# Patient Record
Sex: MALE | Race: WHITE | Employment: OTHER | URBAN - METROPOLITAN AREA
[De-identification: names, ages, dates, MRNs, and addresses within clinical notes are randomized per-mention and may not be internally consistent; named-entity substitution may affect disease eponyms.]

---

## 2019-05-02 ENCOUNTER — HOSPITAL ENCOUNTER (EMERGENCY)
Age: 74
Discharge: HOME | End: 2019-05-02
Attending: EMERGENCY MEDICINE | Admitting: EMERGENCY MEDICINE
Payer: MEDICARE

## 2019-05-02 VITALS
DIASTOLIC BLOOD PRESSURE: 70 MMHG | OXYGEN SATURATION: 98 % | RESPIRATION RATE: 18 BRPM | SYSTOLIC BLOOD PRESSURE: 137 MMHG | TEMPERATURE: 98 F | HEART RATE: 65 BPM

## 2019-05-02 DIAGNOSIS — R11.2 NON-INTRACTABLE VOMITING WITH NAUSEA, UNSPECIFIED VOMITING TYPE: Primary | ICD-10-CM

## 2019-05-02 LAB
ALBUMIN SERPL-MCNC: 3.8 G/DL (ref 3.4–5)
ALBUMIN/GLOB SERPL: 1.2 {RATIO} (ref 0.8–1.7)
ALP SERPL-CCNC: 76 U/L (ref 45–117)
ALT SERPL-CCNC: 27 U/L (ref 16–61)
ANION GAP BLD CALC-SCNC: 17 MMOL/L (ref 10–20)
ANION GAP SERPL CALC-SCNC: 3 MMOL/L (ref 3–18)
APPEARANCE UR: CLEAR
AST SERPL-CCNC: 29 U/L (ref 15–37)
BACTERIA URNS QL MICRO: ABNORMAL /HPF
BASOPHILS # BLD: 0 K/UL (ref 0–0.1)
BASOPHILS NFR BLD: 0 % (ref 0–2)
BILIRUB SERPL-MCNC: 1.1 MG/DL (ref 0.2–1)
BILIRUB UR QL: NEGATIVE
BUN BLD-MCNC: 17 MG/DL (ref 7–18)
BUN SERPL-MCNC: 16 MG/DL (ref 7–18)
BUN/CREAT SERPL: 12 (ref 12–20)
CA-I BLD-MCNC: 1.09 MMOL/L (ref 1.12–1.32)
CALCIUM SERPL-MCNC: 8.9 MG/DL (ref 8.5–10.1)
CHLORIDE BLD-SCNC: 107 MMOL/L (ref 100–108)
CHLORIDE SERPL-SCNC: 110 MMOL/L (ref 100–108)
CO2 BLD-SCNC: 25 MMOL/L (ref 19–24)
CO2 SERPL-SCNC: 28 MMOL/L (ref 21–32)
COLOR UR: YELLOW
CREAT SERPL-MCNC: 1.34 MG/DL (ref 0.6–1.3)
CREAT UR-MCNC: 1.2 MG/DL (ref 0.6–1.3)
DIFFERENTIAL METHOD BLD: ABNORMAL
EOSINOPHIL # BLD: 0 K/UL (ref 0–0.4)
EOSINOPHIL NFR BLD: 0 % (ref 0–5)
EPITH CASTS URNS QL MICRO: ABNORMAL /LPF (ref 0–5)
ERYTHROCYTE [DISTWIDTH] IN BLOOD BY AUTOMATED COUNT: 13.1 % (ref 11.6–14.5)
GLOBULIN SER CALC-MCNC: 3.1 G/DL (ref 2–4)
GLUCOSE BLD STRIP.AUTO-MCNC: 145 MG/DL (ref 74–106)
GLUCOSE SERPL-MCNC: 176 MG/DL (ref 74–99)
GLUCOSE UR STRIP.AUTO-MCNC: NEGATIVE MG/DL
HCT VFR BLD AUTO: 39.4 % (ref 36–48)
HCT VFR BLD CALC: 35 % (ref 36–49)
HGB BLD-MCNC: 11.9 G/DL (ref 12–16)
HGB BLD-MCNC: 13.3 G/DL (ref 13–16)
HGB UR QL STRIP: NEGATIVE
KETONES UR QL STRIP.AUTO: NEGATIVE MG/DL
LEUKOCYTE ESTERASE UR QL STRIP.AUTO: NEGATIVE
LYMPHOCYTES # BLD: 0.6 K/UL (ref 0.9–3.6)
LYMPHOCYTES NFR BLD: 6 % (ref 21–52)
MCH RBC QN AUTO: 29.8 PG (ref 24–34)
MCHC RBC AUTO-ENTMCNC: 33.8 G/DL (ref 31–37)
MCV RBC AUTO: 88.3 FL (ref 74–97)
MONOCYTES # BLD: 0.5 K/UL (ref 0.05–1.2)
MONOCYTES NFR BLD: 5 % (ref 3–10)
MUCOUS THREADS URNS QL MICRO: ABNORMAL /LPF
NEUTS SEG # BLD: 8.9 K/UL (ref 1.8–8)
NEUTS SEG NFR BLD: 89 % (ref 40–73)
NITRITE UR QL STRIP.AUTO: NEGATIVE
PH UR STRIP: 5.5 [PH] (ref 5–8)
PLATELET # BLD AUTO: 114 K/UL (ref 135–420)
PMV BLD AUTO: 10.7 FL (ref 9.2–11.8)
POTASSIUM BLD-SCNC: 4.7 MMOL/L (ref 3.5–5.5)
POTASSIUM SERPL-SCNC: 4.6 MMOL/L (ref 3.5–5.5)
PROT SERPL-MCNC: 6.9 G/DL (ref 6.4–8.2)
PROT UR STRIP-MCNC: ABNORMAL MG/DL
RBC # BLD AUTO: 4.46 M/UL (ref 4.7–5.5)
RBC #/AREA URNS HPF: ABNORMAL /HPF (ref 0–5)
SODIUM BLD-SCNC: 143 MMOL/L (ref 136–145)
SODIUM SERPL-SCNC: 141 MMOL/L (ref 136–145)
SP GR UR REFRACTOMETRY: 1.02 (ref 1–1.03)
UROBILINOGEN UR QL STRIP.AUTO: 1 EU/DL (ref 0.2–1)
WBC # BLD AUTO: 9.9 K/UL (ref 4.6–13.2)
WBC URNS QL MICRO: ABNORMAL /HPF (ref 0–4)

## 2019-05-02 PROCEDURE — 85025 COMPLETE CBC W/AUTO DIFF WBC: CPT

## 2019-05-02 PROCEDURE — 74011250636 HC RX REV CODE- 250/636: Performed by: EMERGENCY MEDICINE

## 2019-05-02 PROCEDURE — 81001 URINALYSIS AUTO W/SCOPE: CPT

## 2019-05-02 PROCEDURE — 96361 HYDRATE IV INFUSION ADD-ON: CPT

## 2019-05-02 PROCEDURE — 96374 THER/PROPH/DIAG INJ IV PUSH: CPT

## 2019-05-02 PROCEDURE — 80047 BASIC METABLC PNL IONIZED CA: CPT

## 2019-05-02 PROCEDURE — 80053 COMPREHEN METABOLIC PANEL: CPT

## 2019-05-02 PROCEDURE — 99284 EMERGENCY DEPT VISIT MOD MDM: CPT

## 2019-05-02 RX ORDER — ONDANSETRON 2 MG/ML
4 INJECTION INTRAMUSCULAR; INTRAVENOUS
Status: COMPLETED | OUTPATIENT
Start: 2019-05-02 | End: 2019-05-02

## 2019-05-02 RX ORDER — ONDANSETRON 4 MG/1
TABLET, ORALLY DISINTEGRATING ORAL
Qty: 10 TAB | Refills: 0 | Status: SHIPPED | OUTPATIENT
Start: 2019-05-02 | End: 2020-05-15

## 2019-05-02 RX ADMIN — SODIUM CHLORIDE 1000 ML: 900 INJECTION, SOLUTION INTRAVENOUS at 15:03

## 2019-05-02 RX ADMIN — ONDANSETRON 4 MG: 2 INJECTION INTRAMUSCULAR; INTRAVENOUS at 15:03

## 2019-05-02 NOTE — DISCHARGE INSTRUCTIONS
Patient Education        Nausea and Vomiting: Care Instructions  Your Care Instructions    When you are nauseated, you may feel weak and sweaty and notice a lot of saliva in your mouth. Nausea often leads to vomiting. Most of the time you do not need to worry about nausea and vomiting, but they can be signs of other illnesses. Two common causes of nausea and vomiting are stomach flu and food poisoning. Nausea and vomiting from viral stomach flu will usually start to improve within 24 hours. Nausea and vomiting from food poisoning may last from 12 to 48 hours. The doctor has checked you carefully, but problems can develop later. If you notice any problems or new symptoms, get medical treatment right away. Follow-up care is a key part of your treatment and safety. Be sure to make and go to all appointments, and call your doctor if you are having problems. It's also a good idea to know your test results and keep a list of the medicines you take. How can you care for yourself at home? · To prevent dehydration, drink plenty of fluids, enough so that your urine is light yellow or clear like water. Choose water and other caffeine-free clear liquids until you feel better. If you have kidney, heart, or liver disease and have to limit fluids, talk with your doctor before you increase the amount of fluids you drink. · Rest in bed until you feel better. · When you are able to eat, try clear soups, mild foods, and liquids until all symptoms are gone for 12 to 48 hours. Other good choices include dry toast, crackers, cooked cereal, and gelatin dessert, such as Jell-O. When should you call for help? Call 911 anytime you think you may need emergency care. For example, call if:    · You passed out (lost consciousness).    Call your doctor now or seek immediate medical care if:    · You have symptoms of dehydration, such as:  ? Dry eyes and a dry mouth. ? Passing only a little dark urine. ?  Feeling thirstier than usual.   · You have new or worsening belly pain.     · You have a new or higher fever.     · You vomit blood or what looks like coffee grounds.    Watch closely for changes in your health, and be sure to contact your doctor if:    · You have ongoing nausea and vomiting.     · Your vomiting is getting worse.     · Your vomiting lasts longer than 2 days.     · You are not getting better as expected. Where can you learn more? Go to http://blaine-boom.info/. Enter 25 [id number] in the search box to learn more about \"Nausea and Vomiting: Care Instructions. \"  Current as of: September 23, 2018  Content Version: 11.9  © 5406-5113 [employer]. Care instructions adapted under license by [employer] (which disclaims liability or warranty for this information). If you have questions about a medical condition or this instruction, always ask your healthcare professional. [address] any warranty or liability for your use of this information.

## 2019-05-02 NOTE — ED PROVIDER NOTES
EMERGENCY DEPARTMENT HISTORY AND PHYSICAL EXAM 
 
3:03 PM 
 
 
Date: 5/2/2019 Patient Name: Carolyne De Dios History of Presenting Illness Chief Complaint Patient presents with  Abdominal Pain History Provided By: patient Additional History (Context): Carolyne De Dios is a 66 y.o. male presents with vomiting which is been going on for an hour since arriving, earlier in today he is been very unsteady on his feet and copious diarrhea. He was not feeling well yesterday but nothing specific. He does not have abdominal pain right now. Vomit is nonbloody nonbilious and diarrhea is nonbloody. Surgical history significant for 7 way bypass. No abdominal surgical history. PCP: Rod Ramirez MD 
 
Chief Complaint:  
Duration:   
Timing: Location:  
Quality:  
Severity:  
Modifying Factors:  
Associated Symptoms:  
 
 
Current Outpatient Medications Medication Sig Dispense Refill  ondansetron (ZOFRAN ODT) 4 mg disintegrating tablet Take 1-2 tablets every 6-8 hours as needed for nausea and vomiting. 10 Tab 0  
 oxyCODONE-acetaminophen (PERCOCET) 5-325 mg per tablet Take 1 to 2 tablet every 6 hours as needed for pain control. If you were instructed to try over the counter ibuprofen or tylenol, only take the percocet for pain not controlled with the over the counter medication. 30 Tab 0  
 oxyCODONE-acetaminophen (PERCOCET) 5-325 mg per tablet Take 1 tablet every 4-6 hours as needed for pain control. If you were instructed to try over the counter ibuprofen or tylenol, only take the percocet for pain not controlled with the over the counter medication. 30 Tab 0  
 metFORMIN (GLUCOPHAGE) 500 mg tablet Take  by mouth two (2) times daily (with meals).  timolol maleate 0.5 % DrpD ophthalmic solution Administer 1 Drop to both eyes daily.  ezetimibe-simvastatin (VYTORIN 10/40) 10-40 mg per tablet Take 1 Tab by mouth nightly.  metoprolol (LOPRESSOR) 100 mg tablet Take 100 mg by mouth two (2) times a day.  ramipril (ALTACE) 5 mg capsule Take 5 mg by mouth daily.  sitaGLIPtin (JANUVIA) 100 mg tablet Take 100 mg by mouth daily.  bimatoprost (LUMIGAN) 0.03 % ophthalmic drops Administer 1 Drop to both eyes every evening.  aspirin 81 mg tablet Take 81 mg by mouth.  FIBER CHOICE PO Take  by mouth.  MULTIVITAMIN W/IRON, MINERALS (MULTIVITAMIN AND MINERALS PO) Take  by mouth. Lebron Guillaume, Indications: Move Free joint care  nitroglycerin (NITROSTAT) 0.4 mg SL tablet 0.4 mg by SubLINGual route every five (5) minutes as needed. Past History Past Medical History: 
Past Medical History:  
Diagnosis Date  CAD (coronary artery disease)  Diabetes ([facility] [address]  Glaucoma  High cholesterol  Hypertension Past Surgical History: 
Past Surgical History:  
Procedure Laterality Date  CARDIAC SURG PROCEDURE UNLIST    
 bypass Family History: 
Family History Problem Relation Age of Onset  Diabetes Other Social History: 
Social History Tobacco Use  Smoking status: Never Smoker  Smokeless tobacco: Never Used Substance Use Topics  Alcohol use: No  
 Drug use: No  
 
 
Allergies: 
No Known Allergies Review of Systems Review of Systems Constitutional: Negative for diaphoresis and fever. HENT: Negative for congestion and sore throat. Eyes: Negative for pain and itching. Respiratory: Negative for cough and shortness of breath. Cardiovascular: Negative for chest pain and palpitations. Gastrointestinal: Positive for diarrhea and vomiting. Negative for abdominal pain. Endocrine: Negative for polydipsia and polyuria. Genitourinary: Negative for dysuria and hematuria. Musculoskeletal: Negative for arthralgias and myalgias. Skin: Negative for rash and wound. Neurological: Positive for tremors and weakness. Negative for seizures and syncope. Hematological: Does not bruise/bleed easily. Psychiatric/Behavioral: Negative for agitation and hallucinations. Physical Exam  
 
 
Patient Vitals for the past 12 hrs: 
 Pulse Resp BP SpO2  
05/02/19 1400 63 15 139/64 96 % Physical Exam  
Constitutional: He appears well-developed and well-nourished. He appears distressed. HENT:  
Head: Normocephalic and atraumatic. Eyes: Conjunctivae are normal. No scleral icterus. Neck: Normal range of motion. Neck supple. No JVD present. Cardiovascular: Normal rate and regular rhythm. Murmur (Blowing systolic murmur) heard. 4 intact extremity pulses Pulmonary/Chest: Effort normal and breath sounds normal.  
Abdominal: Soft. He exhibits no mass. There is no tenderness. Actively vomiting. Musculoskeletal: Normal range of motion. Lymphadenopathy:  
  He has no cervical adenopathy. Neurological: He is alert. Skin: Skin is warm and dry. Nursing note and vitals reviewed. Diagnostic Study Results Labs - Recent Results (from the past 12 hour(s)) CBC WITH AUTOMATED DIFF Collection Time: 05/02/19  2:00 PM  
Result Value Ref Range WBC 9.9 4.6 - 13.2 K/uL  
 RBC 4.46 (L) 4.70 - 5.50 M/uL  
 HGB 13.3 13.0 - 16.0 g/dL HCT 39.4 36.0 - 48.0 % MCV 88.3 74.0 - 97.0 FL  
 MCH 29.8 24.0 - 34.0 PG  
 MCHC 33.8 31.0 - 37.0 g/dL  
 RDW 13.1 11.6 - 14.5 % PLATELET 132 (L) 215 - 420 K/uL MPV 10.7 9.2 - 11.8 FL  
 NEUTROPHILS 89 (H) 40 - 73 % LYMPHOCYTES 6 (L) 21 - 52 % MONOCYTES 5 3 - 10 % EOSINOPHILS 0 0 - 5 % BASOPHILS 0 0 - 2 %  
 ABS. NEUTROPHILS 8.9 (H) 1.8 - 8.0 K/UL  
 ABS. LYMPHOCYTES 0.6 (L) 0.9 - 3.6 K/UL  
 ABS. MONOCYTES 0.5 0.05 - 1.2 K/UL  
 ABS. EOSINOPHILS 0.0 0.0 - 0.4 K/UL  
 ABS. BASOPHILS 0.0 0.0 - 0.1 K/UL  
 DF AUTOMATED METABOLIC PANEL, COMPREHENSIVE  Collection Time: 05/02/19  2:00 PM  
 Result Value Ref Range Sodium PENDING mmol/L Potassium PENDING mmol/L Chloride PENDING mmol/L  
 CO2 28 21 - 32 mmol/L Anion gap PENDING mmol/L Glucose 176 (H) 74 - 99 mg/dL BUN 16 7.0 - 18 MG/DL Creatinine 1.34 (H) 0.6 - 1.3 MG/DL  
 BUN/Creatinine ratio 12 12 - 20 GFR est AA >60 >60 ml/min/1.73m2 GFR est non-AA 52 (L) >60 ml/min/1.73m2 Calcium 8.9 8.5 - 10.1 MG/DL Bilirubin, total 1.1 (H) 0.2 - 1.0 MG/DL  
 ALT (SGPT) 27 16 - 61 U/L  
 AST (SGOT) 29 15 - 37 U/L Alk. phosphatase 76 45 - 117 U/L Protein, total 6.9 6.4 - 8.2 g/dL Albumin 3.8 3.4 - 5.0 g/dL Globulin 3.1 2.0 - 4.0 g/dL A-G Ratio 1.2 0.8 - 1.7 URINALYSIS W/ RFLX MICROSCOPIC Collection Time: 05/02/19  4:00 PM  
Result Value Ref Range Color YELLOW Appearance CLEAR Specific gravity 1.021 1.005 - 1.030    
 pH (UA) 5.5 5.0 - 8.0 Protein TRACE (A) NEG mg/dL Glucose NEGATIVE  NEG mg/dL Ketone NEGATIVE  NEG mg/dL Bilirubin NEGATIVE  NEG Blood NEGATIVE  NEG Urobilinogen 1.0 0.2 - 1.0 EU/dL Nitrites NEGATIVE  NEG Leukocyte Esterase NEGATIVE  NEG    
URINE MICROSCOPIC ONLY Collection Time: 05/02/19  4:00 PM  
Result Value Ref Range WBC 0 to 3 0 - 4 /hpf  
 RBC NONE 0 - 5 /hpf Epithelial cells FEW 0 - 5 /lpf Bacteria FEW (A) NEG /hpf Mucus FEW (A) NEG /lpf POC CHEM8 Collection Time: 05/02/19  5:25 PM  
Result Value Ref Range CO2, POC 25 (H) 19 - 24 MMOL/L Glucose,  (H) 74 - 106 MG/DL  
 BUN, POC 17 7 - 18 MG/DL Creatinine, POC 1.2 0.6 - 1.3 MG/DL  
 GFRAA, POC >60 >60 ml/min/1.73m2 GFRNA, POC 59 (L) >60 ml/min/1.73m2 Sodium,  136 - 145 MMOL/L Potassium, POC 4.7 3.5 - 5.5 MMOL/L Calcium, ionized (POC) 1.09 (L) 1.12 - 1.32 mmol/L Chloride,  100 - 108 MMOL/L Anion gap, POC 17 10 - 20 Hematocrit, POC 35 (L) 36 - 49 % Hemoglobin, POC 11.9 (L) 12 - 16 G/DL Radiologic Studies -  
 No orders to display No results found. Medications ordered:  
Medications  
sodium chloride 0.9 % bolus infusion 1,000 mL (1,000 mL IntraVENous New Bag 5/2/19 1503) ondansetron (ZOFRAN) injection 4 mg (4 mg IntraVENous Given 5/2/19 1503) Medical Decision Making Initial Medical Decision Making and DDx: 
Possibly acute gastroenteritis. He is pain-free even while he is vomiting here in the ER. His belly is nondistended. Doubt bowel obstruction bowel perforation cholecystitis appendicitis diverticulitis. Will evaluate for metabolic abnormality, UTI as cause of weakness and tremors. ED Course: Progress Notes, Reevaluation, and Consults: 
  
5:36 PM patient symptoms abruptly resolved after his long bout of vomiting. He was ambulatory to the bathroom and is had no events since. Due to lab equipment problems were unable to get the CMP back. Patient has no symptoms and no pain, point-of-care Clementine 8 is nonactionable. Discharge with prescription for Zofran, return for worsening or concerning symptoms. I am the first provider for this patient. I reviewed the vital signs, available nursing notes, past medical history, past surgical history, family history and social history. Patient Vitals for the past 12 hrs: 
 Pulse Resp BP SpO2  
05/02/19 1400 63 15 139/64 96 % Vital Signs-Reviewed the patient's vital signs. Pulse Oximetry Analysis, Cardiac Monitor, 12 lead ekg: 
Sinus rhythm at 63, pulse ox 96 room air Interpreted by the EP. Records Reviewed: Nursing notes reviewed (Time of Review: 3:03 PM) Procedures:  
Critical Care Time:  
Aspirin: (was aspirin given for stroke?) Diagnosis Clinical Impression: 1. Non-intractable vomiting with nausea, unspecified vomiting type Disposition: Discharged Follow-up Information Follow up With Specialties Details Why Contact Info Lisa Jordan MD Internal Medicine In 2 days  [address] [facility] [address] 
[phone] Patient's Medications Start Taking ONDANSETRON (ZOFRAN ODT) 4 MG DISINTEGRATING TABLET    Take 1-2 tablets every 6-8 hours as needed for nausea and vomiting. Continue Taking ASPIRIN 81 MG TABLET    Take 81 mg by mouth. BIMATOPROST (LUMIGAN) 0.03 % OPHTHALMIC DROPS    Administer 1 Drop to both eyes every evening. EZETIMIBE-SIMVASTATIN (VYTORIN 10/40) 10-40 MG PER TABLET    Take 1 Tab by mouth nightly. FIBER CHOICE PO    Take  by mouth. METFORMIN (GLUCOPHAGE) 500 MG TABLET    Take  by mouth two (2) times daily (with meals). METOPROLOL (LOPRESSOR) 100 MG TABLET    Take 100 mg by mouth two (2) times a day. MULTIVITAMIN W/IRON, MINERALS (MULTIVITAMIN AND MINERALS PO)    Take  by mouth. NITROGLYCERIN (NITROSTAT) 0.4 MG SL TABLET    0.4 mg by SubLINGual route every five (5) minutes as needed. OTHER,NON-FORMULARY,    Indications: Move Free joint care OXYCODONE-ACETAMINOPHEN (PERCOCET) 5-325 MG PER TABLET    Take 1 to 2 tablet every 6 hours as needed for pain control. If you were instructed to try over the counter ibuprofen or tylenol, only take the percocet for pain not controlled with the over the counter medication. OXYCODONE-ACETAMINOPHEN (PERCOCET) 5-325 MG PER TABLET    Take 1 tablet every 4-6 hours as needed for pain control. If you were instructed to try over the counter ibuprofen or tylenol, only take the percocet for pain not controlled with the over the counter medication. RAMIPRIL (ALTACE) 5 MG CAPSULE    Take 5 mg by mouth daily. SITAGLIPTIN (JANUVIA) 100 MG TABLET    Take 100 mg by mouth daily. TIMOLOL MALEATE 0.5 % DRPD OPHTHALMIC SOLUTION    Administer 1 Drop to both eyes daily. These Medications have changed No medications on file Stop Taking No medications on file  
 
_______________________________ Notes:   
Garima Loera MD using Dragon dictation     
_______________________________

## 2020-05-15 ENCOUNTER — HOSPITAL ENCOUNTER (EMERGENCY)
Age: 75
Discharge: HOME | End: 2020-05-15
Attending: EMERGENCY MEDICINE
Payer: MEDICARE

## 2020-05-15 VITALS
SYSTOLIC BLOOD PRESSURE: 172 MMHG | TEMPERATURE: 98.1 F | RESPIRATION RATE: 16 BRPM | OXYGEN SATURATION: 99 % | HEART RATE: 54 BPM | DIASTOLIC BLOOD PRESSURE: 79 MMHG

## 2020-05-15 DIAGNOSIS — R04.0 NOSEBLEED: Primary | ICD-10-CM

## 2020-05-15 PROCEDURE — 99283 EMERGENCY DEPT VISIT LOW MDM: CPT

## 2020-05-15 RX ORDER — NITROGLYCERIN 0.4 MG/1
0.4 TABLET SUBLINGUAL
COMMUNITY
End: 2020-05-15

## 2020-05-15 RX ORDER — EZETIMIBE AND SIMVASTATIN 10; 40 MG/1; MG/1
1 TABLET ORAL DAILY
COMMUNITY
End: 2020-05-15

## 2020-05-15 RX ORDER — CLOPIDOGREL BISULFATE 75 MG/1
75 TABLET ORAL DAILY
COMMUNITY
Start: 2020-05-08

## 2020-05-15 RX ORDER — ASPIRIN 81 MG/1
81 TABLET ORAL DAILY
COMMUNITY

## 2020-05-15 RX ORDER — METOPROLOL SUCCINATE 100 MG/1
TABLET, EXTENDED RELEASE ORAL
COMMUNITY
Start: 2020-04-08 | End: 2020-05-15

## 2020-05-15 RX ORDER — GLIMEPIRIDE 4 MG/1
4 TABLET ORAL DAILY
COMMUNITY
End: 2020-10-13

## 2020-05-15 RX ORDER — METFORMIN HYDROCHLORIDE 500 MG/1
500 TABLET ORAL 2 TIMES DAILY WITH MEALS
COMMUNITY
End: 2020-10-13

## 2020-05-15 RX ORDER — GLIMEPIRIDE 4 MG/1
TABLET ORAL
COMMUNITY
Start: 2020-04-08 | End: 2020-05-15

## 2020-05-15 RX ORDER — METOPROLOL SUCCINATE 50 MG/1
100 TABLET, EXTENDED RELEASE ORAL DAILY
COMMUNITY
End: 2020-10-13

## 2020-05-15 RX ORDER — BLOOD SUGAR DIAGNOSTIC
STRIP MISCELLANEOUS
COMMUNITY
Start: 2020-04-08

## 2020-05-15 RX ORDER — LANCETS
EACH MISCELLANEOUS
COMMUNITY
Start: 2019-06-18

## 2020-05-15 RX ORDER — DONEPEZIL HYDROCHLORIDE 10 MG/1
10 TABLET, FILM COATED ORAL
COMMUNITY
Start: 2020-04-08

## 2020-05-15 NOTE — DISCHARGE INSTRUCTIONS
Patient Education        Nosebleeds: Care Instructions  Your Care Instructions    Nosebleeds are common, especially if you have colds or allergies. Many things can cause a nosebleed. Some nosebleeds stop on their own with pressure. Others need packing. Some get cauterized (sealed). If you have gauze or other packing materials in your nose, you will need to follow up with your doctor to have the packing removed. You may need more treatment if you get nosebleeds a lot. The doctor has checked you carefully, but problems can develop later. If you notice any problems or new symptoms, get medical treatment right away. Follow-up care is a key part of your treatment and safety. Be sure to make and go to all appointments, and call your doctor if you are having problems. It's also a good idea to know your test results and keep a list of the medicines you take. How can you care for yourself at home? · If you get another nosebleed:  ? Sit up and tilt your head slightly forward. This keeps blood from going down your throat. ? Use your thumb and index finger to pinch your nose shut for 10 minutes. Use a clock. Do not check to see if the bleeding has stopped before the 10 minutes are up. If the bleeding has not stopped, pinch your nose shut for another 10 minutes. ? When the bleeding has stopped, try not to pick, rub, or blow your nose for 12 hours. Avoiding these things helps keep your nose from bleeding again. · If your doctor prescribed antibiotics, take them as directed. Do not stop taking them just because you feel better. You need to take the full course of antibiotics. To prevent nosebleeds  · Do not blow your nose too hard. · Try not to lift or strain after a nosebleed. · Raise your head on a pillow while you sleep. · Put a thin layer of a saline- or water-based nasal gel, such as NasoGel, inside your nose. Put it on the septum, which divides your nostrils.  This will prevent dryness that can cause nosebleeds. · Use a vaporizer or humidifier to add moisture to your bedroom. Follow the directions for cleaning the machine. · Do not use aspirin, ibuprofen (Advil, Motrin), or naproxen (Aleve) for 36 to 48 hours after a nosebleed unless your doctor tells you to. You can use acetaminophen (Tylenol) for pain relief. · Talk to your doctor about stopping any other medicines you are taking. Some medicines may make you more likely to get a nosebleed. · Do not use cold medicines or nasal sprays without first talking to your doctor. They can make your nose dry. When should you call for help? Call 911 anytime you think you may need emergency care. For example, call if:    · You passed out (lost consciousness).    Call your doctor now or seek immediate medical care if:    · You get another nosebleed and your nose is still bleeding after you have applied pressure 3 times for 10 minutes each time (30 minutes total).     · There is a lot of blood running down the back of your throat even after you pinch your nose and tilt your head forward.     · You have a fever.     · You have sinus pain.    Watch closely for changes in your health, and be sure to contact your doctor if:    · You get nosebleeds often, even if they stop.     · You do not get better as expected. Where can you learn more? Go to http://blaine-boom.info/  Enter S156 in the search box to learn more about \"Nosebleeds: Care Instructions. \"  Current as of: June 26, 2019Content Version: 12.4  © 1516-7755 [employer]. Care instructions adapted under license by [employer] (which disclaims liability or warranty for this information). If you have questions about a medical condition or this instruction, always ask your healthcare professional. [address] any warranty or liability for your use of this information.

## 2020-05-15 NOTE — ED TRIAGE NOTES
Patient c/o nose bleed \"trickle\" x 2 yesterday around 8:30pm that spontaneously stopped on its own. He states he coughed a few times and it was mixed with spit and a little bit of blood. He could not recall if he coughed before the nose bleed, or afterwards. He could not recall his list of medications, but only that he took a lot of them. He states he is having heart valve surgery next week but neither he nor his spouse could recall the name of his Cardiologist. No active bleeding noted at this time.  Patient noted to be on Aspirin and Plavix, per his medication history list.

## 2020-05-15 NOTE — ED NOTES
Both written and verbal discharge instructions given to pt with verbalized understanding of home care and follow up. No prescription given.

## 2020-05-15 NOTE — ED PROVIDER NOTES
65 yo CM with PMHx cad, htn on plavix presents with nosebleed last night, now resolved. Pt states he had a nosebleed around 2100 last night and noticed that since then, he has been coughing up some bloody sputum, though the nosebleeding has resolved. Pt denies fevers, no chest pain, no shortness of breath or other symptoms.            Past Medical History:   Diagnosis Date    CAD (coronary artery disease)     Aortic Stenosis    Diabetes ([address]     Glaucoma     High cholesterol     Hypertension     Ill-defined condition     Bilateral Carotid Artery Stenosis       Past Surgical History:   Procedure Laterality Date    CARDIAC SURG PROCEDURE UNLIST      bypass         Family History:   Problem Relation Age of Onset    Diabetes Other        Social History     Socioeconomic History    Marital status:      Spouse name: Not on file    Number of children: Not on file    Years of education: Not on file    Highest education level: Not on file   Occupational History    Not on file   Social Needs    Financial resource strain: Not on file    Food insecurity     Worry: Not on file     Inability: Not on file    Transportation needs     Medical: Not on file     Non-medical: Not on file   Tobacco Use    Smoking status: Never Smoker    Smokeless tobacco: Never Used   Substance and Sexual Activity    Alcohol use: No    Drug use: No    Sexual activity: Not on file   Lifestyle    Physical activity     Days per week: Not on file     Minutes per session: Not on file    Stress: Not on file   Relationships    Social connections     Talks on phone: Not on file     Gets together: Not on file     Attends Islam service: Not on file     Active member of club or organization: Not on file     Attends meetings of clubs or organizations: Not on file     Relationship status: Not on file    Intimate partner violence     Fear of current or ex partner: Not on file     Emotionally abused: Not on file     Physically abused: Not on file     Forced sexual activity: Not on file   Other Topics Concern    Not on file   Social History Narrative    Not on file         ALLERGIES: Patient has no known allergies. Review of Systems   Constitutional: Negative for fever. HENT: Positive for nosebleeds. Negative for trouble swallowing. Respiratory: Positive for cough. Negative for shortness of breath. Cardiovascular: Negative for chest pain. Gastrointestinal: Negative for abdominal pain and vomiting. Genitourinary: Negative for difficulty urinating. Musculoskeletal: Negative for back pain. Skin: Negative for wound. Neurological: Negative for syncope. Psychiatric/Behavioral: Negative for behavioral problems. All other systems reviewed and are negative. Vitals:    05/15/20 0610   BP: 172/79   Pulse: (!) 54   Resp: 16   Temp: 98.1 °F (36.7 °C)   SpO2: 99%            Physical Exam  Vitals signs and nursing note reviewed. Constitutional:       General: He is not in acute distress. Appearance: He is well-developed. Comments: well-appearing, nad   HENT:      Head: Normocephalic and atraumatic. Nose: Nose normal.      Comments: No bleeding currently     Mouth/Throat:      Pharynx: Oropharynx is clear. Neck:      Musculoskeletal: Normal range of motion. Cardiovascular:      Rate and Rhythm: Normal rate. Pulmonary:      Effort: Pulmonary effort is normal. No respiratory distress. Breath sounds: Normal breath sounds. Abdominal:      Palpations: Abdomen is soft. Tenderness: There is no abdominal tenderness. Musculoskeletal: Normal range of motion. Comments: Mechanically stable   Skin:     General: Skin is warm. Neurological:      General: No focal deficit present. Mental Status: He is alert.       Comments: No focal deficits noted   Psychiatric:         Behavior: Behavior normal.          MDM  Number of Diagnoses or Management Options  Diagnosis management comments: 65 yo CM with PMHx cad on plavix presents with nosebleed and coughing up blood. No fevers, no cp/sob. Examination unremarkable with no bleeding currently and OP clear, and pt ctab with no increased wob. Will monitor in ED and re-evaluate. Amount and/or Complexity of Data Reviewed  Obtain history from someone other than the patient: yes  Review and summarize past medical records: yes    Patient Progress  Patient progress: stable         Procedures    PROGRESS NOTES    6:18 AM:   Alexandrea Acosta MD arrives to the bedside to evaluate the patient. Answered the patient's questions regarding the treatment plan. CONSULTATIONS  None      MEDICATIONS ORDERED  Medications - No data to display    RADIOLOGY INTERPRETATIONS  No orders to display       EKG READINGS/LABORATORY RESULTS  No results found for this or any previous visit (from the past 12 hour(s)). ED DIAGNOSIS & DISPOSITION INFORMATION  Diagnosis:   1. Nosebleed        Disposition: Discharged    Follow-up Information     Follow up With Specialties Details Why Contact Info    Rosana Soto MD Internal Medicine Schedule an appointment as soon as possible for a visit  [address] [place] [address]      [id number] [place] [address] EMERGENCY DEPT Emergency Medicine  As needed [address] [place] [address]  [phone]          Patient's Medications   Start Taking    No medications on file   Continue Taking    ACCU-CHEK ROBERT PLUS TEST STRP STRIP        ASPIRIN DELAYED-RELEASE 81 MG TABLET    Take 81 mg by mouth daily. BIMATOPROST (LUMIGAN) 0.03 % OPHTHALMIC DROPS    Administer 1 Drop to both eyes every evening. CLOPIDOGREL (PLAVIX) 75 MG TAB    Take 75 mg by mouth daily. DONEPEZIL (ARICEPT) 10 MG TABLET        EZETIMIBE-SIMVASTATIN (VYTORIN 10/40) 10-40 MG PER TABLET    Take 1 Tab by mouth nightly. FIBER CHOICE PO    Take  by mouth. GLIMEPIRIDE (AMARYL) 4 MG TABLET    Take 4 mg by mouth daily.     GLUCOSE BLOOD VI TEST STRIPS (ACCU-CHEK ROBERT PLUS TEST STRP) STRIP        LANCETS (ACCU-CHEK SOFTCLIX LANCETS) MISC        METFORMIN (GLUCOPHAGE) 500 MG TABLET    Take 500 mg by mouth two (2) times daily (with meals). METOPROLOL (LOPRESSOR) 100 MG TABLET    Take 100 mg by mouth two (2) times a day. METOPROLOL SUCCINATE (TOPROL-XL) 50 MG XL TABLET    Take 100 mg by mouth daily. MULTIVITAMIN W/IRON, MINERALS (MULTIVITAMIN AND MINERALS PO)    Take  by mouth. NITROGLYCERIN (NITROSTAT) 0.4 MG SL TABLET    0.4 mg by SubLINGual route every five (5) minutes as needed. OTHER,NON-FORMULARY,    Indications: Move Free joint care    RAMIPRIL (ALTACE) 5 MG CAPSULE    Take 5 mg by mouth daily. SITAGLIPTIN (JANUVIA) 100 MG TABLET    Take 100 mg by mouth daily. TIMOLOL MALEATE 0.5 % DRPD OPHTHALMIC SOLUTION    Administer 1 Drop to both eyes daily. These Medications have changed    No medications on file   Stop Taking    ASPIRIN 81 MG TABLET    Take 81 mg by mouth. EZETIMIBE-SIMVASTATIN (VYTORIN) 10-40 MG PER TABLET    Take 1 Tab by mouth daily. GLIMEPIRIDE (AMARYL) 4 MG TABLET        METFORMIN (GLUCOPHAGE) 500 MG TABLET    Take  by mouth two (2) times daily (with meals). METOPROLOL SUCCINATE (TOPROL-XL) 100 MG TABLET        NITROGLYCERIN (NITROSTAT) 0.4 MG SL TABLET    0.4 mg by SubLINGual route. ONDANSETRON (ZOFRAN ODT) 4 MG DISINTEGRATING TABLET    Take 1-2 tablets every 6-8 hours as needed for nausea and vomiting. OXYCODONE-ACETAMINOPHEN (PERCOCET) 5-325 MG PER TABLET    Take 1 to 2 tablet every 6 hours as needed for pain control. If you were instructed to try over the counter ibuprofen or tylenol, only take the percocet for pain not controlled with the over the counter medication. OXYCODONE-ACETAMINOPHEN (PERCOCET) 5-325 MG PER TABLET    Take 1 tablet every 4-6 hours as needed for pain control.   If you were instructed to try over the counter ibuprofen or tylenol, only take the percocet for pain not controlled with the over the counter medication.            Ramila Ragland MD.

## 2020-10-11 ENCOUNTER — HOSPITAL ENCOUNTER (INPATIENT)
Age: 75
LOS: 1 days | Discharge: HOME HEALTH | DRG: 312 | End: 2020-10-13
Attending: EMERGENCY MEDICINE | Admitting: HOSPITALIST
Payer: MEDICARE

## 2020-10-11 ENCOUNTER — APPOINTMENT (OUTPATIENT)
Dept: CT IMAGING | Age: 75
DRG: 312 | End: 2020-10-11
Attending: NURSE PRACTITIONER
Payer: MEDICARE

## 2020-10-11 ENCOUNTER — APPOINTMENT (OUTPATIENT)
Dept: GENERAL RADIOLOGY | Age: 75
DRG: 312 | End: 2020-10-11
Attending: EMERGENCY MEDICINE
Payer: MEDICARE

## 2020-10-11 DIAGNOSIS — I65.23 BILATERAL CAROTID ARTERY STENOSIS: ICD-10-CM

## 2020-10-11 DIAGNOSIS — I25.10 CORONARY ARTERY DISEASE INVOLVING NATIVE HEART WITHOUT ANGINA PECTORIS, UNSPECIFIED VESSEL OR LESION TYPE: ICD-10-CM

## 2020-10-11 DIAGNOSIS — R55 SYNCOPE, UNSPECIFIED SYNCOPE TYPE: Primary | ICD-10-CM

## 2020-10-11 DIAGNOSIS — I95.1 ORTHOSTATIC HYPOTENSION: ICD-10-CM

## 2020-10-11 LAB
25(OH)D3 SERPL-MCNC: 13.4 NG/ML (ref 30–100)
ANION GAP SERPL CALC-SCNC: 8 MMOL/L (ref 3–18)
ATRIAL RATE: 63 BPM
BASOPHILS # BLD: 0 K/UL (ref 0–0.1)
BASOPHILS NFR BLD: 0 % (ref 0–2)
BNP SERPL-MCNC: 586 PG/ML (ref 0–1800)
BUN SERPL-MCNC: 18 MG/DL (ref 7–18)
BUN/CREAT SERPL: 11 (ref 12–20)
CALCIUM SERPL-MCNC: 8.8 MG/DL (ref 8.5–10.1)
CALCULATED P AXIS, ECG09: 40 DEGREES
CALCULATED R AXIS, ECG10: -27 DEGREES
CALCULATED T AXIS, ECG11: 21 DEGREES
CHLORIDE SERPL-SCNC: 105 MMOL/L (ref 100–111)
CK MB CFR SERPL CALC: 2.1 % (ref 0–4)
CK MB SERPL-MCNC: 1.8 NG/ML (ref 5–25)
CK SERPL-CCNC: 86 U/L (ref 39–308)
CO2 SERPL-SCNC: 26 MMOL/L (ref 21–32)
CREAT SERPL-MCNC: 1.7 MG/DL (ref 0.6–1.3)
DIAGNOSIS, 93000: NORMAL
DIFFERENTIAL METHOD BLD: ABNORMAL
EOSINOPHIL # BLD: 0 K/UL (ref 0–0.4)
EOSINOPHIL NFR BLD: 0 % (ref 0–5)
ERYTHROCYTE [DISTWIDTH] IN BLOOD BY AUTOMATED COUNT: 13.2 % (ref 11.6–14.5)
FOLATE SERPL-MCNC: 13.8 NG/ML (ref 3.1–17.5)
GLUCOSE BLD STRIP.AUTO-MCNC: 102 MG/DL (ref 70–110)
GLUCOSE BLD STRIP.AUTO-MCNC: 47 MG/DL (ref 70–110)
GLUCOSE BLD STRIP.AUTO-MCNC: 87 MG/DL (ref 70–110)
GLUCOSE SERPL-MCNC: 72 MG/DL (ref 74–99)
HCT VFR BLD AUTO: 37.3 % (ref 36–48)
HGB BLD-MCNC: 12.7 G/DL (ref 13–16)
LYMPHOCYTES # BLD: 0.5 K/UL (ref 0.9–3.6)
LYMPHOCYTES NFR BLD: 11 % (ref 21–52)
MCH RBC QN AUTO: 29.2 PG (ref 24–34)
MCHC RBC AUTO-ENTMCNC: 34 G/DL (ref 31–37)
MCV RBC AUTO: 85.7 FL (ref 74–97)
MONOCYTES # BLD: 0.5 K/UL (ref 0.05–1.2)
MONOCYTES NFR BLD: 10 % (ref 3–10)
NEUTS SEG # BLD: 3.7 K/UL (ref 1.8–8)
NEUTS SEG NFR BLD: 79 % (ref 40–73)
P-R INTERVAL, ECG05: 248 MS
PLATELET # BLD AUTO: 120 K/UL (ref 135–420)
PMV BLD AUTO: 10.8 FL (ref 9.2–11.8)
POTASSIUM SERPL-SCNC: 3.4 MMOL/L (ref 3.5–5.5)
Q-T INTERVAL, ECG07: 444 MS
QRS DURATION, ECG06: 88 MS
QTC CALCULATION (BEZET), ECG08: 454 MS
RBC # BLD AUTO: 4.35 M/UL (ref 4.7–5.5)
SODIUM SERPL-SCNC: 139 MMOL/L (ref 136–145)
TROPONIN I SERPL-MCNC: <0.02 NG/ML (ref 0–0.04)
VENTRICULAR RATE, ECG03: 63 BPM
VIT B12 SERPL-MCNC: 169 PG/ML (ref 211–911)
WBC # BLD AUTO: 4.8 K/UL (ref 4.6–13.2)

## 2020-10-11 PROCEDURE — 93005 ELECTROCARDIOGRAM TRACING: CPT

## 2020-10-11 PROCEDURE — 74011000258 HC RX REV CODE- 258: Performed by: HOSPITALIST

## 2020-10-11 PROCEDURE — 85025 COMPLETE CBC W/AUTO DIFF WBC: CPT

## 2020-10-11 PROCEDURE — 87086 URINE CULTURE/COLONY COUNT: CPT

## 2020-10-11 PROCEDURE — 81001 URINALYSIS AUTO W/SCOPE: CPT

## 2020-10-11 PROCEDURE — 71045 X-RAY EXAM CHEST 1 VIEW: CPT

## 2020-10-11 PROCEDURE — 74011250637 HC RX REV CODE- 250/637: Performed by: NURSE PRACTITIONER

## 2020-10-11 PROCEDURE — 82607 VITAMIN B-12: CPT

## 2020-10-11 PROCEDURE — 94762 N-INVAS EAR/PLS OXIMTRY CONT: CPT

## 2020-10-11 PROCEDURE — 99218 HC RM OBSERVATION: CPT

## 2020-10-11 PROCEDURE — 2709999900 HC NON-CHARGEABLE SUPPLY

## 2020-10-11 PROCEDURE — 99284 EMERGENCY DEPT VISIT MOD MDM: CPT

## 2020-10-11 PROCEDURE — 74011250636 HC RX REV CODE- 250/636: Performed by: HOSPITALIST

## 2020-10-11 PROCEDURE — 82306 VITAMIN D 25 HYDROXY: CPT

## 2020-10-11 PROCEDURE — 96372 THER/PROPH/DIAG INJ SC/IM: CPT

## 2020-10-11 PROCEDURE — 80048 BASIC METABOLIC PNL TOTAL CA: CPT

## 2020-10-11 PROCEDURE — 82550 ASSAY OF CK (CPK): CPT

## 2020-10-11 PROCEDURE — 83880 ASSAY OF NATRIURETIC PEPTIDE: CPT

## 2020-10-11 PROCEDURE — 82962 GLUCOSE BLOOD TEST: CPT

## 2020-10-11 PROCEDURE — 70450 CT HEAD/BRAIN W/O DYE: CPT

## 2020-10-11 RX ORDER — CLOPIDOGREL BISULFATE 75 MG/1
75 TABLET ORAL DAILY
Status: DISCONTINUED | OUTPATIENT
Start: 2020-10-12 | End: 2020-10-13 | Stop reason: HOSPADM

## 2020-10-11 RX ORDER — ATORVASTATIN CALCIUM 20 MG/1
20 TABLET, FILM COATED ORAL
Status: DISCONTINUED | OUTPATIENT
Start: 2020-10-11 | End: 2020-10-13 | Stop reason: HOSPADM

## 2020-10-11 RX ORDER — DEXTROSE MONOHYDRATE AND SODIUM CHLORIDE 5; .9 G/100ML; G/100ML
75 INJECTION, SOLUTION INTRAVENOUS CONTINUOUS
Status: DISCONTINUED | OUTPATIENT
Start: 2020-10-11 | End: 2020-10-13 | Stop reason: HOSPADM

## 2020-10-11 RX ORDER — ACETAMINOPHEN 325 MG/1
650 TABLET ORAL
Status: DISCONTINUED | OUTPATIENT
Start: 2020-10-11 | End: 2020-10-13 | Stop reason: HOSPADM

## 2020-10-11 RX ORDER — MAGNESIUM SULFATE 100 %
4 CRYSTALS MISCELLANEOUS AS NEEDED
Status: DISCONTINUED | OUTPATIENT
Start: 2020-10-11 | End: 2020-10-13 | Stop reason: HOSPADM

## 2020-10-11 RX ORDER — CYANOCOBALAMIN 1000 UG/ML
100 INJECTION, SOLUTION INTRAMUSCULAR; SUBCUTANEOUS DAILY
Status: DISCONTINUED | OUTPATIENT
Start: 2020-10-11 | End: 2020-10-13 | Stop reason: HOSPADM

## 2020-10-11 RX ORDER — CYANOCOBALAMIN 1000 UG/ML
100 INJECTION, SOLUTION INTRAMUSCULAR; SUBCUTANEOUS DAILY
Status: DISCONTINUED | OUTPATIENT
Start: 2020-10-12 | End: 2020-10-11

## 2020-10-11 RX ORDER — LANOLIN ALCOHOL/MO/W.PET/CERES
1000 CREAM (GRAM) TOPICAL DAILY
Status: DISCONTINUED | OUTPATIENT
Start: 2020-10-14 | End: 2020-10-13 | Stop reason: HOSPADM

## 2020-10-11 RX ORDER — ASPIRIN 81 MG/1
81 TABLET ORAL DAILY
Status: DISCONTINUED | OUTPATIENT
Start: 2020-10-12 | End: 2020-10-13 | Stop reason: HOSPADM

## 2020-10-11 RX ORDER — INSULIN LISPRO 100 [IU]/ML
INJECTION, SOLUTION INTRAVENOUS; SUBCUTANEOUS
Status: DISCONTINUED | OUTPATIENT
Start: 2020-10-11 | End: 2020-10-13 | Stop reason: HOSPADM

## 2020-10-11 RX ORDER — DEXTROSE 50 % IN WATER (D50W) INTRAVENOUS SYRINGE
25-50 AS NEEDED
Status: DISCONTINUED | OUTPATIENT
Start: 2020-10-11 | End: 2020-10-13 | Stop reason: HOSPADM

## 2020-10-11 RX ORDER — POTASSIUM CHLORIDE 20 MEQ/1
40 TABLET, EXTENDED RELEASE ORAL ONCE
Status: COMPLETED | OUTPATIENT
Start: 2020-10-11 | End: 2020-10-11

## 2020-10-11 RX ORDER — METOPROLOL TARTRATE 25 MG/1
12.5 TABLET, FILM COATED ORAL EVERY 12 HOURS
Status: DISCONTINUED | OUTPATIENT
Start: 2020-10-11 | End: 2020-10-13 | Stop reason: HOSPADM

## 2020-10-11 RX ORDER — PROMETHAZINE HYDROCHLORIDE 25 MG/1
12.5 TABLET ORAL
Status: DISCONTINUED | OUTPATIENT
Start: 2020-10-11 | End: 2020-10-13 | Stop reason: HOSPADM

## 2020-10-11 RX ORDER — DONEPEZIL HYDROCHLORIDE 5 MG/1
10 TABLET, FILM COATED ORAL DAILY
Status: DISCONTINUED | OUTPATIENT
Start: 2020-10-12 | End: 2020-10-13 | Stop reason: HOSPADM

## 2020-10-11 RX ADMIN — METOPROLOL TARTRATE 12.5 MG: 25 TABLET, FILM COATED ORAL at 21:34

## 2020-10-11 RX ADMIN — DEXTROSE MONOHYDRATE AND SODIUM CHLORIDE 75 ML/HR: 5; .9 INJECTION, SOLUTION INTRAVENOUS at 21:35

## 2020-10-11 RX ADMIN — ACETAMINOPHEN 650 MG: 325 TABLET ORAL at 23:49

## 2020-10-11 RX ADMIN — CYANOCOBALAMIN 100 MCG: 1000 INJECTION, SOLUTION INTRAMUSCULAR at 21:34

## 2020-10-11 RX ADMIN — ATORVASTATIN CALCIUM 20 MG: 20 TABLET, FILM COATED ORAL at 21:34

## 2020-10-11 RX ADMIN — POTASSIUM CHLORIDE 40 MEQ: 1500 TABLET, EXTENDED RELEASE ORAL at 18:48

## 2020-10-11 NOTE — H&P
Hospitalist Admission History and Physical    NAME:  Joselito Batista   :   1945   MRN:   [id number]     PCP:  Debra Ramos MD  Date/Time:  10/11/2020 2:46 PM    Subjective:   CHIEF COMPLAINT: Syncope    HISTORY OF PRESENT ILLNESS:     Adelaide Erickson is a 66 y.o.  male who presents with syncopal episode at home. Patient is a poor historian and defers most of his history to his wife. In conversation with wife patient was showering earlier today when he called out for her was able to walk about 10 feet or so per wife and by the time wife made it to their bedroom she found him collapsed on the bedroom floor. Wife states patient has not had any other falls in the past and has only complained of dizziness 1 time in the past.  Wife states that patient has been more active recently including working outside more. Wife is unaware of how his sugars have been recently as states that patient does not tell her. Wife helps manage his oral medications at home. Patient states to this provider he is feeling fine denies any current headaches, dizziness, chest pain, shortness of breath, nausea vomiting or constipation. Denies swelling of extremities or joint pains. Patient's only complaint is that he is thirsty.     Past Medical History:   Diagnosis Date    CAD (coronary artery disease)     Aortic Stenosis    Carotid artery stenosis     Diabetes (HCC)     Glaucoma     High cholesterol     Hypertension     Ill-defined condition     Bilateral Carotid Artery Stenosis        Past Surgical History:   Procedure Laterality Date    CARDIAC SURG PROCEDURE UNLIST      bypass       Social History     Tobacco Use    Smoking status: Never Smoker    Smokeless tobacco: Never Used   Substance Use Topics    Alcohol use: No        Family History   Problem Relation Age of Onset    Diabetes Other         No Known Allergies     Prior to Admission Medications   Prescriptions Last Dose Informant Patient Reported? Taking? Accu-Chek Tracee Plus test strp strip   Yes No   FIBER CHOICE PO   Yes No   Sig: Take  by mouth. MULTIVITAMIN W/IRON, MINERALS (MULTIVITAMIN AND MINERALS PO)   Yes No   Sig: Take  by mouth. OTHER,NON-FORMULARY,   Yes No   Sig: Indications: Move Free joint care   aspirin delayed-release 81 mg tablet   Yes No   Sig: Take 81 mg by mouth daily. bimatoprost (LUMIGAN) 0.03 % ophthalmic drops   Yes No   Sig: Administer 1 Drop to both eyes every evening. clopidogreL (PLAVIX) 75 mg tab   Yes No   Sig: Take 75 mg by mouth daily. donepeziL (ARICEPT) 10 mg tablet   Yes No   ezetimibe-simvastatin (VYTORIN 10/40) 10-40 mg per tablet   Yes No   Sig: Take 1 Tab by mouth nightly. glimepiride (AMARYL) 4 mg tablet   Yes No   Sig: Take 4 mg by mouth daily. glucose blood VI test strips (Accu-Chek Tracee Plus test strp) strip   Yes No   lancets (Accu-Chek Softclix Lancets) misc   Yes No   metFORMIN (GLUCOPHAGE) 500 mg tablet   Yes No   Sig: Take 500 mg by mouth two (2) times daily (with meals). metoprolol (LOPRESSOR) 100 mg tablet   Yes No   Sig: Take 100 mg by mouth two (2) times a day. metoprolol succinate (TOPROL-XL) 50 mg XL tablet   Yes No   Sig: Take 100 mg by mouth daily. nitroglycerin (NITROSTAT) 0.4 mg SL tablet   Yes No   Si.4 mg by SubLINGual route every five (5) minutes as needed. ramipril (ALTACE) 5 mg capsule   Yes No   Sig: Take 5 mg by mouth daily. sitaGLIPtin (JANUVIA) 100 mg tablet   Yes No   Sig: Take 100 mg by mouth daily. timolol maleate 0.5 % DrpD ophthalmic solution   Yes No   Sig: Administer 1 Drop to both eyes daily.       Facility-Administered Medications: None     REVIEW OF SYSTEMS:     [x] Limited due to ROS due to  [x]mental status change  []sedated   []intubated   []Total of 12 systems reviewed as follows:    GENERAL: no fever or chills   HEENT: no sinus congestion / hearing or vision changes  PULMONARY: no shortness of breath or cough  Cardiovascular: denies palpitations; no lower extremity edema; denies dizziness / lightheadedness  GASTROINTESTINAL: Denies abdominal pain, constipation, diarrhea, nausea, vomiting or melena / bloody stools  GENITOURINARY: No urinary frequency, urgency, hesitancy or dysuria. MUSCULOSKELETAL: no back / joint or muscle pain, no recent trauma. DERMATOLOGIC: denies pruritis, rashes or open areas   ENDOCRINE: No polyuria, polydipsia, no heat or cold intolerance. HEMATOLOGICAL: No active bleeding  NEUROLOGIC:  + confusion - no focal weakness    Objective:   VITALS:    Visit Vitals  BP (!) 125/59 (BP 1 Location: Left arm, BP Patient Position: At rest)   Pulse 66   Temp 99 °F (37.2 °C)   Resp 19   Ht 5' 5\" (1.651 m)   Wt 74.4 kg (164 lb)   SpO2 100%   BMI 27.29 kg/m²     Temp (24hrs), Av °F (37.2 °C), Min:99 °F (37.2 °C), Max:99 °F (37.2 °C)    PHYSICAL EXAM:   General:          Alert, in NAD  HEENT:            Sclera anicteric. Conjunctiva pink. Neck:               Supple. Trachea midline. No accessory muscle use. CV:                  Regular rate and rhythm. S1S2+  Lungs:             Clear to auscultation bilaterally. No Wheezing or Rhonchi. No rales. Abdomen:        Soft, non-tender. Not distended. Bowel sounds normal.   Extremities:     No cyanosis. No edema. Pulses 2+ b/l  Neurologic:      Alert and oriented X 2 (person and place only states year is 2018). Follows commands  Skin:                Warm and dry. No rashes. LAB DATA REVIEWED:    No components found for: [place]  Recent Labs     10/11/20  1035      K 3.4*      CO2 26   BUN 18   CREA 1.70*   GLU 72*   CA 8.8   WBC 4.8   HGB 12.7*   HCT 37.3   *     IMAGING RESULTS:     [x]  I have personally reviewed the actual   [x]CXR  []CT scan    Assessment/Plan:      Active Problems:    Syncope (10/11/2020)     ___________________________________________________  PLAN:    1.  Syncopal episode - cardiac monitoring /check orthostatics rule out cardiac arrhythmia /?  Mild dehydration /check urinalysis /check head CT  /?  Some confusion with home medications   2. Bilateral Carotid Artery Stenosis -per care everywhere plan for surgical intervention on 10/26/2020 at [address] [facility] surgery consulted discussed with Dr. Nicky Mireles  3. Mild hypokalemia -replaced / follow-up in a.m.  4. Type 2 DM with hypoglycemia - SSI only / hold oral meds  5. Mild dementia  /memory loss -wife states in recent past for the past several months patient has been disoriented to time and situation /she did not know of any dementia diagnosis /patient on Aricept at home  6. History of severe symptomatic aortic stenosis  7. ? Loculated pleural effusion - per chest xray / check PA lateral  8. Mild thrombocytopenia - SCDs for now  9. History of QT prolongation -improved per EKG /noted in the last cardiology note    CODE STATUS introduced with wife Rafiq Euceda [phone]  -she states she is a little taken a back by CODE STATUS question and that her and patient have never discussed.   Encouraged initiation of conversation, discussed full code at this point    Risk of deterioration:  []Low    [x]Moderate  []High    Prophylaxis:  []Lovenox  []Coumadin  []Hep SQ  [x]SCDs  []H2B/PPI    Disposition:  []Home w/ Family   [x] PT,OT,RN   [x]SNF/LTC   []SAH/Rehab    Discussed Code Status:    [x]Full Code      []DNR     ___________________________________________________    Care Plan discussed with:    [x]Patient   [x]Family    []ED Care Manager  []ED Doc   [x]Specialist : Cardiology and vascular surgery  ___________________________________________________  Admitting Provider: Soledad Calderón NP

## 2020-10-11 NOTE — ED PROVIDER NOTES
EMERGENCY DEPARTMENT HISTORY AND PHYSICAL EXAM  This was created with voice recognition software and transcription errors may be present. 10:53 AM  Date: 10/11/2020  Patient Name: Christiano Riggs    History of Presenting Illness     Chief Complaint:    History Provided By:     HPI: Christiano Riggs is a 66 y.o. male past medical history of coronary disease aortic stenosis diabetes glaucoma high cholesterol carotid stenosis who presents with near syncope patient had just gotten out of the shower and was drying himself off when he felt like he was going to pass out he lowered himself to the ground and called out for his family but did not actually hit his head and did not pass out. Denies any significant chest pain headache shortness of breath or abdominal pain prior to or after events. No neurologic symptoms otherwise. PCP: Kizzy Carter MD      Past History     Past Medical History:  Past Medical History:   Diagnosis Date    CAD (coronary artery disease)     Aortic Stenosis    Diabetes ([facility] [address]     Glaucoma     High cholesterol     Hypertension     Ill-defined condition     Bilateral Carotid Artery Stenosis       Past Surgical History:  Past Surgical History:   Procedure Laterality Date    CARDIAC SURG PROCEDURE UNLIST      bypass       Family History:  Family History   Problem Relation Age of Onset    Diabetes Other        Social History:  Social History     Tobacco Use    Smoking status: Never Smoker    Smokeless tobacco: Never Used   Substance Use Topics    Alcohol use: No    Drug use: No       Allergies:  No Known Allergies    Review of Systems     Review of Systems   All other systems reviewed and are negative. 10 point review of systems otherwise negative unless noted in HPI. Physical Exam       Physical Exam  Constitutional:       Appearance: He is well-developed. HENT:      Head: Normocephalic and atraumatic.    Eyes:      Pupils: Pupils are equal, round, and reactive to light. Neck:      Musculoskeletal: Normal range of motion and neck supple. Cardiovascular:      Rate and Rhythm: Normal rate and regular rhythm. Heart sounds: Normal heart sounds. No murmur. No friction rub. Pulmonary:      Effort: Pulmonary effort is normal. No respiratory distress. Breath sounds: Normal breath sounds. No wheezing. Abdominal:      General: There is no distension. Palpations: Abdomen is soft. Tenderness: There is no abdominal tenderness. There is no guarding or rebound. Musculoskeletal: Normal range of motion. Skin:     General: Skin is warm and dry. Neurological:      Mental Status: He is alert and oriented to person, place, and time. Psychiatric:         Behavior: Behavior normal.         Thought Content: Thought content normal.         Diagnostic Study Results     Vital Signs  EKG: EKG shows sinus at 63 with a first-degree AV block at 248 otherwise normal intervals there is no ST elevation or depression and no hypertrophy  Labs:   Imaging:     Medical Decision Making     ED Course: Progress Notes, Reevaluation, and Consults:      Provider Notes (Medical Decision Making): This is a 78-year-old gentleman who presents with near syncope history of aortic stenosis as well as valve replacement currently has significant carotid stenosis    Per cardiology note he has a history of severe symptomatic aortic stenosis status post successful BAV on March 13 with an improvement in MG from 52-35 and NUNU from 0.7-1.1    History of CABG  Echo does look fairly normal EF of 65% from 6/22/2020      CBC unremarkable chem unremarkable trop is negative BNP is negative    78-year-old gentleman history of coronary disease CABG and aortic stenosis presents with near syncope. I did discuss this case with the patient and recommend admission but he would prefer to go home. I think that is reasonable he did not actually pass out but had a presyncopal event.   Regardless I think he should just be observed for 24 hours I did discuss this with him. We are currently in [facility] so I will do got to the waiting room discussed this with his family but ultimately if he wants to go we will discharge him    Discussed the case with the patient's family his wife would like him admitted to the patient is now in agreement to be admitted for observation. I think given his cardiac carotid and aortic stenosis history we ought to observe him overnight for arrhythmia               Diagnosis     Clinical Impression: No diagnosis found. Disposition:    Patient's Medications   Start Taking    No medications on file   Continue Taking    ACCU-CHEK ROBERT PLUS TEST STRP STRIP        ASPIRIN DELAYED-RELEASE 81 MG TABLET    Take 81 mg by mouth daily. BIMATOPROST (LUMIGAN) 0.03 % OPHTHALMIC DROPS    Administer 1 Drop to both eyes every evening. CLOPIDOGREL (PLAVIX) 75 MG TAB    Take 75 mg by mouth daily. DONEPEZIL (ARICEPT) 10 MG TABLET        EZETIMIBE-SIMVASTATIN (VYTORIN 10/40) 10-40 MG PER TABLET    Take 1 Tab by mouth nightly. FIBER CHOICE PO    Take  by mouth. GLIMEPIRIDE (AMARYL) 4 MG TABLET    Take 4 mg by mouth daily. GLUCOSE BLOOD VI TEST STRIPS (ACCU-CHEK ROBERT PLUS TEST STRP) STRIP        LANCETS (ACCU-CHEK SOFTCLIX LANCETS) MISC        METFORMIN (GLUCOPHAGE) 500 MG TABLET    Take 500 mg by mouth two (2) times daily (with meals). METOPROLOL (LOPRESSOR) 100 MG TABLET    Take 100 mg by mouth two (2) times a day. METOPROLOL SUCCINATE (TOPROL-XL) 50 MG XL TABLET    Take 100 mg by mouth daily. MULTIVITAMIN W/IRON, MINERALS (MULTIVITAMIN AND MINERALS PO)    Take  by mouth. NITROGLYCERIN (NITROSTAT) 0.4 MG SL TABLET    0.4 mg by SubLINGual route every five (5) minutes as needed. OTHER,NON-FORMULARY,    Indications: Move Free joint care    RAMIPRIL (ALTACE) 5 MG CAPSULE    Take 5 mg by mouth daily.     SITAGLIPTIN (JANUVIA) 100 MG TABLET    Take 100 mg by mouth daily.    TIMOLOL MALEATE 0.5 % DRPD OPHTHALMIC SOLUTION    Administer 1 Drop to both eyes daily.    These Medications have changed    No medications on file   Stop Taking    No medications on file

## 2020-10-11 NOTE — ROUTINE PROCESS
Bedside shift change report given to Reny Hi RN (oncoming nurse) by Curtis Gurrola RN (offgoing nurse).  Report included the following information SBAR, Kardex, MAR and Cardiac Rhythm SB.

## 2020-10-11 NOTE — ED TRIAGE NOTES
Pt arrived EMS from home for a fall in the bathroom. Pt denies hitting head and denies pain. Hx of diabetes, HTN, CAD, glaucoma, mitral valve replacement, and carotid artert surgery planned later this month.   Blood glucose per

## 2020-10-11 NOTE — ED NOTES
TRANSFER - OUT REPORT:    Verbal report given to Mare(name) on Brayan Munroe  being transferred to [address] [place] [address](unit) for routine progression of care       Report consisted of patients Situation, Background, Assessment and   Recommendations(SBAR). Information from the following report(s) SBAR, Kardex, ED Summary, Procedure Summary, Intake/Output, MAR, Recent Results, Alarm Parameters  and Quality Measures was reviewed with the receiving nurse. Lines:   Peripheral IV 10/11/20 Right Antecubital (Active)   Site Assessment Clean, dry, & intact 10/11/20 1046   Phlebitis Assessment 0 10/11/20 1046   Infiltration Assessment 0 10/11/20 1046   Dressing Status Clean, dry, & intact 10/11/20 1046   Dressing Type Transparent 10/11/20 1046   Hub Color/Line Status Pink 10/11/20 1046        Opportunity for questions and clarification was provided.       Patient transported with:   [employer]

## 2020-10-11 NOTE — PROGRESS NOTES
Atorvastatin 20mg was therapeutically interchanged for simvastatin 40mg per the P&T Committee approved Therapeutic Interchanges Policy.     Silverio Oreilly, [facility], Pharmacist  10/11/2020 5:12 PM

## 2020-10-11 NOTE — PROGRESS NOTES
Latanoprost ophth. Solution  was therapeutically interchanged for Bimatoprost per the P&T Committee approved Therapeutic Interchanges Policy.     Meek Mcdaniels [facility], Pharmacist  10/11/2020 5:15 PM

## 2020-10-12 ENCOUNTER — APPOINTMENT (OUTPATIENT)
Dept: GENERAL RADIOLOGY | Age: 75
DRG: 312 | End: 2020-10-12
Attending: NURSE PRACTITIONER
Payer: MEDICARE

## 2020-10-12 ENCOUNTER — APPOINTMENT (OUTPATIENT)
Dept: NON INVASIVE DIAGNOSTICS | Age: 75
DRG: 312 | End: 2020-10-12
Attending: NURSE PRACTITIONER
Payer: MEDICARE

## 2020-10-12 LAB
ALBUMIN SERPL-MCNC: 3.2 G/DL (ref 3.4–5)
ALBUMIN/GLOB SERPL: 0.9 {RATIO} (ref 0.8–1.7)
ALP SERPL-CCNC: 70 U/L (ref 45–117)
ALT SERPL-CCNC: 30 U/L (ref 16–61)
AMMONIA PLAS-SCNC: <10 UMOL/L (ref 11–32)
ANION GAP SERPL CALC-SCNC: 5 MMOL/L (ref 3–18)
APPEARANCE UR: CLEAR
AST SERPL-CCNC: 42 U/L (ref 10–38)
BASOPHILS # BLD: 0 K/UL (ref 0–0.1)
BASOPHILS NFR BLD: 0 % (ref 0–2)
BILIRUB DIRECT SERPL-MCNC: 0.2 MG/DL (ref 0–0.2)
BILIRUB SERPL-MCNC: 0.6 MG/DL (ref 0.2–1)
BILIRUB UR QL: NEGATIVE
BUN SERPL-MCNC: 18 MG/DL (ref 7–18)
BUN/CREAT SERPL: 11 (ref 12–20)
CALCIUM SERPL-MCNC: 8.8 MG/DL (ref 8.5–10.1)
CHLORIDE SERPL-SCNC: 106 MMOL/L (ref 100–111)
CO2 SERPL-SCNC: 28 MMOL/L (ref 21–32)
COLOR UR: YELLOW
CREAT SERPL-MCNC: 1.62 MG/DL (ref 0.6–1.3)
DIFFERENTIAL METHOD BLD: ABNORMAL
ECHO AV AREA PEAK VELOCITY: 1.71 CM2
ECHO AV AREA VTI: 1.87 CM2
ECHO AV AREA/BSA PEAK VELOCITY: 0.9 CM2/M2
ECHO AV AREA/BSA VTI: 1 CM2/M2
ECHO AV MEAN GRADIENT: 3.26 MMHG
ECHO AV PEAK GRADIENT: 7.95 MMHG
ECHO AV PEAK VELOCITY: 141 CM/S
ECHO AV VTI: 27.54 CM
ECHO LV INTERNAL DIMENSION DIASTOLIC: 4.53 CM (ref 4.2–5.9)
ECHO LV INTERNAL DIMENSION SYSTOLIC: 2.82 CM
ECHO LV IVSD: 0.94 CM (ref 0.6–1)
ECHO LV MASS 2D: 115.8 G (ref 88–224)
ECHO LV MASS INDEX 2D: 63.7 G/M2 (ref 49–115)
ECHO LV POSTERIOR WALL DIASTOLIC: 0.67 CM (ref 0.6–1)
ECHO LVOT DIAM: 1.66 CM
ECHO LVOT PEAK GRADIENT: 5.02 MMHG
ECHO LVOT PEAK VELOCITY: 112.04 CM/S
ECHO LVOT SV: 51.5 ML
ECHO LVOT VTI: 23.88 CM
EOSINOPHIL # BLD: 0 K/UL (ref 0–0.4)
EOSINOPHIL NFR BLD: 0 % (ref 0–5)
EPITH CASTS URNS QL MICRO: NORMAL /LPF (ref 0–5)
ERYTHROCYTE [DISTWIDTH] IN BLOOD BY AUTOMATED COUNT: 13.4 % (ref 11.6–14.5)
EST. AVERAGE GLUCOSE BLD GHB EST-MCNC: 169 MG/DL
GLOBULIN SER CALC-MCNC: 3.6 G/DL (ref 2–4)
GLUCOSE BLD STRIP.AUTO-MCNC: 119 MG/DL (ref 70–110)
GLUCOSE BLD STRIP.AUTO-MCNC: 124 MG/DL (ref 70–110)
GLUCOSE BLD STRIP.AUTO-MCNC: 166 MG/DL (ref 70–110)
GLUCOSE BLD STRIP.AUTO-MCNC: 176 MG/DL (ref 70–110)
GLUCOSE BLD STRIP.AUTO-MCNC: 47 MG/DL (ref 70–110)
GLUCOSE BLD STRIP.AUTO-MCNC: 97 MG/DL (ref 70–110)
GLUCOSE SERPL-MCNC: 92 MG/DL (ref 74–99)
GLUCOSE UR STRIP.AUTO-MCNC: NEGATIVE MG/DL
HBA1C MFR BLD: 7.5 % (ref 4.2–5.6)
HCT VFR BLD AUTO: 34 % (ref 36–48)
HGB BLD-MCNC: 11.4 G/DL (ref 13–16)
HGB UR QL STRIP: ABNORMAL
KETONES UR QL STRIP.AUTO: NEGATIVE MG/DL
LEUKOCYTE ESTERASE UR QL STRIP.AUTO: NEGATIVE
LVOT MG: 2.69 MMHG
LYMPHOCYTES # BLD: 1.1 K/UL (ref 0.9–3.6)
LYMPHOCYTES NFR BLD: 23 % (ref 21–52)
MAGNESIUM SERPL-MCNC: 2 MG/DL (ref 1.6–2.6)
MCH RBC QN AUTO: 29 PG (ref 24–34)
MCHC RBC AUTO-ENTMCNC: 33.5 G/DL (ref 31–37)
MCV RBC AUTO: 86.5 FL (ref 74–97)
MONOCYTES # BLD: 0.7 K/UL (ref 0.05–1.2)
MONOCYTES NFR BLD: 14 % (ref 3–10)
NEUTS SEG # BLD: 3 K/UL (ref 1.8–8)
NEUTS SEG NFR BLD: 63 % (ref 40–73)
NITRITE UR QL STRIP.AUTO: NEGATIVE
PH UR STRIP: 5 [PH] (ref 5–8)
PLATELET # BLD AUTO: 105 K/UL (ref 135–420)
PMV BLD AUTO: 10.3 FL (ref 9.2–11.8)
POTASSIUM SERPL-SCNC: 3.8 MMOL/L (ref 3.5–5.5)
PROT SERPL-MCNC: 6.8 G/DL (ref 6.4–8.2)
PROT UR STRIP-MCNC: 100 MG/DL
RBC # BLD AUTO: 3.93 M/UL (ref 4.7–5.5)
RBC #/AREA URNS HPF: NORMAL /HPF (ref 0–5)
SODIUM SERPL-SCNC: 139 MMOL/L (ref 136–145)
SP GR UR REFRACTOMETRY: 1.02 (ref 1–1.03)
T4 FREE SERPL-MCNC: 1.3 NG/DL (ref 0.7–1.5)
TSH SERPL DL<=0.05 MIU/L-ACNC: 2.09 UIU/ML (ref 0.36–3.74)
UROBILINOGEN UR QL STRIP.AUTO: 1 EU/DL (ref 0.2–1)
WBC # BLD AUTO: 4.8 K/UL (ref 4.6–13.2)
WBC URNS QL MICRO: NORMAL /HPF (ref 0–5)

## 2020-10-12 PROCEDURE — 93308 TTE F-UP OR LMTD: CPT | Performed by: INTERNAL MEDICINE

## 2020-10-12 PROCEDURE — 77010033678 HC OXYGEN DAILY

## 2020-10-12 PROCEDURE — 84443 ASSAY THYROID STIM HORMONE: CPT

## 2020-10-12 PROCEDURE — 2709999900 HC NON-CHARGEABLE SUPPLY

## 2020-10-12 PROCEDURE — 71046 X-RAY EXAM CHEST 2 VIEWS: CPT

## 2020-10-12 PROCEDURE — 74011000258 HC RX REV CODE- 258: Performed by: NURSE PRACTITIONER

## 2020-10-12 PROCEDURE — 99218 HC RM OBSERVATION: CPT

## 2020-10-12 PROCEDURE — 83735 ASSAY OF MAGNESIUM: CPT

## 2020-10-12 PROCEDURE — 93325 DOPPLER ECHO COLOR FLOW MAPG: CPT | Performed by: INTERNAL MEDICINE

## 2020-10-12 PROCEDURE — 74011250637 HC RX REV CODE- 250/637: Performed by: NURSE PRACTITIONER

## 2020-10-12 PROCEDURE — 93308 TTE F-UP OR LMTD: CPT

## 2020-10-12 PROCEDURE — 82962 GLUCOSE BLOOD TEST: CPT

## 2020-10-12 PROCEDURE — 36415 COLL VENOUS BLD VENIPUNCTURE: CPT

## 2020-10-12 PROCEDURE — 74011636637 HC RX REV CODE- 636/637: Performed by: NURSE PRACTITIONER

## 2020-10-12 PROCEDURE — 80076 HEPATIC FUNCTION PANEL: CPT

## 2020-10-12 PROCEDURE — 80048 BASIC METABOLIC PNL TOTAL CA: CPT

## 2020-10-12 PROCEDURE — 83036 HEMOGLOBIN GLYCOSYLATED A1C: CPT

## 2020-10-12 PROCEDURE — 99223 1ST HOSP IP/OBS HIGH 75: CPT | Performed by: INTERNAL MEDICINE

## 2020-10-12 PROCEDURE — 82140 ASSAY OF AMMONIA: CPT

## 2020-10-12 PROCEDURE — 85025 COMPLETE CBC W/AUTO DIFF WBC: CPT

## 2020-10-12 PROCEDURE — 93321 DOPPLER ECHO F-UP/LMTD STD: CPT | Performed by: INTERNAL MEDICINE

## 2020-10-12 PROCEDURE — 84439 ASSAY OF FREE THYROXINE: CPT

## 2020-10-12 PROCEDURE — 99232 SBSQ HOSP IP/OBS MODERATE 35: CPT | Performed by: INTERNAL MEDICINE

## 2020-10-12 RX ADMIN — METOPROLOL TARTRATE 12.5 MG: 25 TABLET, FILM COATED ORAL at 11:26

## 2020-10-12 RX ADMIN — ATORVASTATIN CALCIUM 20 MG: 20 TABLET, FILM COATED ORAL at 21:07

## 2020-10-12 RX ADMIN — CLOPIDOGREL BISULFATE 75 MG: 75 TABLET ORAL at 11:26

## 2020-10-12 RX ADMIN — DONEPEZIL HYDROCHLORIDE 10 MG: 5 TABLET, FILM COATED ORAL at 11:26

## 2020-10-12 RX ADMIN — ASPIRIN 81 MG: 81 TABLET ORAL at 11:26

## 2020-10-12 RX ADMIN — INSULIN LISPRO 2 UNITS: 100 INJECTION, SOLUTION INTRAVENOUS; SUBCUTANEOUS at 16:57

## 2020-10-12 RX ADMIN — DEXTROSE MONOHYDRATE AND SODIUM CHLORIDE 75 ML/HR: 5; .9 INJECTION, SOLUTION INTRAVENOUS at 17:01

## 2020-10-12 RX ADMIN — INSULIN LISPRO 2 UNITS: 100 INJECTION, SOLUTION INTRAVENOUS; SUBCUTANEOUS at 21:07

## 2020-10-12 RX ADMIN — METOPROLOL TARTRATE 12.5 MG: 25 TABLET, FILM COATED ORAL at 20:44

## 2020-10-12 NOTE — PROGRESS NOTES
[facility]ist [facility]  Progress Note    Patient: Tom Vieira Age: 66 y.o. : 1945 MR#: [id number] SSN: [id number]  Date/Time: 10/12/2020    Subjective:     Pt has no complaints, appears comfortable. Assessment/Plan:   66 y o male w/ h/o bilateral significant carotid stenosis, s/p recent repair of aortic stenosis, among other medical conditions, admitted on 10/11/20 after presenting w/ reports of pre syncopal event. -Pre-syncopal event after having just gotten out of the shower, cause remains elusive. Trop wnl. ?due to carotid disease. Cardiology input noted, appreciate assistance. To get echo.    -History of bilateral carotid artery stenosis 80-90%, on CT angio left side noted to be somewhat more stenotic w/ plans for carotid vascularization by [facility]. Vascular surgery consulted. HISTORY OF:    -Severe symptomatic aortic stenosis status post T a VR of aortic valve on 2020 on aspirin and Plavix.  Echo 20  EF 65%, PAP 22mmHg.  -CAD, status post CABG in .  -Sinus bradycardia  -Hypertension  -Dyslipidemia  -Type 2 diabetes mellitus  -Dementia  -Thrombocytopenia    PLAN:  -Continue DAPT  -Check orthostatic blood pressure, heart rate  -Continue to monitor heart rhythm on telemetry, per cardiology notes may benefit from a event monitor  -Continue to trend cardiac enzymes  -Check D-dimer as initial work-up for possibility of PE as cause for presyncopal event    Additional Notes:      Case discussed with:  [x]Patient  [x]Family  []Nursing  []Case Management  DVT Prophylaxis:  []Lovenox  []Hep SQ  []SCDs  []Coumadin   []On Heparin gtt    Objective:   VS:   Visit Vitals  /65 (BP 1 Location: Left arm, BP Patient Position: Sitting)   Pulse 62   Temp 98.2 °F (36.8 °C)   Resp 18   Ht 5' 5\" (1.651 m)   Wt 74.4 kg (164 lb 0.4 oz)   SpO2 99%   BMI 27.29 kg/m²      Tmax/24hrs: Temp (24hrs), Av.7 °F (37.1 °C), Min:97.7 °F (36.5 °C), Max:100.3 °F (37.9 °C)    Input/Output:     Intake/Output Summary (Last 24 hours) at 10/12/2020 1249  Last data filed at 10/12/2020 1057  Gross per 24 hour   Intake 120 ml   Output    Net 120 ml       General:  Alert, awake, oriented to self only  Cardiovascular: rrr, no murmurs   Pulmonary:  ctab  GI:  Soft, nt, nd  Extremities: no edema   Additional:      Labs:    Recent Results (from the past 24 hour(s))   GLUCOSE, POC    Collection Time: 10/11/20  4:45 PM   Result Value Ref Range    Glucose (POC) 47 (LL) 70 - 110 mg/dL   GLUCOSE, POC    Collection Time: 10/11/20  4:45 PM   Result Value Ref Range    Glucose (POC) 47 (LL) 70 - 110 mg/dL   GLUCOSE, POC    Collection Time: 10/11/20  5:13 PM   Result Value Ref Range    Glucose (POC) 87 70 - 110 mg/dL   GLUCOSE, POC    Collection Time: 10/11/20  8:19 PM   Result Value Ref Range    Glucose (POC) 102 70 - 110 mg/dL   URINALYSIS W/ RFLX MICROSCOPIC    Collection Time: 10/11/20 10:13 PM   Result Value Ref Range    Color YELLOW      Appearance CLEAR      Specific gravity 1.022 1.005 - 1.030      pH (UA) 5.0 5.0 - 8.0      Protein 100 (A) NEG mg/dL    Glucose Negative NEG mg/dL    Ketone Negative NEG mg/dL    Bilirubin Negative NEG      Blood TRACE (A) NEG      Urobilinogen 1.0 0.2 - 1.0 EU/dL    Nitrites Negative NEG      Leukocyte Esterase Negative NEG     URINE MICROSCOPIC ONLY    Collection Time: 10/11/20 10:13 PM   Result Value Ref Range    WBC 0 to 1 0 - 5 /hpf    RBC 0 to 1 0 - 5 /hpf    Epithelial cells FEW 0 - 5 /lpf   GLUCOSE, POC    Collection Time: 10/12/20 12:34 AM   Result Value Ref Range    Glucose (POC) 119 (H) 70 - 110 mg/dL   HEMOGLOBIN A1C WITH EAG    Collection Time: 10/12/20  3:53 AM   Result Value Ref Range    Hemoglobin A1c 7.5 (H) 4.2 - 5.6 %    Est. average glucose 169 mg/dL   CBC WITH AUTOMATED DIFF    Collection Time: 10/12/20  3:53 AM   Result Value Ref Range    WBC 4.8 4.6 - 13.2 K/uL    RBC 3.93 (L) 4.70 - 5.50 M/uL    HGB 11.4 (L) 13.0 - 16.0 g/dL    HCT 34.0 (L) 36.0 - 48.0 %    MCV 86.5 74.0 - 97.0 FL    MCH 29.0 24.0 - 34.0 PG    MCHC 33.5 31.0 - 37.0 g/dL    RDW 13.4 11.6 - 14.5 %    PLATELET 537 (L) 093 - 420 K/uL    MPV 10.3 9.2 - 11.8 FL    NEUTROPHILS 63 40 - 73 %    LYMPHOCYTES 23 21 - 52 %    MONOCYTES 14 (H) 3 - 10 %    EOSINOPHILS 0 0 - 5 %    BASOPHILS 0 0 - 2 %    ABS. NEUTROPHILS 3.0 1.8 - 8.0 K/UL    ABS. LYMPHOCYTES 1.1 0.9 - 3.6 K/UL    ABS. MONOCYTES 0.7 0.05 - 1.2 K/UL    ABS. EOSINOPHILS 0.0 0.0 - 0.4 K/UL    ABS. BASOPHILS 0.0 0.0 - 0.1 K/UL    DF AUTOMATED     METABOLIC PANEL, BASIC    Collection Time: 10/12/20  3:53 AM   Result Value Ref Range    Sodium 139 136 - 145 mmol/L    Potassium 3.8 3.5 - 5.5 mmol/L    Chloride 106 100 - 111 mmol/L    CO2 28 21 - 32 mmol/L    Anion gap 5 3.0 - 18 mmol/L    Glucose 92 74 - 99 mg/dL    BUN 18 7.0 - 18 MG/DL    Creatinine 1.62 (H) 0.6 - 1.3 MG/DL    BUN/Creatinine ratio 11 (L) 12 - 20      GFR est AA 50 (L) >60 ml/min/1.73m2    GFR est non-AA 42 (L) >60 ml/min/1.73m2    Calcium 8.8 8.5 - 10.1 MG/DL   HEPATIC FUNCTION PANEL    Collection Time: 10/12/20  3:53 AM   Result Value Ref Range    Protein, total 6.8 6.4 - 8.2 g/dL    Albumin 3.2 (L) 3.4 - 5.0 g/dL    Globulin 3.6 2.0 - 4.0 g/dL    A-G Ratio 0.9 0.8 - 1.7      Bilirubin, total 0.6 0.2 - 1.0 MG/DL    Bilirubin, direct 0.2 0.0 - 0.2 MG/DL    Alk.  phosphatase 70 45 - 117 U/L    AST (SGOT) 42 (H) 10 - 38 U/L    ALT (SGPT) 30 16 - 61 U/L   AMMONIA    Collection Time: 10/12/20  3:53 AM   Result Value Ref Range    Ammonia <10 (L) 11 - 32 UMOL/L   TSH 3RD GENERATION    Collection Time: 10/12/20  3:53 AM   Result Value Ref Range    TSH 2.09 0.36 - 3.74 uIU/mL   T4, FREE    Collection Time: 10/12/20  3:53 AM   Result Value Ref Range    T4, Free 1.3 0.7 - 1.5 NG/DL   MAGNESIUM    Collection Time: 10/12/20  3:53 AM   Result Value Ref Range    Magnesium 2.0 1.6 - 2.6 mg/dL   GLUCOSE, POC    Collection Time: 10/12/20  6:14 AM   Result Value Ref Range    Glucose (POC) 97 70 - 110 mg/dL   GLUCOSE, POC    Collection Time: 10/12/20 12:18 PM   Result Value Ref Range    Glucose (POC) 124 (H) 70 - 110 mg/dL     Additional Data Reviewed:      Signed By: Kalina Jones MD     October 12, 2020 12:49 PM

## 2020-10-12 NOTE — CONSULTS
[facility] - Consult Note    Date of  Admission: 10/11/2020 10:17 AM     Primary Care Physician:  Awilad Graves MD     Plan:       1. Syncope- unclear etiology. Will monitor closely for any recurrence. Monitor Telemetry for any significant patti or tachy arrhthymias. Check orthostatics BP's obtain limited Echo to reassess lvf, AVR  would consider Event monitor outpatient if indicated. 2. Hx of severe symptomatic aortic stenosis- s/p TAVR of aortic valve with implantation of Evolut Pro 26 mm stent valve by Dr. Dawson Jin on 5/20/20 SNG- on asa and Plavix  Echo 6/22/20  EF 65%, PAP 22mmHg  3. Conary artery disease- cath 2008 and unable to stent LAD. cath 3/13/20 -- patent LEWIS to LAD, patent SVG to LCx, occluded SVG to RCA, LM heavily diseased, LAD occluded and distal to the second touchdown of LIMA graft there is 70 to 80% lesion, RCA heavily diseased with occluded graft to the distal segment - patient was managed medically   4. Hx of  CABG 1997  5. Sinus bradycardia-continue  BB per parameters   6. Hypertension - elevated continue home medications   7. Hyperlipidemia- continue statin   8. Diabetes mellitus- medications per medical team   9. Carotid artery disease --s/p left endaderectomy 9/20   10. Dementia- confused and disoriented   11. Hx of Thrombocytopenia     Patient is a 76-year-old male with recent history of TAVR, CAD seen for syncope. Patient does not recall the event clearly. No prior history of syncope. Cardiac enzymes negative. Repeat an additional set tomorrow. Continue supportive care. Echo reviewed shows normal LV function with normally functioning prosthetic valve and mild paravalvular leak. Cardiac cath 3/20   1. Severe aortic valve stenosis, status post successful aortic   valvuloplasty. 2. Severe native coronary artery disease.   3. Patent left internal mammary artery to left anterior descending with   distal      left anterior descending disease that could be treated with   percutaneous      coronary intervention. 4. Saphenous vein graft open to left circumflex. 5. Occluded saphenous vein graft to right coronary artery, but adequately      revascularized vessel such that intervention to the right is not   needed. RECOMMENDATIONS:  1. We will arrange CTA and remainder of cardiac workup. 2. We will have patient follow up in [place] [id number].. 3. I would favor TAVR and PCI of LAD one week prior and will be happy to   XR Results (most recent):  Results from Hospital Encounter encounter on 10/11/20   XR CHEST PORT    Narrative PORTABLE CHEST RADIOGRAPH     CPT CODE: 79995     INDICATION: Fall/syncope. COMPARISON: 2/3/2014. FINDINGS:    Frontal view of the chest obtained at 1211 hours. Lungs are hypoinflated. Status post median sternotomy, aortic valvuloplasty and likely CABG. Cardiomediastinal silhouette is unchanged. Pulmonary vasculature is normal given  hypoinflation. There is subsegmental atelectasis at the left lung base. Similar  calcific densities overlying the chest walls. Increased subpleural density on  the right. No significant pleural effusion or pneumothorax. Impression IMPRESSION:    There appears to be an increased right pleural-based density, possibly loculated  pleural effusion. Limited evaluation given hypoinflation. Recommend follow-up PA  and lateral radiographs when patient is able. Asbestos related pleural disease. Assessment:     Hospital Problems  Never Reviewed          Codes Class Noted POA    Syncope ICD-10-CM: R55  ICD-9-CM: 780.2  10/11/2020 Unknown                   History of Present Illness: This is a 66 y.o. male admitted for Syncope [R55]. Patient with PMHx of hypertension, cad, s/p TAVAR, s/p CABG, and diabetes  with syncopal episode at home. Patient is a poor historian and defers most of his history to his wife.   In conversation with wife patient was showering earlier today when he called out for her was able to walk about 10 feet or so per wife and by the time wife made it to their bedroom she found him collapsed on the bedroom floor. Wife states patient has not had any other falls in the past and has only complained of dizziness 1 time in the past.  Wife states that patient has been more active recently including working outside more. Wife is unaware of how his sugars have been recently as states that patient does not tell her. On my exam patient is confused unable to provide detail history. Denies any chest pain chest pressure no SOB no other c/o voiced.  Information obtained from patient's records            Past Medical History:     Past Medical History:   Diagnosis Date    CAD (coronary artery disease)     Aortic Stenosis    Carotid artery stenosis     Diabetes ([facility] [address]     Glaucoma     High cholesterol     Hypertension     Ill-defined condition     Bilateral Carotid Artery Stenosis         Social History:     Social History     Socioeconomic History    Marital status:      Spouse name: Not on file    Number of children: Not on file    Years of education: Not on file    Highest education level: Not on file   Tobacco Use    Smoking status: Never Smoker    Smokeless tobacco: Never Used   Substance and Sexual Activity    Alcohol use: No    Drug use: No        Family History:     Family History   Problem Relation Age of Onset    Diabetes Other         Medications:   No Known Allergies     Current Facility-Administered Medications   Medication Dose Route Frequency    insulin lispro (HUMALOG) injection   SubCUTAneous AC&HS    glucose chewable tablet 16 g  4 Tab Oral PRN    glucagon (GLUCAGEN) injection 1 mg  1 mg IntraMUSCular PRN    dextrose (D50W) injection syrg 12.5-25 g  25-50 mL IntraVENous PRN    aspirin delayed-release tablet 81 mg  81 mg Oral DAILY    atorvastatin (LIPITOR) tablet 20 mg  20 mg Oral QHS    donepeziL (ARICEPT) tablet 10 mg  10 mg Oral DAILY    metoprolol tartrate (LOPRESSOR) tablet 12.5 mg  12.5 mg Oral Q12H    clopidogreL (PLAVIX) tablet 75 mg  75 mg Oral DAILY    promethazine (PHENERGAN) tablet 12.5 mg  12.5 mg Oral Q4H PRN    acetaminophen (TYLENOL) tablet 650 mg  650 mg Oral Q4H PRN    dextrose 5% and 0.9% NaCl infusion  75 mL/hr IntraVENous CONTINUOUS    [START ON 10/14/2020] cyanocobalamin tablet 1,000 mcg  1,000 mcg Oral DAILY    cyanocobalamin (VITAMIN B12) injection 100 mcg  100 mcg SubCUTAneous DAILY        Review Of Systems:       Constitutional: No fever, no chills, no weight loss, no night sweats   HEENT: No epistaxis, no nasal drainage, no difficulty in swallowing, no redness in eyes  Respiratory: Respiratory: positive for cough, sputum, hemoptysis, pleurisy/chest pain, asthma or dyspnea on exertion, negative for cough, sputum, hemoptysis, pleurisy/chest pain, wheezing, dyspnea on exertion or emphysema  Cardiovascular:  syncope  Gastrointestinal: no abd pain, no vomiting, no diarrhea, no bleeding symptoms  Genitourinary: No urinary symptoms or hematuria  Integument/breast: No ulcers or rashes  Musculoskeletal: no muscle pain, no weakness  Neurological: No focal weakness, no seizures, no headaches  Behvioral/Psych: No anxiety, no depression         Physical Exam:     Visit Vitals  BP (!) 140/74   Pulse 62   Temp 97.7 °F (36.5 °C)   Resp 19   Ht 5' 5\" (1.651 m)   Wt 74.4 kg (164 lb 0.4 oz)   SpO2 96%   BMI 27.29 kg/m²     BP Readings from Last 3 Encounters:   10/12/20 (!) 140/74   05/15/20 172/79   05/02/19 137/70     Pulse Readings from Last 3 Encounters:   10/12/20 62   05/15/20 (!) 54   05/02/19 65     Wt Readings from Last 3 Encounters:   10/11/20 74.4 kg (164 lb 0.4 oz)   02/03/14 76.7 kg (169 lb)   06/25/13 79.6 kg (175 lb 8 oz)       General:  alert, cooperative, no distress, appears stated age, confused, disoriented  Skin: Warm and dry, acyanotic, normal color. Head: Normocephalic, atraumatic.     Eyes: Sclerae anicteric, conjunctivae without injection. Neck:  nontender, no nuchal rigidity, no masses, no stridor, no carotid bruit, no JVD  Lungs:  clear to auscultation bilaterally  Heart:  regular rate and rhythm, S1, S2 normal, no murmur, click, rub or gallop  Abdomen:  abdomen is soft without significant tenderness, masses, organomegaly or guarding  Extremities:  extremities normal, atraumatic, no cyanosis or edema. Peripheral pulses   Neurological: grossly intact. No focal abnormalities, moves all extremities well.   Psychiatric Affect: The patient is awake, alert and oriented to person only   Data Review:     Recent Results (from the past 48 hour(s))   EKG, 12 LEAD, INITIAL    Collection Time: 10/11/20 10:23 AM   Result Value Ref Range    Ventricular Rate 63 BPM    Atrial Rate 63 BPM    P-R Interval 248 ms    QRS Duration 88 ms    Q-T Interval 444 ms    QTC Calculation (Bezet) 454 ms    Calculated P Axis 40 degrees    Calculated R Axis -27 degrees    Calculated T Axis 21 degrees    Diagnosis       Sinus rhythm with sinus arrhythmia with 1st degree AV block  Moderate voltage criteria for LVH, may be normal variant  Borderline ECG  When compared with ECG of 10-DEC-2010 19:03,  [place] interval has increased  Confirmed by Michael Higuera ([id number]) on 10/11/2020 30:96:13 AM     METABOLIC PANEL, BASIC    Collection Time: 10/11/20 10:35 AM   Result Value Ref Range    Sodium 139 136 - 145 mmol/L    Potassium 3.4 (L) 3.5 - 5.5 mmol/L    Chloride 105 100 - 111 mmol/L    CO2 26 21 - 32 mmol/L    Anion gap 8 3.0 - 18 mmol/L    Glucose 72 (L) 74 - 99 mg/dL    BUN 18 7.0 - 18 MG/DL    Creatinine 1.70 (H) 0.6 - 1.3 MG/DL    BUN/Creatinine ratio 11 (L) 12 - 20      GFR est AA 48 (L) >60 ml/min/1.73m2    GFR est non-AA 39 (L) >60 ml/min/1.73m2    Calcium 8.8 8.5 - 10.1 MG/DL   CBC WITH AUTOMATED DIFF    Collection Time: 10/11/20 10:35 AM   Result Value Ref Range    WBC 4.8 4.6 - 13.2 K/uL    RBC 4.35 (L) 4.70 - 5.50 M/uL    HGB 12.7 (L) 13.0 - 16.0 g/dL HCT 37.3 36.0 - 48.0 %    MCV 85.7 74.0 - 97.0 FL    MCH 29.2 24.0 - 34.0 PG    MCHC 34.0 31.0 - 37.0 g/dL    RDW 13.2 11.6 - 14.5 %    PLATELET 797 (L) 173 - 420 K/uL    MPV 10.8 9.2 - 11.8 FL    NEUTROPHILS 79 (H) 40 - 73 %    LYMPHOCYTES 11 (L) 21 - 52 %    MONOCYTES 10 3 - 10 %    EOSINOPHILS 0 0 - 5 %    BASOPHILS 0 0 - 2 %    ABS. NEUTROPHILS 3.7 1.8 - 8.0 K/UL    ABS. LYMPHOCYTES 0.5 (L) 0.9 - 3.6 K/UL    ABS. MONOCYTES 0.5 0.05 - 1.2 K/UL    ABS. EOSINOPHILS 0.0 0.0 - 0.4 K/UL    ABS.  BASOPHILS 0.0 0.0 - 0.1 K/UL    DF AUTOMATED     CARDIAC PANEL,(CK, CKMB & TROPONIN)    Collection Time: 10/11/20 10:35 AM   Result Value Ref Range    CK - MB 1.8 <3.6 ng/ml    CK-MB Index 2.1 0.0 - 4.0 %    CK 86 39 - 308 U/L    Troponin-I, QT <0.02 0.0 - 0.045 NG/ML   NT-PRO BNP    Collection Time: 10/11/20 10:35 AM   Result Value Ref Range    NT pro- 0 - 1,800 PG/ML   VITAMIN B12 & FOLATE    Collection Time: 10/11/20 10:35 AM   Result Value Ref Range    Vitamin B12 169 (L) 211 - 911 pg/mL    Folate 13.8 3.10 - 17.50 ng/mL   VITAMIN D, 25 HYDROXY    Collection Time: 10/11/20 10:35 AM   Result Value Ref Range    Vitamin D 25-Hydroxy 13.4 (L) 30 - 100 ng/mL   GLUCOSE, POC    Collection Time: 10/11/20  4:45 PM   Result Value Ref Range    Glucose (POC) 47 (LL) 70 - 110 mg/dL   GLUCOSE, POC    Collection Time: 10/11/20  5:13 PM   Result Value Ref Range    Glucose (POC) 87 70 - 110 mg/dL   GLUCOSE, POC    Collection Time: 10/11/20  8:19 PM   Result Value Ref Range    Glucose (POC) 102 70 - 110 mg/dL   URINALYSIS W/ RFLX MICROSCOPIC    Collection Time: 10/11/20 10:13 PM   Result Value Ref Range    Color YELLOW      Appearance CLEAR      Specific gravity 1.022 1.005 - 1.030      pH (UA) 5.0 5.0 - 8.0      Protein 100 (A) NEG mg/dL    Glucose Negative NEG mg/dL    Ketone Negative NEG mg/dL    Bilirubin Negative NEG      Blood TRACE (A) NEG      Urobilinogen 1.0 0.2 - 1.0 EU/dL    Nitrites Negative NEG      Leukocyte Esterase Negative NEG     URINE MICROSCOPIC ONLY    Collection Time: 10/11/20 10:13 PM   Result Value Ref Range    WBC 0 to 1 0 - 5 /hpf    RBC 0 to 1 0 - 5 /hpf    Epithelial cells FEW 0 - 5 /lpf   GLUCOSE, POC    Collection Time: 10/12/20 12:34 AM   Result Value Ref Range    Glucose (POC) 119 (H) 70 - 110 mg/dL   HEMOGLOBIN A1C WITH EAG    Collection Time: 10/12/20  3:53 AM   Result Value Ref Range    Hemoglobin A1c 7.5 (H) 4.2 - 5.6 %    Est. average glucose 169 mg/dL   CBC WITH AUTOMATED DIFF    Collection Time: 10/12/20  3:53 AM   Result Value Ref Range    WBC 4.8 4.6 - 13.2 K/uL    RBC 3.93 (L) 4.70 - 5.50 M/uL    HGB 11.4 (L) 13.0 - 16.0 g/dL    HCT 34.0 (L) 36.0 - 48.0 %    MCV 86.5 74.0 - 97.0 FL    MCH 29.0 24.0 - 34.0 PG    MCHC 33.5 31.0 - 37.0 g/dL    RDW 13.4 11.6 - 14.5 %    PLATELET 363 (L) 538 - 420 K/uL    MPV 10.3 9.2 - 11.8 FL    NEUTROPHILS 63 40 - 73 %    LYMPHOCYTES 23 21 - 52 %    MONOCYTES 14 (H) 3 - 10 %    EOSINOPHILS 0 0 - 5 %    BASOPHILS 0 0 - 2 %    ABS. NEUTROPHILS 3.0 1.8 - 8.0 K/UL    ABS. LYMPHOCYTES 1.1 0.9 - 3.6 K/UL    ABS. MONOCYTES 0.7 0.05 - 1.2 K/UL    ABS. EOSINOPHILS 0.0 0.0 - 0.4 K/UL    ABS.  BASOPHILS 0.0 0.0 - 0.1 K/UL    DF AUTOMATED     METABOLIC PANEL, BASIC    Collection Time: 10/12/20  3:53 AM   Result Value Ref Range    Sodium 139 136 - 145 mmol/L    Potassium 3.8 3.5 - 5.5 mmol/L    Chloride 106 100 - 111 mmol/L    CO2 28 21 - 32 mmol/L    Anion gap 5 3.0 - 18 mmol/L    Glucose 92 74 - 99 mg/dL    BUN 18 7.0 - 18 MG/DL    Creatinine 1.62 (H) 0.6 - 1.3 MG/DL    BUN/Creatinine ratio 11 (L) 12 - 20      GFR est AA 50 (L) >60 ml/min/1.73m2    GFR est non-AA 42 (L) >60 ml/min/1.73m2    Calcium 8.8 8.5 - 10.1 MG/DL   HEPATIC FUNCTION PANEL    Collection Time: 10/12/20  3:53 AM   Result Value Ref Range    Protein, total 6.8 6.4 - 8.2 g/dL    Albumin 3.2 (L) 3.4 - 5.0 g/dL    Globulin 3.6 2.0 - 4.0 g/dL    A-G Ratio 0.9 0.8 - 1.7      Bilirubin, total 0.6 0.2 - 1.0 MG/DL Bilirubin, direct 0.2 0.0 - 0.2 MG/DL    Alk. phosphatase 70 45 - 117 U/L    AST (SGOT) 42 (H) 10 - 38 U/L    ALT (SGPT) 30 16 - 61 U/L   AMMONIA    Collection Time: 10/12/20  3:53 AM   Result Value Ref Range    Ammonia <10 (L) 11 - 32 UMOL/L   TSH 3RD GENERATION    Collection Time: 10/12/20  3:53 AM   Result Value Ref Range    TSH 2.09 0.36 - 3.74 uIU/mL   T4, FREE    Collection Time: 10/12/20  3:53 AM   Result Value Ref Range    T4, Free 1.3 0.7 - 1.5 NG/DL   MAGNESIUM    Collection Time: 10/12/20  3:53 AM   Result Value Ref Range    Magnesium 2.0 1.6 - 2.6 mg/dL   GLUCOSE, POC    Collection Time: 10/12/20  6:14 AM   Result Value Ref Range    Glucose (POC) 97 70 - 110 mg/dL       No intake or output data in the 24 hours ending 10/12/20 0856    Cardiographics:       EKG Results     Procedure 720 Value Units Date/Time    EKG, 12 LEAD, INITIAL [[id number]] Collected:  10/11/20 1023    Order Status:  Completed Updated:  10/11/20 1112     Ventricular Rate 63 BPM      Atrial Rate 63 BPM      P-R Interval 248 ms      QRS Duration 88 ms      Q-T Interval 444 ms      QTC Calculation (Bezet) 454 ms      Calculated P Axis 40 degrees      Calculated R Axis -27 degrees      Calculated T Axis 21 degrees      Diagnosis --     Sinus rhythm with sinus arrhythmia with 1st degree AV block  Moderate voltage criteria for LVH, may be normal variant  Borderline ECG  When compared with ECG of 10-DEC-2010 19:03,  [place] interval has increased  Confirmed by Sherine Aburto ([id number]) on 10/11/2020 11:11:57 AM                 Signed By: Shikha Hazel NP-FABIANA Phone [phone] supervised    October 12, 2020      I have independently evaluated and examined the patient. All relevant labs and testing data's are reviewed. Care plan discussed and updated after review.     Michele Brown MD

## 2020-10-12 NOTE — PROGRESS NOTES
Reason for Admission:  Syncope [R55]                 RUR Score:    N/A            Plan for utilizing home health:    yes                      Likelihood of Readmission:   LOW                         Transition of Care Plan:              Initial assessment completed with patient. Cognitive status of patient: oriented to time, place, person and situation. Face sheet information confirmed:  yes. The patient designates spouse to participate in his discharge plan and to receive any needed information. This patient lives in a single family home with patient and spouse. Patient is able to navigate steps as needed. Prior to hospitalization, patient was considered to be independent with ADLs/IADLS : yes . Patient has a current ACP document on file: no  The patient and spouse will be available to transport patient home upon discharge. The patient already has [employer], medical equipment available in the home. Patient is not currently active with home health. Patient has not stayed in a skilled nursing facility or rehab. This patient is on dialysis :no    List of available Home Health agencies were provided and reviewed with the patient prior to discharge. Freedom of choice signed: yes, for [facility] home care. Currently, the discharge plan is Home with [address] [place] [address] [facility]. Patient and family refusing SNF. Family not in agreement with the no visitor policy. The patient states that he can obtain his medications from the pharmacy, and take his medications as directed. Patient's current insurance is Medicare and [facility] [employer]  PCP Verified by CM:  Yes  Mode of Transport at Discharge: Self  Current Support Network: Lives with Spouse  Confirm Follow Up Transport: Family  The Plan for Transition of Care is Related to the Following Treatment Goals : Home health  The Patient and/or Patient Representative was Provided with a Choice of Provider and Agrees with the Discharge Plan?: Yes  Freedom of Choice List was Provided with Basic Dialogue that Supports the Patient's Individualized Plan of Care/Goals, Treatment Preferences and Shares the Quality Data Associated with the Providers?: Yes  Discharge Location  Discharge Placement: Home with home health        Trip Malone RN BSN  Care Manager  [phone]\

## 2020-10-12 NOTE — ROUTINE PROCESS
Bedside shift change report given to Natividad Dominguez (oncoming nurse) by Dolly Goel (offgoing nurse). Report included the following information SBAR, Procedure Summary, MAR and Recent Results

## 2020-10-12 NOTE — ROUTINE PROCESS
Bedside shift change report given to [address] [place] [address] [address] (oncoming nurse) by Lani Guzman (offgoing nurse). Report included the following information SBAR, Kardex and MAR.

## 2020-10-12 NOTE — PROGRESS NOTES
Problem: Falls - Risk of  Goal: *Absence of Falls  Description: Document Awilda Lawler Fall Risk and appropriate interventions in the flowsheet.   Outcome: Progressing Towards Goal  Note: Fall Risk Interventions:            Medication Interventions: Assess postural VS orthostatic hypotension, Teach patient to arise slowly, Patient to call before getting OOB         History of Falls Interventions: Bed/chair exit alarm, Door open when patient unattended, Room close to nurse's station         Problem: Patient Education: Go to Patient Education Activity  Goal: Patient/Family Education  Outcome: Progressing Towards Goal     Problem: Syncope  Goal: *Absence of injury  Outcome: Progressing Towards Goal  Goal: Decrease or eliminate episodes of syncope  Outcome: Progressing Towards Goal     Problem: Patient Education: Go to Patient Education Activity  Goal: Patient/Family Education  Outcome: Progressing Towards Goal

## 2020-10-12 NOTE — PROGRESS NOTES
conducted an initial consultation and Spiritual Assessment for Brayan Munroe, who is a 66 y. o.,male. Patients Primary Language is: [place]. According to the patients EMR Taoism Affiliation is: No Alevism. The reason the Patient came to the hospital is:   Patient Active Problem List    Diagnosis Date Noted    Syncope 10/11/2020        The  provided the following Interventions:  Initiated a relationship of care and support. Explored issues of sherron, belief, spirituality and Taoist/ritual needs while hospitalized. Listened empathically. Provided information about Spiritual Care Services. Offered prayer and assurance of continued prayers on patient's behalf. Chart reviewed. The following outcomes where achieved:  Patient shared limited information about both their medical narrative and spiritual journey/beliefs. Patient processed feeling about current hospitalization. Patient expressed gratitude for 's visit. Assessment:  Patient does not have any Taoist/cultural needs that will affect patients preferences in health care. There are no spiritual or Taoist issues which require intervention at this time. Plan:  Chaplains will continue to follow and will provide pastoral care on an as needed/requested basis.  recommends bedside caregivers page  on duty if patient shows signs of acute spiritual or emotional distress.       [facility]   [phone]

## 2020-10-12 NOTE — PROGRESS NOTES
Problem: Falls - Risk of  Goal: *Absence of Falls  Outcome: Progressing Towards Goal  Note: Fall Risk Interventions:            Medication Interventions: Assess postural VS orthostatic hypotension         History of Falls Interventions: Bed/chair exit alarm         Problem: Syncope  Goal: *Absence of injury  Outcome: Progressing Towards Goal

## 2020-10-13 ENCOUNTER — HOME HEALTH ADMISSION (OUTPATIENT)
Dept: HOME HEALTH SERVICES | Facility: HOME HEALTH | Age: 75
End: 2020-10-13
Payer: MEDICARE

## 2020-10-13 VITALS
SYSTOLIC BLOOD PRESSURE: 133 MMHG | RESPIRATION RATE: 19 BRPM | HEART RATE: 60 BPM | TEMPERATURE: 97.4 F | HEIGHT: 65 IN | BODY MASS INDEX: 27.33 KG/M2 | WEIGHT: 164.02 LBS | DIASTOLIC BLOOD PRESSURE: 67 MMHG | OXYGEN SATURATION: 97 %

## 2020-10-13 LAB
ANION GAP SERPL CALC-SCNC: 7 MMOL/L (ref 3–18)
BACTERIA SPEC CULT: NORMAL
BUN SERPL-MCNC: 16 MG/DL (ref 7–18)
BUN/CREAT SERPL: 12 (ref 12–20)
CALCIUM SERPL-MCNC: 8.4 MG/DL (ref 8.5–10.1)
CC UR VC: NORMAL
CHLORIDE SERPL-SCNC: 108 MMOL/L (ref 100–111)
CO2 SERPL-SCNC: 27 MMOL/L (ref 21–32)
CREAT SERPL-MCNC: 1.37 MG/DL (ref 0.6–1.3)
ERYTHROCYTE [DISTWIDTH] IN BLOOD BY AUTOMATED COUNT: 13.3 % (ref 11.6–14.5)
GLUCOSE BLD STRIP.AUTO-MCNC: 100 MG/DL (ref 70–110)
GLUCOSE BLD STRIP.AUTO-MCNC: 47 MG/DL (ref 70–110)
GLUCOSE SERPL-MCNC: 118 MG/DL (ref 74–99)
HCT VFR BLD AUTO: 33.6 % (ref 36–48)
HGB BLD-MCNC: 11.3 G/DL (ref 13–16)
MCH RBC QN AUTO: 29.4 PG (ref 24–34)
MCHC RBC AUTO-ENTMCNC: 33.6 G/DL (ref 31–37)
MCV RBC AUTO: 87.3 FL (ref 74–97)
PLATELET # BLD AUTO: 119 K/UL (ref 135–420)
PMV BLD AUTO: 10.1 FL (ref 9.2–11.8)
POTASSIUM SERPL-SCNC: 3.9 MMOL/L (ref 3.5–5.5)
RBC # BLD AUTO: 3.85 M/UL (ref 4.7–5.5)
SERVICE CMNT-IMP: NORMAL
SODIUM SERPL-SCNC: 142 MMOL/L (ref 136–145)
TROPONIN I SERPL-MCNC: <0.02 NG/ML (ref 0–0.04)
WBC # BLD AUTO: 4.3 K/UL (ref 4.6–13.2)

## 2020-10-13 PROCEDURE — 97165 OT EVAL LOW COMPLEX 30 MIN: CPT

## 2020-10-13 PROCEDURE — 80048 BASIC METABOLIC PNL TOTAL CA: CPT

## 2020-10-13 PROCEDURE — 74011250637 HC RX REV CODE- 250/637: Performed by: NURSE PRACTITIONER

## 2020-10-13 PROCEDURE — 99239 HOSP IP/OBS DSCHRG MGMT >30: CPT | Performed by: EMERGENCY MEDICINE

## 2020-10-13 PROCEDURE — 82962 GLUCOSE BLOOD TEST: CPT

## 2020-10-13 PROCEDURE — 36415 COLL VENOUS BLD VENIPUNCTURE: CPT

## 2020-10-13 PROCEDURE — 84484 ASSAY OF TROPONIN QUANT: CPT

## 2020-10-13 PROCEDURE — 97161 PT EVAL LOW COMPLEX 20 MIN: CPT

## 2020-10-13 PROCEDURE — 99232 SBSQ HOSP IP/OBS MODERATE 35: CPT | Performed by: NURSE PRACTITIONER

## 2020-10-13 PROCEDURE — 99218 HC RM OBSERVATION: CPT

## 2020-10-13 PROCEDURE — 2709999900 HC NON-CHARGEABLE SUPPLY

## 2020-10-13 PROCEDURE — 96372 THER/PROPH/DIAG INJ SC/IM: CPT

## 2020-10-13 PROCEDURE — 65660000000 HC RM CCU STEPDOWN

## 2020-10-13 PROCEDURE — 74011250636 HC RX REV CODE- 250/636: Performed by: HOSPITALIST

## 2020-10-13 PROCEDURE — 85027 COMPLETE CBC AUTOMATED: CPT

## 2020-10-13 PROCEDURE — 97116 GAIT TRAINING THERAPY: CPT

## 2020-10-13 RX ORDER — LANOLIN ALCOHOL/MO/W.PET/CERES
1000 CREAM (GRAM) TOPICAL DAILY
Qty: 30 TAB | Refills: 0 | Status: SHIPPED | OUTPATIENT
Start: 2020-10-14 | End: 2022-01-26

## 2020-10-13 RX ORDER — METOPROLOL TARTRATE 25 MG/1
12.5 TABLET, FILM COATED ORAL 2 TIMES DAILY
Qty: 30 TAB | Refills: 0 | Status: SHIPPED | OUTPATIENT
Start: 2020-10-13

## 2020-10-13 RX ADMIN — CLOPIDOGREL BISULFATE 75 MG: 75 TABLET ORAL at 08:47

## 2020-10-13 RX ADMIN — METOPROLOL TARTRATE 12.5 MG: 25 TABLET, FILM COATED ORAL at 08:47

## 2020-10-13 RX ADMIN — CYANOCOBALAMIN 100 MCG: 1000 INJECTION, SOLUTION INTRAMUSCULAR at 08:48

## 2020-10-13 RX ADMIN — DONEPEZIL HYDROCHLORIDE 10 MG: 5 TABLET, FILM COATED ORAL at 08:47

## 2020-10-13 RX ADMIN — ASPIRIN 81 MG: 81 TABLET ORAL at 08:47

## 2020-10-13 NOTE — HOME CARE
Discharge noted for today. Received home health referral for [address] for SN, PT, OT, and MSW. Spoke with patient and his wife, explained services and answered all questions. Demographics verified. Referral processed and arranged through central office. Patient has the following DME: cane.  Mary Dubin, [address] Liaison

## 2020-10-13 NOTE — PROGRESS NOTES
Problem: Self Care Deficits Care Plan (Adult)  Goal: *Acute Goals and Plan of Care (Insert Text)  Outcome: Resolved/Met     OCCUPATIONAL THERAPY EVALUATION/DISCHARGE    Patient: Razia Santos (77 y.o. male)  Date: 10/13/2020  Primary Diagnosis: Syncope [R55]  Syncope [R55]        Precautions:  Falls  PLOF: Pt reports he lives with his wife, son, and two granddaughters. Pt was (I) with basic self-care/ADLs and functional mobility without AD PTA. ASSESSMENT AND RECOMMENDATIONS:  Upon entering room, pt seated in recliner, alert, and agreeable to therapy session. Based on the objective data described below, the patient presents he is Mod(I) with basic self-care/ADLs. Pt denied pain, dizziness, and SOB. No LOB noted during functional transfers. Will defer to PT for further functional balance and mobility tasks. Educated pt on the role of Ot and evaluation process with pt demo good understanding. Pt reports he has good family support when he returns home to assist PRN. As pt presents he is at his baseline, skilled occupational therapy is not indicated at this time.     BP checked in all positions:  Sitting in recliner: 140/83 mmHg  Standin/75 mmHg  After activity/toilet transfers: 134/78 mmHg    Discharge Recommendations: Home with family support  Further Equipment Recommendations for Discharge: N/A     SUBJECTIVE:   Patient stated I live with my wife, son, and two granddaughters    OBJECTIVE DATA SUMMARY:     Past Medical History:   Diagnosis Date    CAD (coronary artery disease)     Aortic Stenosis    Carotid artery stenosis     Diabetes (HCC)     Glaucoma     High cholesterol     Hypertension     Ill-defined condition     Bilateral Carotid Artery Stenosis     Past Surgical History:   Procedure Laterality Date    CARDIAC SURG PROCEDURE UNLIST      bypass     Barriers to Learning/Limitations: None  Compensate with: visual, verbal, tactile, kinesthetic cues/model    Home Situation:   Home Situation  Home Environment: Private residence  # Steps to Enter: 3  One/Two Story Residence: One story  Living Alone: No  Support Systems: Family member(s), Spouse/Significant Other/Partner  Patient Expects to be Discharged to[de-identified] Private residence  Current DME Used/Available at Home: None  []     Right hand dominant   []     Left hand dominant    Cognitive/Behavioral Status:  Neurologic State: Alert  Orientation Level: Oriented to person  Cognition: Follows commands  Safety/Judgement: Fall prevention    Skin: Visible skin appeared intact  Edema: None noted    Vision/Perceptual:    Pt able to read the clock without difficulty      Coordination: BUE  Coordination: Within functional limits  Fine Motor Skills-Upper: Left Intact; Right Intact    Gross Motor Skills-Upper: Left Intact; Right Intact    Balance:  Sitting: Intact  Standing: Without support  Standing - Static: Good  Standing - Dynamic : Good    Strength: BUE  Strength: Within functional limits    Tone & Sensation: BUE  Tone: Normal  Sensation: Intact    Range of Motion: BUE  AROM: Within functional limits      Functional Mobility and Transfers for ADLs:  Bed Mobility:  Pt sitting up in recliner upon arrival.  Transfers:  Sit to Stand: Modified independent  Stand to Sit: Modified independent   Toilet Transfer : Modified independent    ADL Assessment:  Feeding: Modified independent    Oral Facial Hygiene/Grooming: Modified Independent    Bathing: Modified independent    Upper Body Dressing: Modified independent    Lower Body Dressing: Modified independent    Toileting: Modified independent      ADL Intervention:  Cognitive Retraining  Safety/Judgement: Fall prevention      Pain:  Pain level pre-treatment: 0/10   Pain level post-treatment: 0/10   Pain Intervention(s): Medication (see MAR);  Rest, Ice, Repositioning  Response to intervention: Nurse notified, See doc flow    Activity Tolerance:   Good    Please refer to the flowsheet for vital signs taken during this treatment. After treatment:   [x]  Patient left in no apparent distress sitting up in chair  []  Patient left in no apparent distress in bed  [x]  Call bell left within reach  []  Nursing notified  []  Caregiver present  []  Bed alarm activated    COMMUNICATION/EDUCATION:   [x]      Role of Occupational Therapy in the acute care setting  [x]      Home safety education was provided and the patient/caregiver indicated understanding. [x]      Patient/family have participated as able and agree with findings and recommendations. []      Patient is unable to participate in plan of care at this time. Thank you for this referral.  Tanika Mcdaniel MS, OTR/L  Time Calculation: 19 mins      Eval Complexity: History: LOW Complexity : Brief history review ; Examination: LOW Complexity : 1-3 performance deficits relating to physical, cognitive , or psychosocial skils that result in activity limitations and / or participation restrictions ;    Decision Making:LOW Complexity : No comorbidities that affect functional and no verbal or physical assistance needed to complete eval tasks

## 2020-10-13 NOTE — DISCHARGE SUMMARY
[address] [place] [address] Dr Bekah Smith [facility], [address]     DISCHARGE SUMMARY    Name: Timothy Arteaga MRN: [id number]   Age / Sex: 66 y.o. / male CSN: [id number]   YOB: 1945 Length of Stay: 0 days   Admit Date: 10/11/2020 Discharge Date:        PRIMARY CARE PHYSICIAN: Isatu Cutler MD      DISCHARGE DIAGNOSES:  -Syncope     -History of bilateral carotid artery stenosis 80-90%, on CT angio left side noted to be somewhat more stenotic w/ plans for carotid vascularization by [facility].      HISTORY OF:     -Severe symptomatic aortic stenosis status post T a VR of aortic valve on 5/24/2020 on aspirin and Plavix. Echo 6/22/20  EF 65%, PAP 22mmHg.  -CAD, status post CABG in 1997.  -Sinus bradycardia  -Hypertension  -Dyslipidemia  -Type 2 diabetes mellitus  -Dementia  -Thrombocytopenia    CONSULTS CALLED: Cardiology      PROCEDURES DONE: None      COURSE IN THE HOSPITAL: This is a 78-year-old male who presented to the ED after he had a syncopal episode. Patient was evaluated and was admitted. There was concern for orthostasis. Patient received IV fluids. Patient was monitored on telemetry. Cardiac biomarkers were trended. Cardiology was consulted. Patient symptoms improved. Patient was mobilized. Cardiology cleared the patient for discharge. Patient was no longer orthostatic. Case management was involved. Patient was discharged home with home health care. MEDICATIONS ON DISCHARGE:    Current Discharge Medication List      START taking these medications    Details   cyanocobalamin 1,000 mcg tablet Take 1 Tab by mouth daily. Qty: 30 Tab, Refills: 0      !! OTHER Graded Compression Stockings b/l LE- use as directed  Qty: 1 Each, Refills: 0      !! OTHER Check CBC, CMP, Mg in 4 days, results to PCP immediately, Diagnosis- HTN  Qty: 1 Each, Refills: 0       !! - Potential duplicate medications found. Please discuss with provider.       CONTINUE these medications which have CHANGED    Details   metoprolol tartrate (LOPRESSOR) 25 mg tablet Take 0.5 Tabs by mouth two (2) times a day. Qty: 30 Tab, Refills: 0         CONTINUE these medications which have NOT CHANGED    Details   !! glucose blood VI test strips (Accu-Chek Tracee Plus test strp) strip       !! Accu-Chek Tracee Plus test strp strip       clopidogreL (PLAVIX) 75 mg tab Take 75 mg by mouth daily. donepeziL (ARICEPT) 10 mg tablet       lancets (Accu-Chek Softclix Lancets) misc       aspirin delayed-release 81 mg tablet Take 81 mg by mouth daily. ezetimibe-simvastatin (VYTORIN 10/40) 10-40 mg per tablet Take 1 Tab by mouth nightly. timolol maleate 0.5 % DrpD ophthalmic solution Administer 1 Drop to both eyes daily. bimatoprost (LUMIGAN) 0.03 % ophthalmic drops Administer 1 Drop to both eyes every evening. FIBER CHOICE PO Take  by mouth. MULTIVITAMIN W/IRON, MINERALS (MULTIVITAMIN AND MINERALS PO) Take  by mouth. OTHER,NON-FORMULARY, Indications: Move Free joint care      nitroglycerin (NITROSTAT) 0.4 mg SL tablet 0.4 mg by SubLINGual route every five (5) minutes as needed. !! - Potential duplicate medications found. Please discuss with provider.       STOP taking these medications       SAW PALMETTO PO Comments:   Reason for Stopping:         glimepiride (AMARYL) 4 mg tablet Comments:   Reason for Stopping:         metoprolol succinate (TOPROL-XL) 50 mg XL tablet Comments:   Reason for Stopping:         metFORMIN (GLUCOPHAGE) 500 mg tablet Comments:   Reason for Stopping:         ramipril (ALTACE) 5 mg capsule Comments:   Reason for Stopping:         sitaGLIPtin (JANUVIA) 100 mg tablet Comments:   Reason for Stopping:                 DISCHARGE VITAL SIGNS:  Visit Vitals  /67 (BP 1 Location: Left arm, BP Patient Position: Supine)   Pulse 60   Temp 97.4 °F (36.3 °C)   Resp 19   Ht 5' 5\" (1.651 m)   Wt 74.4 kg (164 lb 0.4 oz)   SpO2 97%   BMI 27.29 kg/m² CONDITION ON DISCHARGE: Stable. DISPOSITION: Home      FOLLOW-UP RECOMMENDATIONS:   Follow-up Information     Follow up With Specialties Details Why Contact Info    Teresa Thomson MD Internal Medicine   [address]  [facility] [address]  [phone]            OTHER INSTRUCTIONS:        TIME SPENT ON DISCHARGE ACTIVITIES: More than 35 minutes. Dragon medical dictation software was used for portions of this report. Unintended errors may occur.       Signed:  Marguerite Evans MD      10/13/2020

## 2020-10-13 NOTE — DISCHARGE INSTRUCTIONS
Patient armband removed and shredded    DISCHARGE SUMMARY from Nurse    PATIENT INSTRUCTIONS:    What to do at Home:  Recommended activity: Activity as tolerated,     If you experience any of the following symptoms dizziness, increased weakness, spinning of environment, please follow up with nearest emergency room or primary care physician. *  Please give a list of your current medications to your Primary Care Provider. *  Please update this list whenever your medications are discontinued, doses are      changed, or new medications (including over-the-counter products) are added. *  Please carry medication information at all times in case of emergency situations. These are general instructions for a healthy lifestyle:    No smoking/ No tobacco products/ Avoid exposure to second hand smoke  Surgeon General's Warning:  Quitting smoking now greatly reduces serious risk to your health. Obesity, smoking, and sedentary lifestyle greatly increases your risk for illness    A healthy diet, regular physical exercise & weight monitoring are important for maintaining a healthy lifestyle    You may be retaining fluid if you have a history of heart failure or if you experience any of the following symptoms:  Weight gain of 3 pounds or more overnight or 5 pounds in a week, increased swelling in our hands or feet or shortness of breath while lying flat in bed. Please call your doctor as soon as you notice any of these symptoms; do not wait until your next office visit. The discharge information has been reviewed with the patient. The patient verbalized understanding. Discharge medications reviewed with the patient and appropriate educational materials and side effects teaching were provided.   ___________________________________________________________________________________________________________________________________

## 2020-10-13 NOTE — PROGRESS NOTES
Problem: Falls - Risk of  Goal: *Absence of Falls  Description: Document Leanne Payan Fall Risk and appropriate interventions in the flowsheet. Outcome: Progressing Towards Goal  Note: Fall Risk Interventions:            Medication Interventions: Assess postural VS orthostatic hypotension, Patient to call before getting OOB, Teach patient to arise slowly         History of Falls Interventions: Bed/chair exit alarm, Room close to nurse's station, Investigate reason for fall, Door open when patient unattended         Problem: Patient Education: Go to Patient Education Activity  Goal: Patient/Family Education  Outcome: Progressing Towards Goal     Problem: Syncope  Goal: *Absence of injury  Outcome: Progressing Towards Goal  Goal: Decrease or eliminate episodes of syncope  Outcome: Progressing Towards Goal     Problem: Patient Education: Go to Patient Education Activity  Goal: Patient/Family Education  Outcome: Progressing Towards Goal     Problem: Diabetes Self-Management  Goal: *Disease process and treatment process  Description: Define diabetes and identify own type of diabetes; list 3 options for treating diabetes. Outcome: Progressing Towards Goal  Goal: *Incorporating nutritional management into lifestyle  Description: Describe effect of type, amount and timing of food on blood glucose; list 3 methods for planning meals. Outcome: Progressing Towards Goal  Goal: *Incorporating physical activity into lifestyle  Description: State effect of exercise on blood glucose levels. Outcome: Progressing Towards Goal  Goal: *Developing strategies to promote health/change behavior  Description: Define the ABC's of diabetes; identify appropriate screenings, schedule and personal plan for screenings. Outcome: Progressing Towards Goal  Goal: *Using medications safely  Description: State effect of diabetes medications on diabetes; name diabetes medication taking, action and side effects.   Outcome: Progressing Towards Goal  Goal: *Monitoring blood glucose, interpreting and using results  Description: Identify recommended blood glucose targets  and personal targets. Outcome: Progressing Towards Goal  Goal: *Prevention, detection, treatment of acute complications  Description: List symptoms of hyper- and hypoglycemia; describe how to treat low blood sugar and actions for lowering  high blood glucose level. Outcome: Progressing Towards Goal  Goal: *Prevention, detection and treatment of chronic complications  Description: Define the natural course of diabetes and describe the relationship of blood glucose levels to long term complications of diabetes.   Outcome: Progressing Towards Goal  Goal: *Developing strategies to address psychosocial issues  Description: Describe feelings about living with diabetes; identify support needed and support network  Outcome: Progressing Towards Goal  Goal: *Insulin pump training  Outcome: Progressing Towards Goal  Goal: *Sick day guidelines  Outcome: Progressing Towards Goal  Goal: *Patient Specific Goal (EDIT GOAL, INSERT TEXT)  Outcome: Progressing Towards Goal     Problem: Patient Education: Go to Patient Education Activity  Goal: Patient/Family Education  Outcome: Progressing Towards Goal

## 2020-10-13 NOTE — ROUTINE PROCESS
Bedside shift change report given to [address] [place],[address] (oncoming nurse) by Gustavo Maxwell RN (offgoing nurse). Report included the following information SBAR, Procedure Summary, MAR and Recent Results.

## 2020-10-13 NOTE — PROGRESS NOTES
[facility]      CARDIOLOGY PROGRESS NOTE  RECS:      1. Syncope- possible due to orthostatic hypotension. Today BP stable no orthostastic changes seen today. BP on 10/12/20 152/78, 128/53, 137/53. Reviewed Telemetry no significant arrhthymias seen. Recent echo shows normal LV function with normally functioning prosthetic valve and mild paravalvular leak. 2. Hx of severe symptomatic aortic stenosis- s/p TAVR of aortic valve with implantation of Evolut Pro 26 mm stent valve by Dr. Bret Estrada on 5/20/20 SNG- on asa and Plavix  Echo 6/22/20  EF 65%, PAP 22mmHg  3. Conary artery disease- cath 2008 and unable to stent LAD. cath 3/13/20 -- patent LEWIS to LAD, patent SVG to LCx, occluded SVG to RCA, LM heavily diseased, LAD occluded and distal to the second touchdown of LIMA graft there is 70 to 80% lesion, RCA heavily diseased with occluded graft to the distal segment - patient was managed medically   4. Hx of  CABG 1997  5. Sinus bradycardia-continue  BB per parameters   6. Hypertension-continue home medications. Very labile BP   7. Hyperlipidemia- continue statin   8. Diabetes mellitus- medications per medical team   9. Carotid artery disease --s/p left endaderectomy 9/20. 10. Dementia- confused and disoriented   11. Hx of Thrombocytopenia - chronic           Cardiac cath 3/20   1. Severe aortic valve stenosis, status post successful aortic   valvuloplasty. 2. Severe native coronary artery disease. 3. Patent left internal mammary artery to left anterior descending with   distal      left anterior descending disease that could be treated with   percutaneous      coronary intervention. 4. Saphenous vein graft open to left circumflex. 5. Occluded saphenous vein graft to right coronary artery, but adequately      revascularized vessel such that intervention to the right is not   needed. RECOMMENDATIONS:  1. We will arrange CTA and remainder of cardiac workup.   2. We will have patient follow up in [facility].. 3. I would favor TAVR and PCI of LAD one week prior and will be happy to     Echo 10/12/20    Result status: Final result    · LV: Estimated LVEF is 55 - 60%. Visually measured ejection fraction. Normal cavity size, wall thickness and systolic function (ejection fraction normal). Wall motion: normal.  · AV: Prosthetic aortic valve. Aortic valve area is 1.8 cm2. There is a 26 mm Evolut Pro + bioprosthetic aortic valve. Prosthesis is normal. Prosthetic valve function is insufficient. There is paravalvular leaking. ASSESSMENT:  Hospital Problems  Never Reviewed          Codes Class Noted POA    Syncope ICD-10-CM: R55  ICD-9-CM: 780.2  10/11/2020 Unknown                SUBJECTIVE:  No CP  No SOB    OBJECTIVE:    VS:   Visit Vitals  /70 (BP 1 Location: Left arm, BP Patient Position: At rest)   Pulse 66   Temp 98.7 °F (37.1 °C)   Resp 20   Ht 5' 5\" (1.651 m)   Wt 74.4 kg (164 lb 0.4 oz)   SpO2 93%   BMI 27.29 kg/m²       No intake or output data in the 24 hours ending 10/13/20 1109  TELE: normal sinus rhythm    General: alert, well developed, pleasant and in no apparent distress  HENT: Normocephalic, atraumatic. Normal external eye.   Neck :  no bruit, no JVD, JVD difficult to assess due to obesity  Cardiac:  regular rate and rhythm, S1, S2 normal, no click, no rub  Lungs: clear to auscultation bilaterally  Abdomen: Soft, nontender, no masses  Extremities:  No c/c/e, peripheral pulses present      Labs: Results:       Chemistry Recent Labs     10/13/20  0925 10/12/20  0353 10/11/20  1035   * 92 72*    139 139   K 3.9 3.8 3.4*    106 105   CO2 27 28 26   BUN 16 18 18   CREA 1.37* 1.62* 1.70*   CA 8.4* 8.8 8.8   AGAP 7 5 8   BUCR 12 11* 11*   AP  --  70  --    TP  --  6.8  --    ALB  --  3.2*  --    GLOB  --  3.6  --    AGRAT  --  0.9  --       CBC w/Diff Recent Labs     10/13/20  0925 10/12/20  0353 10/11/20  1035   WBC 4.3* 4.8 4.8   RBC 3.85* 3.93* 4.35*   HGB 11.3* 11.4* 12.7*   HCT 33.6* 34.0* 37.3   * 105* 120*   GRANS  --  63 79*   LYMPH  --  23 11*   EOS  --  0 0      Cardiac Enzymes Recent Labs     10/11/20  1035   CPK 86   CKND1 2.1      Coagulation No results for input(s): PTP, INR, APTT, INREXT in the last 72 hours. Lipid Panel No results found for: CHOL, CHOLPOCT, CHOLX, CHLST, CHOLV, 506852, HDL, HDLP, LDL, LDLC, DLDLP, 421480, VLDLC, VLDL, TGLX, TRIGL, TRIGP, TGLPOCT, CHHD, CHHDX   BNP No results for input(s): BNPP in the last 72 hours.    Liver Enzymes Recent Labs     10/12/20  0353   TP 6.8   ALB 3.2*   AP 70      Thyroid Studies Lab Results   Component Value Date/Time    TSH 2.09 10/12/2020 03:53 AM              [address] NP-C Jannie:[phone]

## 2020-10-13 NOTE — ROUTINE PROCESS
As part of the discharge instructions, medications already given today were discussed with the patient/son and wife The next dose due of all ordered meds was highlighted as part of the medication discharge instructions. Discussed with the patient/son and wife the importance of taking medications as directed, as well as the side effects and adverse reactions to medications ordered. Acknowledged understanding. Discharged to car via wheelchair.

## 2020-10-13 NOTE — PROGRESS NOTES
Discharge order noted for today. Pt has been accepted to [facility] agency. Met with patient and spouse over the phone and are agreeable to the transition plan today. Transport has been arranged through son and spouse. Patient's discharge summary and home health  orders have been forwarded to [facility] home health  agency via [facility] Updated bedside RN,  to the transition plan.   Discharge information has been documented on the AVS.       Duane Zambrano RN BSN  Care Manager  [phone]

## 2020-10-13 NOTE — PROGRESS NOTES
Patient sitting in bed in NAD, awake, follows commands, wishes to go home asap. D/w Cardiology APC. Home today. Patient is no longer orthostatic. Patient denies any lightheadedness or Chest pain. Patient seen ambulating freely in room. Home today with Darcy Alcantara. Discussed discharge plans and medications with patient. D/w RN. Tried to call wife twice [phone]- kept ringing, not able to leave message.  D/w

## 2020-10-13 NOTE — PROGRESS NOTES
Problem: Mobility Impaired (Adult and Pediatric)  Goal: *Acute Goals and Plan of Care (Insert Text)  Description: Pt able to ambulate 200 feet in hallways with SBA and no AD without difficulty. Pt not in need of acute PT at this time. Recommend d/c home with assist from wife if needed. Pt not in need of any equipment. PLOF: Pt lives in a [facility] with 3 steps to enter B HR with his wife. Pt was independent with all mobility PTA and does not use an AD to ambulate. Outcome: Resolved/Met     PHYSICAL THERAPY EVALUATION AND DISCHARGE    Patient: Shaqulile Martinez (77 y.o. male)  Date: 10/13/2020  Primary Diagnosis: Syncope [R55]  Syncope [R55]      ASSESSMENT :  Based on the objective data described below, the patient presents with decreased endurance. Rn cleared for PT to see patient. Pt found supine in bed, on room air, willing to work with PT. Pt did not eat much breakfast and reports he is not a breakfast person. Pt had no reports of lightheadedness/dizziness throughout entire PT session. BP supine: 144/84. Pt performed supine-sit with SBA, /78. Pt stood, /81. Pt requested to use restroom and ambulated SBA with no AD to use restroom independently. Once finished, patient ambulated 200 feet in hallways with SBA and no AD, no reports of instability/LOB or dizziness. Pt returned back to room to sit up in recliner, B Le's elevated, call bell nearby, no new questions or concerns. At this time, pt not in need of acute PT. Recommend d/c home with assist from wife if needed. Patient does not require further skilled intervention at this level of care. PLAN :  Recommendations and Planned Interventions:   No formal PT needs identified at this time. Discharge Recommendations: Home with assist from wife if needed  Further Equipment Recommendations for Discharge: N/A     SUBJECTIVE:   Patient stated i'm feeling pretty good.     OBJECTIVE DATA SUMMARY:     Past Medical History:   Diagnosis Date CAD (coronary artery disease)     Aortic Stenosis    Carotid artery stenosis     Diabetes (HCC)     Glaucoma     High cholesterol     Hypertension     Ill-defined condition     Bilateral Carotid Artery Stenosis     Past Surgical History:   Procedure Laterality Date    CARDIAC SURG PROCEDURE UNLIST      bypass     Barriers to Learning/Limitations: None  Compensate with: N/A  Home Situation:   Home Situation  Home Environment: Private residence  # Steps to Enter: 3  One/Two Story Residence: One story  Living Alone: No  Support Systems: Family member(s), Spouse/Significant Other/Partner  Patient Expects to be Discharged to[de-identified] Private residence  Current DME Used/Available at Home: None  Critical Behavior:  Neurologic State: Alert  Orientation Level: Oriented to person  Cognition: Follows commands  Safety/Judgement: Fall prevention  Psychosocial  Patient Behaviors: Calm; Cooperative    Strength:    Strength: Within functional limits       Tone & Sensation:   Tone: Normal    Sensation: Intact    Range Of Motion:  AROM: Within functional limits    Functional Mobility:  Bed Mobility:     Supine to Sit: Stand-by assistance  Sit to Supine: Stand-by assistance  Scooting: Stand-by assistance  Transfers:  Sit to Stand: Modified independent  Stand to Sit: Modified independent    Balance:   Sitting: Intact  Standing: Without support  Standing - Static: Good  Standing - Dynamic : Good    Ambulation/Gait Training:  Distance (ft): 200 Feet (ft)  Assistive Device: Other (comment)(none)  Ambulation - Level of Assistance: Stand-by assistance        Gait Abnormalities: Decreased step clearance        Base of Support: Narrowed     Speed/Lidia: Pace decreased (<100 feet/min)  Step Length: Right shortened;Left shortened    Pain:  Pain level pre-treatment: 0/10   Pain level post-treatment: 0/10  Pain Intervention(s): Medication (see MAR);  Rest, Repositioning   Response to intervention: Nurse notified, See doc flow    Activity Tolerance:   Pt tolerated all mobility well today, no lightheadedness/dizziness  Please refer to the flowsheet for vital signs taken during this treatment. After treatment:   [x]         Patient left in no apparent distress sitting up in chair  []         Patient left in no apparent distress in bed  [x]         Call bell left within reach  [x]         Nursing notified  []         Caregiver present  []         Bed alarm activated  []         SCDs applied    COMMUNICATION/EDUCATION:   [x]         Role of Physical Therapy in the acute care setting. [x]         Fall prevention education was provided and the patient/caregiver indicated understanding. [x]         Patient/family have participated as able in goal setting and plan of care. []         Patient/family agree to work toward stated goals and plan of care. []         Patient understands intent and goals of therapy, but is neutral about his/her participation. []         Patient is unable to participate in goal setting/plan of care: ongoing with therapy staff.  []         Other:     Thank you for this referral.  Ruy Medellin   Time Calculation: 25 mins      Eval Complexity: History: LOW Complexity : Zero comorbidities / personal factors that will impact the outcome / POCExam:LOW Complexity : 1-2 Standardized tests and measures addressing body structure, function, activity limitation and / or participation in recreation  Presentation: LOW Complexity : Stable, uncomplicated  Clinical Decision Making:Low Complexity    Overall Complexity:LOW

## 2020-10-14 ENCOUNTER — HOME CARE VISIT (OUTPATIENT)
Dept: SCHEDULING | Facility: HOME HEALTH | Age: 75
End: 2020-10-14
Payer: MEDICARE

## 2020-10-14 VITALS
OXYGEN SATURATION: 99 % | HEART RATE: 82 BPM | DIASTOLIC BLOOD PRESSURE: 82 MMHG | SYSTOLIC BLOOD PRESSURE: 140 MMHG | RESPIRATION RATE: 16 BRPM | TEMPERATURE: 98.4 F

## 2020-10-14 PROCEDURE — G0299 HHS/HOSPICE OF RN EA 15 MIN: HCPCS

## 2020-10-14 PROCEDURE — 400013 HH SOC

## 2020-10-14 PROCEDURE — 3331090002 HH PPS REVENUE DEBIT

## 2020-10-14 PROCEDURE — 3331090001 HH PPS REVENUE CREDIT

## 2020-10-15 ENCOUNTER — HOME CARE VISIT (OUTPATIENT)
Dept: HOME HEALTH SERVICES | Facility: HOME HEALTH | Age: 75
End: 2020-10-15
Payer: MEDICARE

## 2020-10-15 PROCEDURE — 3331090001 HH PPS REVENUE CREDIT

## 2020-10-15 PROCEDURE — 3331090002 HH PPS REVENUE DEBIT

## 2020-10-16 ENCOUNTER — HOME CARE VISIT (OUTPATIENT)
Dept: SCHEDULING | Facility: HOME HEALTH | Age: 75
End: 2020-10-16
Payer: MEDICARE

## 2020-10-16 PROCEDURE — 3331090002 HH PPS REVENUE DEBIT

## 2020-10-16 PROCEDURE — G0155 HHCP-SVS OF CSW,EA 15 MIN: HCPCS

## 2020-10-16 PROCEDURE — 3331090001 HH PPS REVENUE CREDIT

## 2020-10-16 PROCEDURE — G0300 HHS/HOSPICE OF LPN EA 15 MIN: HCPCS

## 2020-10-17 PROCEDURE — 3331090001 HH PPS REVENUE CREDIT

## 2020-10-17 PROCEDURE — 3331090002 HH PPS REVENUE DEBIT

## 2020-10-18 PROCEDURE — 3331090002 HH PPS REVENUE DEBIT

## 2020-10-18 PROCEDURE — 3331090001 HH PPS REVENUE CREDIT

## 2020-10-19 ENCOUNTER — HOME CARE VISIT (OUTPATIENT)
Dept: SCHEDULING | Facility: HOME HEALTH | Age: 75
End: 2020-10-19
Payer: MEDICARE

## 2020-10-19 VITALS — HEART RATE: 58 BPM | OXYGEN SATURATION: 98 % | SYSTOLIC BLOOD PRESSURE: 130 MMHG | DIASTOLIC BLOOD PRESSURE: 70 MMHG

## 2020-10-19 PROCEDURE — G0300 HHS/HOSPICE OF LPN EA 15 MIN: HCPCS

## 2020-10-19 PROCEDURE — G0151 HHCP-SERV OF PT,EA 15 MIN: HCPCS

## 2020-10-19 PROCEDURE — 3331090001 HH PPS REVENUE CREDIT

## 2020-10-19 PROCEDURE — 3331090002 HH PPS REVENUE DEBIT

## 2020-10-20 PROCEDURE — 3331090001 HH PPS REVENUE CREDIT

## 2020-10-20 PROCEDURE — 3331090002 HH PPS REVENUE DEBIT

## 2020-10-21 ENCOUNTER — HOME CARE VISIT (OUTPATIENT)
Dept: SCHEDULING | Facility: HOME HEALTH | Age: 75
End: 2020-10-21
Payer: MEDICARE

## 2020-10-21 PROCEDURE — 3331090002 HH PPS REVENUE DEBIT

## 2020-10-21 PROCEDURE — 3331090001 HH PPS REVENUE CREDIT

## 2020-10-21 PROCEDURE — G0300 HHS/HOSPICE OF LPN EA 15 MIN: HCPCS

## 2020-10-22 PROCEDURE — 3331090001 HH PPS REVENUE CREDIT

## 2020-10-22 PROCEDURE — 3331090002 HH PPS REVENUE DEBIT

## 2020-10-23 ENCOUNTER — APPOINTMENT (OUTPATIENT)
Dept: GENERAL RADIOLOGY | Age: 75
End: 2020-10-23
Attending: EMERGENCY MEDICINE
Payer: MEDICARE

## 2020-10-23 ENCOUNTER — HOSPITAL ENCOUNTER (EMERGENCY)
Age: 75
Discharge: HOME | End: 2020-10-23
Attending: EMERGENCY MEDICINE
Payer: MEDICARE

## 2020-10-23 VITALS
RESPIRATION RATE: 17 BRPM | BODY MASS INDEX: 27.83 KG/M2 | HEART RATE: 64 BPM | WEIGHT: 163 LBS | TEMPERATURE: 98.4 F | DIASTOLIC BLOOD PRESSURE: 69 MMHG | HEIGHT: 64 IN | SYSTOLIC BLOOD PRESSURE: 174 MMHG | OXYGEN SATURATION: 97 %

## 2020-10-23 DIAGNOSIS — R53.1 WEAKNESS: Primary | ICD-10-CM

## 2020-10-23 LAB
ALBUMIN SERPL-MCNC: 3.5 G/DL (ref 3.4–5)
ALBUMIN/GLOB SERPL: 0.8 {RATIO} (ref 0.8–1.7)
ALP SERPL-CCNC: 91 U/L (ref 45–117)
ALT SERPL-CCNC: 43 U/L (ref 16–61)
ANION GAP SERPL CALC-SCNC: 7 MMOL/L (ref 3–18)
APPEARANCE UR: CLEAR
AST SERPL-CCNC: 48 U/L (ref 10–38)
ATRIAL RATE: 60 BPM
BACTERIA URNS QL MICRO: NEGATIVE /HPF
BASOPHILS # BLD: 0 K/UL (ref 0–0.1)
BASOPHILS NFR BLD: 0 % (ref 0–2)
BILIRUB SERPL-MCNC: 0.7 MG/DL (ref 0.2–1)
BILIRUB UR QL: NEGATIVE
BUN SERPL-MCNC: 9 MG/DL (ref 7–18)
BUN/CREAT SERPL: 7 (ref 12–20)
CALCIUM SERPL-MCNC: 9.3 MG/DL (ref 8.5–10.1)
CALCULATED P AXIS, ECG09: 66 DEGREES
CALCULATED R AXIS, ECG10: -34 DEGREES
CALCULATED T AXIS, ECG11: 73 DEGREES
CHLORIDE SERPL-SCNC: 108 MMOL/L (ref 100–111)
CK MB CFR SERPL CALC: 2.1 % (ref 0–4)
CK MB SERPL-MCNC: 1.3 NG/ML (ref 5–25)
CK SERPL-CCNC: 62 U/L (ref 39–308)
CO2 SERPL-SCNC: 26 MMOL/L (ref 21–32)
COLOR UR: YELLOW
CREAT SERPL-MCNC: 1.36 MG/DL (ref 0.6–1.3)
DIAGNOSIS, 93000: NORMAL
DIFFERENTIAL METHOD BLD: ABNORMAL
EOSINOPHIL # BLD: 0.2 K/UL (ref 0–0.4)
EOSINOPHIL NFR BLD: 3 % (ref 0–5)
EPITH CASTS URNS QL MICRO: NEGATIVE /LPF (ref 0–5)
ERYTHROCYTE [DISTWIDTH] IN BLOOD BY AUTOMATED COUNT: 12.8 % (ref 11.6–14.5)
GLOBULIN SER CALC-MCNC: 4.2 G/DL (ref 2–4)
GLUCOSE SERPL-MCNC: 200 MG/DL (ref 74–99)
GLUCOSE UR STRIP.AUTO-MCNC: 500 MG/DL
HCT VFR BLD AUTO: 35 % (ref 36–48)
HGB BLD-MCNC: 12 G/DL (ref 13–16)
HGB UR QL STRIP: NEGATIVE
KETONES UR QL STRIP.AUTO: NEGATIVE MG/DL
LEUKOCYTE ESTERASE UR QL STRIP.AUTO: NEGATIVE
LYMPHOCYTES # BLD: 1 K/UL (ref 0.9–3.6)
LYMPHOCYTES NFR BLD: 20 % (ref 21–52)
MCH RBC QN AUTO: 29.2 PG (ref 24–34)
MCHC RBC AUTO-ENTMCNC: 34.3 G/DL (ref 31–37)
MCV RBC AUTO: 85.2 FL (ref 74–97)
MONOCYTES # BLD: 0.4 K/UL (ref 0.05–1.2)
MONOCYTES NFR BLD: 8 % (ref 3–10)
NEUTS SEG # BLD: 3.3 K/UL (ref 1.8–8)
NEUTS SEG NFR BLD: 69 % (ref 40–73)
NITRITE UR QL STRIP.AUTO: NEGATIVE
P-R INTERVAL, ECG05: 252 MS
PH UR STRIP: 5 [PH] (ref 5–8)
PLATELET # BLD AUTO: 138 K/UL (ref 135–420)
PMV BLD AUTO: 9.9 FL (ref 9.2–11.8)
POTASSIUM SERPL-SCNC: 3.9 MMOL/L (ref 3.5–5.5)
PROT SERPL-MCNC: 7.7 G/DL (ref 6.4–8.2)
PROT UR STRIP-MCNC: ABNORMAL MG/DL
Q-T INTERVAL, ECG07: 472 MS
QRS DURATION, ECG06: 88 MS
QTC CALCULATION (BEZET), ECG08: 472 MS
RBC # BLD AUTO: 4.11 M/UL (ref 4.7–5.5)
RBC #/AREA URNS HPF: NEGATIVE /HPF (ref 0–5)
SODIUM SERPL-SCNC: 141 MMOL/L (ref 136–145)
SP GR UR REFRACTOMETRY: 1.01 (ref 1–1.03)
TROPONIN I SERPL-MCNC: <0.02 NG/ML (ref 0–0.04)
UROBILINOGEN UR QL STRIP.AUTO: 0.2 EU/DL (ref 0.2–1)
VENTRICULAR RATE, ECG03: 60 BPM
WBC # BLD AUTO: 4.9 K/UL (ref 4.6–13.2)
WBC URNS QL MICRO: NORMAL /HPF (ref 0–4)

## 2020-10-23 PROCEDURE — 99284 EMERGENCY DEPT VISIT MOD MDM: CPT

## 2020-10-23 PROCEDURE — 85025 COMPLETE CBC W/AUTO DIFF WBC: CPT

## 2020-10-23 PROCEDURE — 3331090001 HH PPS REVENUE CREDIT

## 2020-10-23 PROCEDURE — 93005 ELECTROCARDIOGRAM TRACING: CPT

## 2020-10-23 PROCEDURE — 80053 COMPREHEN METABOLIC PANEL: CPT

## 2020-10-23 PROCEDURE — 81001 URINALYSIS AUTO W/SCOPE: CPT

## 2020-10-23 PROCEDURE — 71045 X-RAY EXAM CHEST 1 VIEW: CPT

## 2020-10-23 PROCEDURE — 3331090002 HH PPS REVENUE DEBIT

## 2020-10-23 PROCEDURE — 82550 ASSAY OF CK (CPK): CPT

## 2020-10-23 NOTE — PROGRESS NOTES
Met briefly with patient, he was  Initially frustrated because he could not reach his wife and son. I returned later and he was feeling better because he had talked with them. MICHELLE Bedoya.  Kallie Bachelor

## 2020-10-23 NOTE — ED TRIAGE NOTES
Per medic: \"Last night patient states weakness and dizziness on standing and walking. Today patient was suppose to get blood work done however patient was to weak to go. \"

## 2020-10-23 NOTE — DISCHARGE INSTRUCTIONS
Patient Education        Weakness: Care Instructions  Your Care Instructions     Weakness is a lack of physical or muscle strength. You may feel that you need to make extra effort to move your arms, legs, or other muscles. Generalized weakness means that you feel weak in most areas of your body. Another type of weakness may affect just one muscle or group of muscles. You may feel weak and tired after you have done too much activity, such as taking an extra-long hike. This is not a serious problem. It often goes away on its own. Feeling weak can also be caused by medical conditions like thyroid problems, depression, or a virus. Sometimes the cause can be serious. Your doctor may want to do more tests to try to find the cause of the weakness. The doctor has checked you carefully, but problems can develop later. If you notice any problems or new symptoms, get medical treatment right away. Follow-up care is a key part of your treatment and safety. Be sure to make and go to all appointments, and call your doctor if you are having problems. It's also a good idea to know your test results and keep a list of the medicines you take. How can you care for yourself at home? · Rest when you feel tired. · Be safe with medicines. If your doctor prescribed medicine, take it exactly as prescribed. Call your doctor if you think you are having a problem with your medicine. You will get more details on the specific medicines your doctor prescribes. · Do not skip meals. Eating a balanced diet may increase your energy level. · Get some physical activity every day, but do not get too tired. When should you call for help? Call your doctor now or seek immediate medical care if:    · You have new or worse weakness.     · You are dizzy or lightheaded, or you feel like you may faint. Watch closely for changes in your health, and be sure to contact your doctor if:    · You do not get better as expected.    Where can you learn more?  Go to http://www.gray.com/  Enter V492 in the search box to learn more about \"Weakness: Care Instructions. \"  Current as of: June 26, 2019               Content Version: 12.6  © 7456-0297 [employer]. Care instructions adapted under license by [employer] (which disclaims liability or warranty for this information). If you have questions about a medical condition or this instruction, always ask your healthcare professional. [place] [address] any warranty or liability for your use of this information.

## 2020-10-23 NOTE — ED PROVIDER NOTES
EMERGENCY DEPARTMENT HISTORY AND PHYSICAL EXAM    9:04 AM      Date: 10/23/2020  Patient Name: Otto Plummer    History of Presenting Illness     Chief Complaint   Patient presents with    Fatigue         History Provided By: Patient    Additional History (Context): Otto Plummer is a 66 y.o. male with diabetes, hypertension and hyperlipidemia who presents with weakness since last night. Patient states he is having some type of procedure on Thursday has been feeling weak so came to be checked. Patient denies chest pain, fever, cough, shortness of breath, nausea, vomiting or diarrhea. Patient states his appetite has decreased some. He also has poor urine flow. He denies any tobacco, alcohol or recreational drug use. PCP: Abimbola Tyler MD      Current Outpatient Medications   Medication Sig Dispense Refill    metoprolol tartrate (LOPRESSOR) 25 mg tablet Take 0.5 Tabs by mouth two (2) times a day. 30 Tab 0    cyanocobalamin 1,000 mcg tablet Take 1 Tab by mouth daily. 30 Tab 0    OTHER Graded Compression Stockings b/l LE- use as directed 1 Each 0    OTHER Check CBC, CMP, Mg in 4 days, results to PCP immediately, Diagnosis- HTN 1 Each 0    OTHER No driving until cleared by Cardiologist to drive 1 Each 0    glucose blood VI test strips (Accu-Chek Tracee Plus test strp) strip       Accu-Chek Tracee Plus test strp strip       clopidogreL (PLAVIX) 75 mg tab Take 75 mg by mouth daily.  donepeziL (ARICEPT) 10 mg tablet Take 10 mg by mouth nightly.  lancets (Accu-Chek Softclix Lancets) misc       aspirin delayed-release 81 mg tablet Take 81 mg by mouth daily.  timolol maleate 0.5 % DrpD ophthalmic solution Administer 1 Drop to both eyes daily.  ezetimibe-simvastatin (VYTORIN 10/40) 10-40 mg per tablet Take 1 Tab by mouth nightly.  bimatoprost (LUMIGAN) 0.03 % ophthalmic drops Administer 1 Drop to both eyes every evening.       FIBER CHOICE PO Take 1 Tab by mouth daily.  MULTIVITAMIN W/IRON, MINERALS (MULTIVITAMIN AND MINERALS PO) Take 1 Tab by mouth daily. Beatriz Mead, Indications: Move Free joint care      nitroglycerin (NITROSTAT) 0.4 mg SL tablet 0.4 mg by SubLINGual route every five (5) minutes as needed for Chest Pain. then calll 911. Past History     Past Medical History:  Past Medical History:   Diagnosis Date    CAD (coronary artery disease)     Aortic Stenosis    Carotid artery stenosis     Diabetes (HCC)     Glaucoma     High cholesterol     Hypertension     Ill-defined condition     Bilateral Carotid Artery Stenosis       Past Surgical History:  Past Surgical History:   Procedure Laterality Date    CARDIAC SURG PROCEDURE UNLIST      bypass       Family History:  Family History   Problem Relation Age of Onset    Diabetes Other        Social History:  Social History     Tobacco Use    Smoking status: Never Smoker    Smokeless tobacco: Never Used   Substance Use Topics    Alcohol use: No    Drug use: No       Allergies:  No Known Allergies      Review of Systems       Review of Systems   Constitutional: Positive for appetite change and fatigue. Negative for chills, diaphoresis and fever. HENT: Negative. Negative for congestion, rhinorrhea and sore throat. Eyes: Negative. Negative for pain, discharge and redness. Respiratory: Negative. Negative for cough, chest tightness, shortness of breath and wheezing. Cardiovascular: Negative. Negative for chest pain. Gastrointestinal: Negative. Negative for abdominal pain, constipation, diarrhea, nausea and vomiting. Genitourinary: Negative. Negative for dysuria, flank pain, frequency, hematuria and urgency. Musculoskeletal: Negative. Negative for back pain and neck pain. Skin: Negative. Negative for rash. Neurological: Positive for weakness. Negative for syncope, numbness and headaches. Psychiatric/Behavioral: Negative.     All other systems reviewed and are negative. Physical Exam     Visit Vitals  BP (!) 142/76   Pulse 60   Temp 98.4 °F (36.9 °C)   Resp 15   Ht 5' 4\" (1.626 m)   Wt 73.9 kg (163 lb)   SpO2 98%   BMI 27.98 kg/m²         Physical Exam  Vitals signs and nursing note reviewed. Constitutional:       General: He is not in acute distress. Appearance: Normal appearance. He is well-developed. He is not ill-appearing, toxic-appearing or diaphoretic. HENT:      Head: Normocephalic and atraumatic. Mouth/Throat:      Pharynx: No oropharyngeal exudate. Eyes:      General: No scleral icterus. Conjunctiva/sclera: Conjunctivae normal.      Pupils: Pupils are equal, round, and reactive to light. Neck:      Musculoskeletal: Normal range of motion and neck supple. Thyroid: No thyromegaly. Vascular: No hepatojugular reflux or JVD. Trachea: No tracheal deviation. Cardiovascular:      Rate and Rhythm: Normal rate and regular rhythm. Pulses: Normal pulses. Radial pulses are 2+ on the right side and 2+ on the left side. Dorsalis pedis pulses are 2+ on the right side and 2+ on the left side. Heart sounds: Normal heart sounds, S1 normal and S2 normal. No murmur. No gallop. No S3 or S4 sounds. Pulmonary:      Effort: Pulmonary effort is normal. No respiratory distress. Breath sounds: Normal breath sounds. No decreased breath sounds, wheezing, rhonchi or rales. Abdominal:      General: Bowel sounds are normal. There is no distension. Palpations: Abdomen is soft. Abdomen is not rigid. There is no mass. Tenderness: There is no abdominal tenderness. There is no guarding or rebound. Negative signs include Ryan's sign and McBurney's sign. Musculoskeletal: Normal range of motion. Comments: Strength 4/5 throughout. Lymphadenopathy:      Head:      Right side of head: No submental, submandibular, preauricular or occipital adenopathy.       Left side of head: No submental, submandibular, preauricular or occipital adenopathy. Cervical: No cervical adenopathy. Upper Body:      Right upper body: No supraclavicular adenopathy. Left upper body: No supraclavicular adenopathy. Skin:     General: Skin is warm and dry. Findings: No rash. Neurological:      Mental Status: He is alert. He is not disoriented. GCS: GCS eye subscore is 4. GCS verbal subscore is 5. GCS motor subscore is 6. Cranial Nerves: No cranial nerve deficit. Sensory: No sensory deficit. Coordination: Coordination normal.      Gait: Gait normal.      Deep Tendon Reflexes: Reflexes are normal and symmetric. Comments: Grossly intact   Psychiatric:         Speech: Speech normal.         Behavior: Behavior normal.         Thought Content: Thought content normal.         Judgment: Judgment normal.           Diagnostic Study Results     Labs -  Recent Results (from the past 12 hour(s))   CBC WITH AUTOMATED DIFF    Collection Time: 10/23/20  9:00 AM   Result Value Ref Range    WBC 4.9 4.6 - 13.2 K/uL    RBC 4.11 (L) 4.70 - 5.50 M/uL    HGB 12.0 (L) 13.0 - 16.0 g/dL    HCT 35.0 (L) 36.0 - 48.0 %    MCV 85.2 74.0 - 97.0 FL    MCH 29.2 24.0 - 34.0 PG    MCHC 34.3 31.0 - 37.0 g/dL    RDW 12.8 11.6 - 14.5 %    PLATELET 914 353 - 084 K/uL    MPV 9.9 9.2 - 11.8 FL    NEUTROPHILS 69 40 - 73 %    LYMPHOCYTES 20 (L) 21 - 52 %    MONOCYTES 8 3 - 10 %    EOSINOPHILS 3 0 - 5 %    BASOPHILS 0 0 - 2 %    ABS. NEUTROPHILS 3.3 1.8 - 8.0 K/UL    ABS. LYMPHOCYTES 1.0 0.9 - 3.6 K/UL    ABS. MONOCYTES 0.4 0.05 - 1.2 K/UL    ABS. EOSINOPHILS 0.2 0.0 - 0.4 K/UL    ABS.  BASOPHILS 0.0 0.0 - 0.1 K/UL    DF AUTOMATED     METABOLIC PANEL, COMPREHENSIVE    Collection Time: 10/23/20  9:00 AM   Result Value Ref Range    Sodium 141 136 - 145 mmol/L    Potassium 3.9 3.5 - 5.5 mmol/L    Chloride 108 100 - 111 mmol/L    CO2 26 21 - 32 mmol/L    Anion gap 7 3.0 - 18 mmol/L    Glucose 200 (H) 74 - 99 mg/dL    BUN 9 7.0 - 18 MG/DL    Creatinine 1.36 (H) 0.6 - 1.3 MG/DL    BUN/Creatinine ratio 7 (L) 12 - 20      GFR est AA >60 >60 ml/min/1.73m2    GFR est non-AA 51 (L) >60 ml/min/1.73m2    Calcium 9.3 8.5 - 10.1 MG/DL    Bilirubin, total 0.7 0.2 - 1.0 MG/DL    ALT (SGPT) 43 16 - 61 U/L    AST (SGOT) 48 (H) 10 - 38 U/L    Alk.  phosphatase 91 45 - 117 U/L    Protein, total 7.7 6.4 - 8.2 g/dL    Albumin 3.5 3.4 - 5.0 g/dL    Globulin 4.2 (H) 2.0 - 4.0 g/dL    A-G Ratio 0.8 0.8 - 1.7     CARDIAC PANEL,(CK, CKMB & TROPONIN)    Collection Time: 10/23/20  9:00 AM   Result Value Ref Range    CK - MB 1.3 <3.6 ng/ml    CK-MB Index 2.1 0.0 - 4.0 %    CK 62 39 - 308 U/L    Troponin-I, QT <0.02 0.0 - 0.045 NG/ML   EKG, 12 LEAD, INITIAL    Collection Time: 10/23/20 10:01 AM   Result Value Ref Range    Ventricular Rate 60 BPM    Atrial Rate 60 BPM    P-R Interval 252 ms    QRS Duration 88 ms    Q-T Interval 472 ms    QTC Calculation (Bezet) 472 ms    Calculated P Axis 66 degrees    Calculated R Axis -34 degrees    Calculated T Axis 73 degrees    Diagnosis       Sinus rhythm with 1st degree AV block  Left axis deviation  Voltage criteria for left ventricular hypertrophy  Nonspecific T wave abnormality  Prolonged QT  Abnormal ECG  When compared with ECG of 11-OCT-2020 10:23,  No significant change was found     URINALYSIS W/ RFLX MICROSCOPIC    Collection Time: 10/23/20 10:35 AM   Result Value Ref Range    Color YELLOW      Appearance CLEAR      Specific gravity 1.014 1.005 - 1.030      pH (UA) 5.0 5.0 - 8.0      Protein TRACE (A) NEG mg/dL    Glucose 500 (A) NEG mg/dL    Ketone Negative NEG mg/dL    Bilirubin Negative NEG      Blood Negative NEG      Urobilinogen 0.2 0.2 - 1.0 EU/dL    Nitrites Negative NEG      Leukocyte Esterase Negative NEG     URINE MICROSCOPIC ONLY    Collection Time: 10/23/20 10:35 AM   Result Value Ref Range    WBC 0 to 2 0 - 4 /hpf    RBC Negative 0 - 5 /hpf    Epithelial cells Negative 0 - 5 /lpf    Bacteria Negative NEG /hpf       Radiologic Studies -   XR CHEST PORT   Final Result   IMPRESSION:      Stable examination without evidence of acute cardiopulmonary abnormality. Medical Decision Making   Provider Notes (Medical Decision Making):  MDM  Number of Diagnoses or Management Options  Diagnosis management comments: Shortness of breath etiologies include chronic obstructive pulmonary disease (COPD), acute asthma exacerbation, congestive heart failure, pneumonia, acute bronchitis, pulmonary embolism, upper respiratory infection, cardiac event to include acute coronary syndrome, acute myocardial infarction or a combination of the above (ex URI on top of COPD thus causing respiratory distress). I am the first provider for this patient. I reviewed the vital signs, available nursing notes, past medical history, past surgical history, family history and social history. Vital Signs-Reviewed the patient's vital signs. Records Reviewed: Nursing Notes (Time of Review: 9:04 AM)    ED Course: Progress Notes, Reevaluation, and Consults:    Labs essentially normal.  UA negative. Chest X-Ray showed No acute process. EKG showed sinus rhythm at a rate of 60 bpm. With no ST elevations or depression and non specific T wave changes. 11:04 AM 10/23/2020    Discussed plan with patient at this time do not feel he meets admission criteria. Diagnosis       I have reassessed the patient. Patient is feeling well. Patient was discharged in stable condition. Patient is to return to emergency department if any new or worsening condition. Clinical Impression:   1.  Weakness        Disposition: Discharged home     Follow-up Information     Follow up With Specialties Details Why Contact Info    Sen Jade MD Internal Medicine In 2 days  [address] [place] [address] [phone]               Attestation        Provider Attestation:     I personally performed the services described in the documentation, reviewed the documentation and it accurately and completely records my words and actions utilizing the [address] [place] October 23, 2020 at 11:17 AM - Becky Connors DO    Disclaimer. It is dictated using utilizing voice recognition software. Unfortunately this leads to occasional typographical errors. I apologize in advance if the situation occurs. If questions arise please do not hesitate to contact me or call our department.

## 2020-10-23 NOTE — PROGRESS NOTES
conducted an initial consultation and Spiritual Assessment for Brayan Munroe, who is a 66 y. o.,male. Patient's Primary Language is: [place]. According to the patient's EMR Worship Affiliation is: No Jewish. The reason the Patient came to the hospital is:   Patient Active Problem List    Diagnosis Date Noted    Syncope 10/11/2020        The  provided the following Interventions:  Initiated a relationship of care and support. Listened empathically. Provided chaplaincy education. Provided information about Spiritual Care Services. Offered prayer and assurance of continued prayers on patient's behalf. Chart reviewed. The following outcomes where achieved:  Patient expressed gratitude for 's visit. Assessment:  Patient does not have any Roman Catholic/cultural needs that will affect patient's preferences in health care. There are no spiritual or Roman Catholic issues which require intervention at this time. Plan:  Chaplains will continue to follow and will provide pastoral care on an as needed/requested basis.  recommends bedside caregivers page  on duty if patient shows signs of acute spiritual or emotional distress.     [facility] [address]   [phone]

## 2020-10-24 PROCEDURE — 3331090002 HH PPS REVENUE DEBIT

## 2020-10-24 PROCEDURE — 3331090001 HH PPS REVENUE CREDIT

## 2020-10-25 PROCEDURE — 3331090002 HH PPS REVENUE DEBIT

## 2020-10-25 PROCEDURE — 3331090001 HH PPS REVENUE CREDIT

## 2020-10-26 ENCOUNTER — HOME CARE VISIT (OUTPATIENT)
Dept: HOME HEALTH SERVICES | Facility: HOME HEALTH | Age: 75
End: 2020-10-26
Payer: MEDICARE

## 2020-10-26 VITALS
RESPIRATION RATE: 18 BRPM | TEMPERATURE: 98.7 F | TEMPERATURE: 95.7 F | OXYGEN SATURATION: 98 % | SYSTOLIC BLOOD PRESSURE: 136 MMHG | WEIGHT: 161 LBS | DIASTOLIC BLOOD PRESSURE: 56 MMHG | HEART RATE: 66 BPM | SYSTOLIC BLOOD PRESSURE: 132 MMHG | DIASTOLIC BLOOD PRESSURE: 90 MMHG | RESPIRATION RATE: 18 BRPM | BODY MASS INDEX: 26.79 KG/M2 | OXYGEN SATURATION: 96 % | HEART RATE: 67 BPM

## 2020-10-26 PROCEDURE — 3331090002 HH PPS REVENUE DEBIT

## 2020-10-26 PROCEDURE — 3331090001 HH PPS REVENUE CREDIT

## 2020-10-27 ENCOUNTER — HOME CARE VISIT (OUTPATIENT)
Dept: HOME HEALTH SERVICES | Facility: HOME HEALTH | Age: 75
End: 2020-10-27
Payer: MEDICARE

## 2020-10-27 PROCEDURE — 3331090002 HH PPS REVENUE DEBIT

## 2020-10-27 PROCEDURE — 3331090001 HH PPS REVENUE CREDIT

## 2020-10-28 ENCOUNTER — HOME CARE VISIT (OUTPATIENT)
Dept: SCHEDULING | Facility: HOME HEALTH | Age: 75
End: 2020-10-28
Payer: MEDICARE

## 2020-10-28 PROCEDURE — 3331090001 HH PPS REVENUE CREDIT

## 2020-10-28 PROCEDURE — G0300 HHS/HOSPICE OF LPN EA 15 MIN: HCPCS

## 2020-10-28 PROCEDURE — G0152 HHCP-SERV OF OT,EA 15 MIN: HCPCS

## 2020-10-28 PROCEDURE — 3331090002 HH PPS REVENUE DEBIT

## 2020-10-29 VITALS
HEART RATE: 62 BPM | OXYGEN SATURATION: 8 % | DIASTOLIC BLOOD PRESSURE: 67 MMHG | TEMPERATURE: 97.7 F | RESPIRATION RATE: 18 BRPM | SYSTOLIC BLOOD PRESSURE: 124 MMHG

## 2020-10-29 PROCEDURE — 3331090001 HH PPS REVENUE CREDIT

## 2020-10-29 PROCEDURE — 3331090002 HH PPS REVENUE DEBIT

## 2020-10-30 PROCEDURE — 3331090002 HH PPS REVENUE DEBIT

## 2020-10-30 PROCEDURE — 3331090001 HH PPS REVENUE CREDIT

## 2020-10-31 PROCEDURE — 3331090002 HH PPS REVENUE DEBIT

## 2020-10-31 PROCEDURE — 3331090001 HH PPS REVENUE CREDIT

## 2020-11-01 PROCEDURE — 3331090001 HH PPS REVENUE CREDIT

## 2020-11-01 PROCEDURE — 3331090002 HH PPS REVENUE DEBIT

## 2020-11-02 VITALS — RESPIRATION RATE: 16 BRPM | TEMPERATURE: 97.7 F

## 2020-11-02 PROCEDURE — 3331090001 HH PPS REVENUE CREDIT

## 2020-11-02 PROCEDURE — 3331090002 HH PPS REVENUE DEBIT

## 2020-11-03 PROCEDURE — 3331090001 HH PPS REVENUE CREDIT

## 2020-11-03 PROCEDURE — 3331090002 HH PPS REVENUE DEBIT

## 2020-11-04 ENCOUNTER — HOME CARE VISIT (OUTPATIENT)
Dept: SCHEDULING | Facility: HOME HEALTH | Age: 75
End: 2020-11-04
Payer: MEDICARE

## 2020-11-04 VITALS
DIASTOLIC BLOOD PRESSURE: 73 MMHG | RESPIRATION RATE: 18 BRPM | OXYGEN SATURATION: 98 % | HEART RATE: 66 BPM | TEMPERATURE: 97.7 F | SYSTOLIC BLOOD PRESSURE: 120 MMHG

## 2020-11-04 PROCEDURE — G0300 HHS/HOSPICE OF LPN EA 15 MIN: HCPCS

## 2020-11-04 PROCEDURE — 3331090001 HH PPS REVENUE CREDIT

## 2020-11-04 PROCEDURE — 3331090002 HH PPS REVENUE DEBIT

## 2020-11-05 PROCEDURE — 3331090001 HH PPS REVENUE CREDIT

## 2020-11-05 PROCEDURE — 3331090002 HH PPS REVENUE DEBIT

## 2020-11-06 ENCOUNTER — HOME CARE VISIT (OUTPATIENT)
Dept: SCHEDULING | Facility: HOME HEALTH | Age: 75
End: 2020-11-06
Payer: MEDICARE

## 2020-11-06 PROCEDURE — 3331090001 HH PPS REVENUE CREDIT

## 2020-11-06 PROCEDURE — 3331090002 HH PPS REVENUE DEBIT

## 2020-11-06 PROCEDURE — G0300 HHS/HOSPICE OF LPN EA 15 MIN: HCPCS

## 2020-11-07 PROCEDURE — 3331090001 HH PPS REVENUE CREDIT

## 2020-11-07 PROCEDURE — 3331090002 HH PPS REVENUE DEBIT

## 2020-11-08 PROCEDURE — 3331090001 HH PPS REVENUE CREDIT

## 2020-11-08 PROCEDURE — 3331090002 HH PPS REVENUE DEBIT

## 2020-11-09 ENCOUNTER — HOME CARE VISIT (OUTPATIENT)
Dept: HOME HEALTH SERVICES | Facility: HOME HEALTH | Age: 75
End: 2020-11-09
Payer: MEDICARE

## 2020-11-09 PROCEDURE — 3331090001 HH PPS REVENUE CREDIT

## 2020-11-09 PROCEDURE — 3331090002 HH PPS REVENUE DEBIT

## 2020-11-10 PROCEDURE — 3331090001 HH PPS REVENUE CREDIT

## 2020-11-10 PROCEDURE — 3331090002 HH PPS REVENUE DEBIT

## 2020-11-11 PROCEDURE — 3331090001 HH PPS REVENUE CREDIT

## 2020-11-11 PROCEDURE — 3331090002 HH PPS REVENUE DEBIT

## 2020-11-12 PROCEDURE — 3331090001 HH PPS REVENUE CREDIT

## 2020-11-12 PROCEDURE — 3331090002 HH PPS REVENUE DEBIT

## 2020-11-13 VITALS
OXYGEN SATURATION: 98 % | TEMPERATURE: 97.1 F | RESPIRATION RATE: 18 BRPM | DIASTOLIC BLOOD PRESSURE: 77 MMHG | HEART RATE: 67 BPM | SYSTOLIC BLOOD PRESSURE: 132 MMHG

## 2020-11-13 PROCEDURE — 3331090001 HH PPS REVENUE CREDIT

## 2020-11-13 PROCEDURE — 3331090002 HH PPS REVENUE DEBIT

## 2020-11-14 PROCEDURE — 3331090002 HH PPS REVENUE DEBIT

## 2020-11-14 PROCEDURE — 3331090001 HH PPS REVENUE CREDIT

## 2020-11-15 PROCEDURE — 3331090001 HH PPS REVENUE CREDIT

## 2020-11-15 PROCEDURE — 3331090002 HH PPS REVENUE DEBIT

## 2020-11-16 PROCEDURE — 3331090002 HH PPS REVENUE DEBIT

## 2020-11-16 PROCEDURE — 3331090001 HH PPS REVENUE CREDIT

## 2020-11-17 ENCOUNTER — HOME CARE VISIT (OUTPATIENT)
Dept: HOME HEALTH SERVICES | Facility: HOME HEALTH | Age: 75
End: 2020-11-17
Payer: MEDICARE

## 2020-11-17 PROCEDURE — G0300 HHS/HOSPICE OF LPN EA 15 MIN: HCPCS

## 2020-11-17 PROCEDURE — 400013 HH SOC

## 2020-11-17 PROCEDURE — 3331090001 HH PPS REVENUE CREDIT

## 2020-11-17 PROCEDURE — 3331090002 HH PPS REVENUE DEBIT

## 2020-11-18 PROCEDURE — 3331090002 HH PPS REVENUE DEBIT

## 2020-11-18 PROCEDURE — 3331090001 HH PPS REVENUE CREDIT

## 2020-11-19 PROCEDURE — 3331090001 HH PPS REVENUE CREDIT

## 2020-11-19 PROCEDURE — 3331090002 HH PPS REVENUE DEBIT

## 2020-11-20 PROCEDURE — 3331090002 HH PPS REVENUE DEBIT

## 2020-11-20 PROCEDURE — 3331090001 HH PPS REVENUE CREDIT

## 2020-11-21 PROCEDURE — 3331090001 HH PPS REVENUE CREDIT

## 2020-11-21 PROCEDURE — 3331090002 HH PPS REVENUE DEBIT

## 2020-11-22 PROCEDURE — 3331090002 HH PPS REVENUE DEBIT

## 2020-11-22 PROCEDURE — 3331090001 HH PPS REVENUE CREDIT

## 2020-11-23 VITALS
OXYGEN SATURATION: 98 % | HEART RATE: 65 BPM | DIASTOLIC BLOOD PRESSURE: 62 MMHG | SYSTOLIC BLOOD PRESSURE: 117 MMHG | RESPIRATION RATE: 16 BRPM | TEMPERATURE: 97.7 F

## 2020-11-23 PROCEDURE — 3331090001 HH PPS REVENUE CREDIT

## 2020-11-23 PROCEDURE — 3331090002 HH PPS REVENUE DEBIT

## 2020-11-24 PROCEDURE — 3331090002 HH PPS REVENUE DEBIT

## 2020-11-24 PROCEDURE — 3331090001 HH PPS REVENUE CREDIT

## 2020-11-25 PROCEDURE — 3331090002 HH PPS REVENUE DEBIT

## 2020-11-25 PROCEDURE — 3331090001 HH PPS REVENUE CREDIT

## 2020-11-26 PROCEDURE — 3331090001 HH PPS REVENUE CREDIT

## 2020-11-26 PROCEDURE — 3331090002 HH PPS REVENUE DEBIT

## 2020-11-27 PROCEDURE — 3331090001 HH PPS REVENUE CREDIT

## 2020-11-27 PROCEDURE — 3331090002 HH PPS REVENUE DEBIT

## 2020-11-28 ENCOUNTER — HOME CARE VISIT (OUTPATIENT)
Dept: SCHEDULING | Facility: HOME HEALTH | Age: 75
End: 2020-11-28
Payer: MEDICARE

## 2020-11-28 ENCOUNTER — HOME CARE VISIT (OUTPATIENT)
Dept: HOME HEALTH SERVICES | Facility: HOME HEALTH | Age: 75
End: 2020-11-28
Payer: MEDICARE

## 2020-11-28 VITALS
OXYGEN SATURATION: 97 % | TEMPERATURE: 97.6 F | SYSTOLIC BLOOD PRESSURE: 138 MMHG | RESPIRATION RATE: 18 BRPM | HEART RATE: 58 BPM | DIASTOLIC BLOOD PRESSURE: 82 MMHG

## 2020-11-28 PROCEDURE — 3331090001 HH PPS REVENUE CREDIT

## 2020-11-28 PROCEDURE — G0299 HHS/HOSPICE OF RN EA 15 MIN: HCPCS

## 2020-11-28 PROCEDURE — 3331090002 HH PPS REVENUE DEBIT

## 2020-11-29 PROCEDURE — 3331090001 HH PPS REVENUE CREDIT

## 2020-11-29 PROCEDURE — 3331090002 HH PPS REVENUE DEBIT

## 2022-01-26 ENCOUNTER — HOSPITAL ENCOUNTER (EMERGENCY)
Age: 77
Discharge: HOME | End: 2022-01-26
Attending: EMERGENCY MEDICINE
Payer: MEDICARE

## 2022-01-26 ENCOUNTER — APPOINTMENT (OUTPATIENT)
Dept: GENERAL RADIOLOGY | Age: 77
End: 2022-01-26
Attending: EMERGENCY MEDICINE
Payer: MEDICARE

## 2022-01-26 VITALS
HEART RATE: 63 BPM | OXYGEN SATURATION: 99 % | DIASTOLIC BLOOD PRESSURE: 78 MMHG | HEIGHT: 64 IN | RESPIRATION RATE: 16 BRPM | BODY MASS INDEX: 29.02 KG/M2 | TEMPERATURE: 99.7 F | SYSTOLIC BLOOD PRESSURE: 136 MMHG | WEIGHT: 170 LBS

## 2022-01-26 DIAGNOSIS — R09.81 SINUS CONGESTION: ICD-10-CM

## 2022-01-26 DIAGNOSIS — Z20.822 SUSPECTED COVID-19 VIRUS INFECTION: Primary | ICD-10-CM

## 2022-01-26 DIAGNOSIS — R05.9 COUGH: ICD-10-CM

## 2022-01-26 DIAGNOSIS — R53.83 OTHER FATIGUE: ICD-10-CM

## 2022-01-26 DIAGNOSIS — F03.90 DEMENTIA WITHOUT BEHAVIORAL DISTURBANCE, UNSPECIFIED DEMENTIA TYPE: ICD-10-CM

## 2022-01-26 LAB
ANION GAP SERPL CALC-SCNC: 3 MMOL/L (ref 3–18)
APPEARANCE UR: CLEAR
BACTERIA URNS QL MICRO: NEGATIVE /HPF
BASOPHILS # BLD: 0 K/UL (ref 0–0.1)
BASOPHILS NFR BLD: 0 % (ref 0–2)
BILIRUB UR QL: NEGATIVE
BUN SERPL-MCNC: 14 MG/DL (ref 7–18)
BUN/CREAT SERPL: 9 (ref 12–20)
CALCIUM SERPL-MCNC: 8.9 MG/DL (ref 8.5–10.1)
CHLORIDE SERPL-SCNC: 105 MMOL/L (ref 100–111)
CO2 SERPL-SCNC: 30 MMOL/L (ref 21–32)
COLOR UR: YELLOW
CREAT SERPL-MCNC: 1.59 MG/DL (ref 0.6–1.3)
DIFFERENTIAL METHOD BLD: ABNORMAL
EOSINOPHIL # BLD: 0.1 K/UL (ref 0–0.4)
EOSINOPHIL NFR BLD: 2 % (ref 0–5)
EPITH CASTS URNS QL MICRO: NORMAL /LPF (ref 0–5)
ERYTHROCYTE [DISTWIDTH] IN BLOOD BY AUTOMATED COUNT: 13.2 % (ref 11.6–14.5)
GLUCOSE SERPL-MCNC: 128 MG/DL (ref 74–99)
GLUCOSE UR STRIP.AUTO-MCNC: >1000 MG/DL
HCT VFR BLD AUTO: 40.8 % (ref 36–48)
HGB BLD-MCNC: 13.4 G/DL (ref 13–16)
HGB UR QL STRIP: NEGATIVE
IMM GRANULOCYTES # BLD AUTO: 0 K/UL (ref 0–0.04)
IMM GRANULOCYTES NFR BLD AUTO: 0 % (ref 0–0.5)
KETONES UR QL STRIP.AUTO: NEGATIVE MG/DL
LEUKOCYTE ESTERASE UR QL STRIP.AUTO: NEGATIVE
LYMPHOCYTES # BLD: 1.2 K/UL (ref 0.9–3.6)
LYMPHOCYTES NFR BLD: 15 % (ref 21–52)
MCH RBC QN AUTO: 29.3 PG (ref 24–34)
MCHC RBC AUTO-ENTMCNC: 32.8 G/DL (ref 31–37)
MCV RBC AUTO: 89.3 FL (ref 78–100)
MONOCYTES # BLD: 0.9 K/UL (ref 0.05–1.2)
MONOCYTES NFR BLD: 11 % (ref 3–10)
NEUTS SEG # BLD: 5.9 K/UL (ref 1.8–8)
NEUTS SEG NFR BLD: 73 % (ref 40–73)
NITRITE UR QL STRIP.AUTO: NEGATIVE
NRBC # BLD: 0 K/UL (ref 0–0.01)
NRBC BLD-RTO: 0 PER 100 WBC
PH UR STRIP: 5 [PH] (ref 5–8)
PLATELET # BLD AUTO: 157 K/UL (ref 135–420)
PMV BLD AUTO: 10.3 FL (ref 9.2–11.8)
POTASSIUM SERPL-SCNC: 3.7 MMOL/L (ref 3.5–5.5)
PROT UR STRIP-MCNC: ABNORMAL MG/DL
RBC # BLD AUTO: 4.57 M/UL (ref 4.35–5.65)
RBC #/AREA URNS HPF: NEGATIVE /HPF (ref 0–5)
SARS-COV-2, COV2: NORMAL
SODIUM SERPL-SCNC: 138 MMOL/L (ref 136–145)
SP GR UR REFRACTOMETRY: 1.02 (ref 1–1.03)
UROBILINOGEN UR QL STRIP.AUTO: 0.2 EU/DL (ref 0.2–1)
WBC # BLD AUTO: 8.1 K/UL (ref 4.6–13.2)
WBC URNS QL MICRO: NORMAL /HPF (ref 0–4)

## 2022-01-26 PROCEDURE — 99283 EMERGENCY DEPT VISIT LOW MDM: CPT

## 2022-01-26 PROCEDURE — 85025 COMPLETE CBC W/AUTO DIFF WBC: CPT

## 2022-01-26 PROCEDURE — 71045 X-RAY EXAM CHEST 1 VIEW: CPT

## 2022-01-26 PROCEDURE — 81001 URINALYSIS AUTO W/SCOPE: CPT

## 2022-01-26 PROCEDURE — U0003 INFECTIOUS AGENT DETECTION BY NUCLEIC ACID (DNA OR RNA); SEVERE ACUTE RESPIRATORY SYNDROME CORONAVIRUS 2 (SARS-COV-2) (CORONAVIRUS DISEASE [COVID-19]), AMPLIFIED PROBE TECHNIQUE, MAKING USE OF HIGH THROUGHPUT TECHNOLOGIES AS DESCRIBED BY CMS-2020-01-R: HCPCS

## 2022-01-26 PROCEDURE — 80048 BASIC METABOLIC PNL TOTAL CA: CPT

## 2022-01-26 RX ORDER — BENZONATATE 100 MG/1
100 CAPSULE ORAL
Qty: 30 CAPSULE | Refills: 0 | Status: SHIPPED | OUTPATIENT
Start: 2022-01-26 | End: 2022-02-02

## 2022-01-26 NOTE — ED PROVIDER NOTES
EMERGENCY DEPARTMENT HISTORY AND PHYSICAL EXAM    Date: (Not on file)  Patient Name: Jace Olivia    History of Presenting Illness     Chief Complaint   Patient presents with    Fatigue    Sore Throat    Nasal Congestion    Cough         History Provided By: Wife, son    Chief Complaint: fatigue, cough, congestion, sore throat  Duration: yesterday  Timing:    Location:   Quality: aching  Severity: mild  Modifying Factors:   Associated Symptoms: none       Additional History (Context): Jace Olivia is a 64 y.o. male with a history of dementia, CABG< aortic valve replacement, DMII presents for evaluation of fatigue, cough, sore throat and congestion that started yesterday. Pt son is here with him,  Raj Cash him in after wife called him concerned when he went to take a nap. Has been vaccinated for COVID, no sick contacts. Denies fever, productive cough, headache, body aches, weakness, syncope, fall, abdominal pain, nausea/vomiting, chest pain or SOB. PCP: Candice Puckett MD    Current Outpatient Medications   Medication Sig Dispense Refill    empagliflozin (Jardiance) 25 mg tablet Take 25 mg by mouth daily.  benzonatate (Tessalon Perles) 100 mg capsule Take 1 Capsule by mouth three (3) times daily as needed for Cough for up to 7 days. 30 Capsule 0    metFORMIN (GLUCOPHAGE) 500 mg tablet Take 500 mg by mouth daily (with breakfast).  glimepiride (AMARYL) 4 mg tablet Take 4 mg by mouth every morning.  metoprolol tartrate (LOPRESSOR) 25 mg tablet Take 0.5 Tabs by mouth two (2) times a day. 30 Tab 0    clopidogreL (PLAVIX) 75 mg tab Take 75 mg by mouth daily.  donepeziL (ARICEPT) 10 mg tablet Take 10 mg by mouth nightly.  aspirin delayed-release 81 mg tablet Take 81 mg by mouth daily.  ezetimibe-simvastatin (VYTORIN 10/40) 10-40 mg per tablet Take 1 Tab by mouth nightly.       MULTIVITAMIN W/IRON, MINERALS (MULTIVITAMIN AND MINERALS PO) Take 1 Tab by mouth daily.  Zinc Mth/Copper/Saw Palm/Gnsg (PROSTATE HEALTH FORMULA PO) Take  by mouth.  glucose blood VI test strips (Accu-Chek Tracee Plus test strp) strip       Accu-Chek Tracee Plus test strp strip       lancets (Accu-Chek Softclix Lancets) misc       FIBER CHOICE PO Take 1 Tab by mouth daily. Yael Perkins, Indications: [facility]         Past History     Past Medical History:  Past Medical History:   Diagnosis Date    CAD (coronary artery disease)     Aortic Stenosis    Carotid artery stenosis     Dementia ([facility] [address]     Diabetes ([facility] [address]     Glaucoma     High cholesterol     Hypertension     Ill-defined condition     Bilateral Carotid Artery Stenosis       Past Surgical History:  Past Surgical History:   Procedure Laterality Date    [place] CARDIAC SURG PROCEDURE UNLIST      bypass       Family History:  Family History   Problem Relation Age of Onset    Diabetes Other        Social History:  Social History     Tobacco Use    Smoking status: Never Smoker    Smokeless tobacco: Never Used   Substance Use Topics    Alcohol use: No    Drug use: No       Allergies:  No Known Allergies      Review of Systems   Review of Systems   Constitutional: Positive for chills and fatigue. Negative for fever. HENT: Positive for congestion and sore throat. Negative for ear pain, postnasal drip, rhinorrhea and trouble swallowing. Respiratory: Positive for cough. Negative for apnea, shortness of breath and wheezing. Cardiovascular: Negative for chest pain and palpitations. Gastrointestinal: Negative for abdominal pain and nausea. Genitourinary: Negative for dysuria and flank pain. Musculoskeletal: Negative for arthralgias, back pain, joint swelling and myalgias. Skin: Negative for color change and wound. Neurological: Negative for dizziness, syncope, weakness, numbness and headaches.      All Other Systems Negative  Physical Exam     Vitals:    01/26/22 1720   BP: 136/78   Pulse: 63   Resp: 16   Temp: 99.7 °F (37.6 °C)   SpO2: 99%   Weight: 77.1 kg (170 lb)   Height: 5' 4\" (1.626 m)     Physical Exam  Vitals reviewed. Constitutional:       General: He is not in acute distress. Appearance: He is well-developed and normal weight. He is not ill-appearing or toxic-appearing. Comments: Pt sitting in wheelchair, appears well. Very pleasant gentleman    HENT:      Head: Normocephalic and atraumatic. Right Ear: Tympanic membrane and ear canal normal.      Left Ear: Tympanic membrane normal.      Nose: No congestion or rhinorrhea. Mouth/Throat:      Mouth: Mucous membranes are moist. No oral lesions. Pharynx: Oropharynx is clear. Uvula midline. No pharyngeal swelling, oropharyngeal exudate, posterior oropharyngeal erythema or uvula swelling. Tonsils: No tonsillar exudate or tonsillar abscesses. Eyes:      Conjunctiva/sclera: Conjunctivae normal.   Cardiovascular:      Rate and Rhythm: Normal rate and regular rhythm. Heart sounds: Normal heart sounds. Pulmonary:      Effort: Pulmonary effort is normal.      Breath sounds: Normal breath sounds. Musculoskeletal:      Cervical back: Normal range of motion. Skin:     General: Skin is warm and dry. Neurological:      General: No focal deficit present. Mental Status: He is alert. Cranial Nerves: Cranial nerves are intact. No cranial nerve deficit, dysarthria or facial asymmetry. Sensory: Sensation is intact. Motor: Motor function is intact. No weakness, tremor, atrophy, abnormal muscle tone, seizure activity or pronator drift. Coordination: Coordination normal. Finger-Nose-Finger Test and Heel to [facility] Test normal. Rapid alternating movements normal.      Gait: Gait (pt has a cane but does not need to use it. no abnormality with gait) normal.      Comments: Pt knows where he is, unsure of the president and what day of the week.  Per son this is not abnormal with his dementia   Psychiatric: Mood and Affect: Mood normal.         Behavior: Behavior normal.           Diagnostic Study Results     Labs -     Recent Results (from the past 12 hour(s))   SARS-COV-2    Collection Time: 01/26/22  5:30 PM   Result Value Ref Range    SARS-CoV-2 Nasopharyngeal     CBC WITH AUTOMATED DIFF    Collection Time: 01/26/22  5:35 PM   Result Value Ref Range    WBC 8.1 4.6 - 13.2 K/uL    RBC 4.57 4.35 - 5.65 M/uL    HGB 13.4 13.0 - 16.0 g/dL    HCT 40.8 36.0 - 48.0 %    MCV 89.3 78.0 - 100.0 FL    MCH 29.3 24.0 - 34.0 PG    MCHC 32.8 31.0 - 37.0 g/dL    RDW 13.2 11.6 - 14.5 %    PLATELET 212 170 - 972 K/uL    MPV 10.3 9.2 - 11.8 FL    NRBC 0.0 0  WBC    ABSOLUTE NRBC 0.00 0.00 - 0.01 K/uL    NEUTROPHILS 73 40 - 73 %    LYMPHOCYTES 15 (L) 21 - 52 %    MONOCYTES 11 (H) 3 - 10 %    EOSINOPHILS 2 0 - 5 %    BASOPHILS 0 0 - 2 %    IMMATURE GRANULOCYTES 0 0.0 - 0.5 %    ABS. NEUTROPHILS 5.9 1.8 - 8.0 K/UL    ABS. LYMPHOCYTES 1.2 0.9 - 3.6 K/UL    ABS. MONOCYTES 0.9 0.05 - 1.2 K/UL    ABS. EOSINOPHILS 0.1 0.0 - 0.4 K/UL    ABS. BASOPHILS 0.0 0.0 - 0.1 K/UL    ABS. IMM.  GRANS. 0.0 0.00 - 0.04 K/UL    DF AUTOMATED     METABOLIC PANEL, BASIC    Collection Time: 01/26/22  5:35 PM   Result Value Ref Range    Sodium 138 136 - 145 mmol/L    Potassium 3.7 3.5 - 5.5 mmol/L    Chloride 105 100 - 111 mmol/L    CO2 30 21 - 32 mmol/L    Anion gap 3 3.0 - 18 mmol/L    Glucose 128 (H) 74 - 99 mg/dL    BUN 14 7.0 - 18 MG/DL    Creatinine 1.59 (H) 0.6 - 1.3 MG/DL    BUN/Creatinine ratio 9 (L) 12 - 20      GFR est AA 51 (L) >60 ml/min/1.73m2    GFR est non-AA 42 (L) >60 ml/min/1.73m2    Calcium 8.9 8.5 - 10.1 MG/DL   URINALYSIS W/ RFLX MICROSCOPIC    Collection Time: 01/26/22  5:45 PM   Result Value Ref Range    Color YELLOW      Appearance CLEAR      Specific gravity 1.021 1.005 - 1.030      pH (UA) 5.0 5.0 - 8.0      Protein TRACE (A) NEG mg/dL    Glucose >1,000 (A) NEG mg/dL    Ketone Negative NEG mg/dL    Bilirubin Negative NEG Blood Negative NEG      Urobilinogen 0.2 0.2 - 1.0 EU/dL    Nitrites Negative NEG      Leukocyte Esterase Negative NEG     URINE MICROSCOPIC ONLY    Collection Time: 01/26/22  5:45 PM   Result Value Ref Range    WBC 0 to 3 0 - 4 /hpf    RBC Negative 0 - 5 /hpf    Epithelial cells FEW 0 - 5 /lpf    Bacteria Negative NEG /hpf       Radiologic Studies -   XR CHEST PORT   Final Result   No acute cardiopulmonary process. Stable bilateral calcified pleural plaques, likely sequela of prior asbestos   exposure. CT Results  (Last 48 hours)    None        CXR Results  (Last 48 hours)               01/26/22 1753  XR CHEST PORT Final result    Impression:  No acute cardiopulmonary process. Stable bilateral calcified pleural plaques, likely sequela of prior asbestos   exposure. Narrative:  ONE VIEW CHEST RADIOGRAPH        INDICATION: cough, covid like symptoms. Fatigue, sore throat, nasal congestion,   and cough. COMPARISON: Chest radiograph 10/12/2020. TECHNIQUE: PA view of the chest       FINDINGS:       LINES/TUBES: None. MEDIASTINUM: Postsurgical changes of CABG. Aortic valve replacement. Cardiac   silhouette is within normal limits. LUNGS: Similar appearance of multiple calcified pleural plaques bilaterally. No   focal consolidation, pleural effusion, or pneumothorax. BONES/OTHER: No acute osseous abnormality. Medical Decision Making   I am the first provider for this patient. I reviewed the vital signs, available nursing notes, past medical history, past surgical history, family history and social history. Vital Signs-Reviewed the patient's vital signs. Records Reviewed: Nursing Notes and Old Medical Records     Procedures: None   Procedures    Provider Notes (Medical Decision Making):      Concern for COVID, other URI, pneumonia, infectious cause, electrolyte imbalance, dehydration    Pt vital signs normal, normal exam. No abnormalities on neuro exam, pt has normal gait. No weakness noted. Lungs CTA. Chest x ray read by myself and Dr. Keyona Cameron, attending shows no abnormalities other than wiring/clips from previous surgeries. Pt denies any chest pain or SOB, wife states is a mild cough. Was concerned about the fatigue. Possible that is COVID, and would be fatigued, but symptoms appear limited to URI symptoms. Discussed case with Dr. Keyona Cameron given pt multiple comorbidities. No other recommendations or testing required, make sure follows up with PCP and treat symptoms. Return if any worsening. Pending COVID test.                MED RECONCILIATION:  No current facility-administered medications for this encounter. Current Outpatient Medications   Medication Sig    empagliflozin (Jardiance) 25 mg tablet Take 25 mg by mouth daily.  benzonatate (Tessalon Perles) 100 mg capsule Take 1 Capsule by mouth three (3) times daily as needed for Cough for up to 7 days.  metFORMIN (GLUCOPHAGE) 500 mg tablet Take 500 mg by mouth daily (with breakfast).  glimepiride (AMARYL) 4 mg tablet Take 4 mg by mouth every morning.  metoprolol tartrate (LOPRESSOR) 25 mg tablet Take 0.5 Tabs by mouth two (2) times a day.  clopidogreL (PLAVIX) 75 mg tab Take 75 mg by mouth daily.  donepeziL (ARICEPT) 10 mg tablet Take 10 mg by mouth nightly.  aspirin delayed-release 81 mg tablet Take 81 mg by mouth daily.  ezetimibe-simvastatin (VYTORIN 10/40) 10-40 mg per tablet Take 1 Tab by mouth nightly.  MULTIVITAMIN W/IRON, MINERALS (MULTIVITAMIN AND MINERALS PO) Take 1 Tab by mouth daily.  Zinc Mth/Copper/Saw Palm/Gnsg (PROSTATE HEALTH FORMULA PO) Take  by mouth.  glucose blood VI test strips (Accu-Chek Tracee Plus test strp) strip     Accu-Chek Tracee Plus test strp strip     lancets (Accu-Chek Softclix Lancets) misc     FIBER CHOICE PO Take 1 Tab by mouth daily.    Yaz Hopkins, Indications: [facility]       Disposition:  Home DISCHARGE NOTE:   Pt has been reexamined. Patient has no new complaints, changes, or physical findings. Care plan outlined and precautions discussed. Results of workup were reviewed with the patient. All medications were reviewed with the patient. All of pt's questions and concerns were addressed. Patient was instructed and agrees to follow up with PCP as well as to return to the ED upon further deterioration. Patient is ready to go home. Follow-up Information    None         Current Discharge Medication List      START taking these medications    Details   benzonatate (Tessalon Perles) 100 mg capsule Take 1 Capsule by mouth three (3) times daily as needed for Cough for up to 7 days. Qty: 30 Capsule, Refills: 0  Start date: 1/26/2022, End date: 2/2/2022                 Diagnosis     Clinical Impression:   1. Suspected COVID-19 virus infection    2. Other fatigue    3. Cough    4. Sinus congestion    5. Dementia without behavioral disturbance, unspecified dementia type ([facility] [address]          \"Please note that this dictation was completed with [employer], the computer voice recognition software. Quite often unanticipated grammatical, syntax, homophones, and other interpretive errors are inadvertently transcribed by the computer software. Please disregard these errors. Please excuse any errors that have escaped final proofreading. \"

## 2022-01-26 NOTE — ED TRIAGE NOTES
Patient presents with c/o fatigue, sore throat, nasal congestion and cough that began yesterday. Family states that patient has memory issues. Advises that patient is fully vaccinated.

## 2022-01-26 NOTE — DISCHARGE INSTRUCTIONS
Continue home medication  Can use over the counter cough medicine as needed, recommend guaifenasin; can also try tessalon perles that have been sent to your pharmacy  Nasal spray can help with sinus congestion- saline spray  Your COVID test results will be online for you to review. See instructions on making a MyChart  Recommend follow up with your primary care provider within a week; return to the Emergency Department if you have any chest pain, shortness of breath, or weakness    [facility] Guidance for Preventing the Spread of Coronavirus Disease 2019 (COVID-19) in Homes and Residential Communities  Prevention steps for:  People with confirmed or suspected COVID-19 (including persons under investigation) who do not need to be hospitalized  and  People with confirmed COVID-19 who were hospitalized and determined to be medically stable to go home  Your healthcare provider and public health staff will evaluate whether you can be cared for at home. If it is determined that you do not need to be hospitalized and can be isolated at home, you will be monitored by staff from your local or state health department. You should follow the prevention steps below until a healthcare provider or local or state health department says you can return to your normal activities. Stay home except to get medical care  You should restrict activities outside your home, except for getting medical care. Do not go to work, school, or public areas. Avoid using public transportation, ride-sharing, or taxis. Separate yourself from other people and animals in your home  People: As much as possible, you should stay in a specific room and away from other people in your home. Also, you should use a separate bathroom, if available. Animals: You should restrict contact with pets and other animals while you are sick with COVID-19, just like you would around other people.  Although there have not been reports of pets or other animals becoming sick with COVID-19, it is still recommended that people sick with COVID-19 limit contact with animals until more information is known about the virus. When possible, have another member of your household care for your animals while you are sick. If you are sick with COVID-19, avoid contact with your pet, including petting, snuggling, being kissed or licked, and sharing food. If you must care for your pet or be around animals while you are sick, wash your hands before and after you interact with pets and wear a facemask. Call ahead before visiting your doctor  If you have a medical appointment, call the healthcare provider and tell them that you have or may have COVID-19. This will help the healthcare provider's office take steps to keep other people from getting infected or exposed. Wear a facemask  You should wear a facemask when you are around other people (e.g., sharing a room or vehicle) or pets and before you enter a healthcare provider's office. If you are not able to wear a facemask (for example, because it causes trouble breathing), then people who live with you should not stay in the same room with you, or they should wear a facemask if they enter your room. Cover your coughs and sneezes  Cover your mouth and nose with a tissue when you cough or sneeze. Throw used tissues in a lined trash can; immediately wash your hands with soap and water for at least 20 seconds or clean your hands with an alcohol-based hand  that contains 60 to 95% alcohol, covering all surfaces of your hands and rubbing them together until they feel dry. Soap and water should be used preferentially if hands are visibly dirty. Clean your hands often  Wash your hands often with soap and water for at least 20 seconds or clean your hands with an alcohol-based hand  that contains 60 to 95% alcohol, covering all surfaces of your hands and rubbing them together until they feel dry.  Soap and water should be used preferentially if hands are visibly dirty. Avoid touching your eyes, nose, and mouth with unwashed hands. Avoid sharing personal household items  You should not share dishes, drinking glasses, cups, eating utensils, towels, or bedding with other people or pets in your home. After using these items, they should be washed thoroughly with soap and water. Clean all high-touch surfaces everyday  High touch surfaces include counters, tabletops, doorknobs, bathroom fixtures, toilets, phones, keyboards, tablets, and bedside tables. Also, clean any surfaces that may have blood, stool, or body fluids on them. Use a household cleaning spray or wipe, according to the label instructions. Labels contain instructions for safe and effective use of the cleaning product including precautions you should take when applying the product, such as wearing gloves and making sure you have good ventilation during use of the product. Monitor your symptoms  Seek prompt medical attention if your illness is worsening (e.g., difficulty breathing). Before seeking care, call your healthcare provider and tell them that you have, or are being evaluated for, COVID-19. Put on a facemask before you enter the facility. These steps will help the healthcare provider's office to keep other people in the office or waiting room from getting infected or exposed. Ask your healthcare provider to call the local or state health department. Persons who are placed under active monitoring or facilitated self-monitoring should follow instructions provided by their local health department or occupational health professionals, as appropriate. If you have a medical emergency and need to call 911, notify the dispatch personnel that you have, or are being evaluated for COVID-19. If possible, put on a facemask before emergency medical services arrive.   Discontinuing home isolation  Patients with confirmed COVID-19 should remain under home isolation precautions until the risk of secondary transmission to others is thought to be low. The decision to discontinue home isolation precautions should be made on a case-by-case basis, in consultation with healthcare providers and state and local health departments. Recommended precautions for household members, intimate partners, and caregivers in a nonhealthcare setting of  A patient with symptomatic laboratory-confirmed COVID-19  or  A patient under investigation  Household members, intimate partners, and caregivers in a nonhealthcare setting may have close contact2 with a person with symptomatic, laboratory-confirmed COVID-19 or a person under investigation. Close contacts should monitor their health; they should call their healthcare provider right away if they develop symptoms suggestive of COVID-19 (e.g., fever, cough, shortness of breath)   Close contacts should also follow these recommendations:  Make sure that you understand and can help the patient follow their healthcare provider's instructions for medication(s) and care. You should help the patient with basic needs in the home and provide support for getting groceries, prescriptions, and other personal needs. Monitor the patient's symptoms. If the patient is getting sicker, call his or her healthcare provider and tell them that the patient has laboratory-confirmed COVID-19. This will help the healthcare provider's office take steps to keep other people in the office or waiting room from getting infected. Ask the healthcare provider to call the local or state health department for additional guidance. If the patient has a medical emergency and you need to call 911, notify the dispatch personnel that the patient has, or is being evaluated for COVID-19. Household members should stay in another room or be  from the patient as much as possible. Household members should use a separate bedroom and bathroom, if available.   Prohibit visitors who do not have an essential need to be in the home. Household members should care for any pets in the home. Do not handle pets or other animals while sick. Make sure that shared spaces in the home have good air flow, such as by an air conditioner or an opened window, weather permitting. Perform hand hygiene frequently. Wash your hands often with soap and water for at least 20 seconds or use an alcohol-based hand  that contains 60 to 95% alcohol, covering all surfaces of your hands and rubbing them together until they feel dry. Soap and water should be used preferentially if hands are visibly dirty. Avoid touching your eyes, nose, and mouth with unwashed hands. You and the patient should wear a facemask if you are in the same room. Wear a disposable facemask and gloves when you touch or have contact with the patient's blood, stool, or body fluids, such as saliva, sputum, nasal mucus, vomit, urine. Throw out disposable facemasks and gloves after using them. Do not reuse. When removing personal protective equipment, first remove and dispose of gloves. Then, immediately clean your hands with soap and water or alcohol-based hand . Next, remove and dispose of facemask, and immediately clean your hands again with soap and water or alcohol-based hand . Avoid sharing household items with the patient. You should not share dishes, drinking glasses, cups, eating utensils, towels, bedding, or other items. After the patient uses these items, you should wash them thoroughly (see below [employer]). Clean all high-touch surfaces, such as counters, tabletops, doorknobs, bathroom fixtures, toilets, phones, keyboards, tablets, and bedside tables, every day. Also, clean any surfaces that may have blood, stool, or body fluids on them. Use a household cleaning spray or wipe, according to the label instructions.  Labels contain instructions for safe and effective use of the cleaning product including precautions you should take when applying the product, such as wearing gloves and making sure you have good ventilation during use of the product. [address] thoroughly. Immediately remove and wash clothes or bedding that have blood, stool, or body fluids on them. Wear disposable gloves while handling soiled items and keep soiled items away from your body. Clean your hands (with soap and water or an alcohol-based hand ) immediately after removing your gloves. Read and follow directions on labels of laundry or clothing items and detergent. In general, using a normal laundry detergent according to washing machine instructions and dry thoroughly using the warmest temperatures recommended on the clothing label. Place all used disposable gloves, facemasks, and other contaminated items in a lined container before disposing of them with other household waste. Clean your hands (with soap and water or an alcohol-based hand ) immediately after handling these items. Soap and water should be used preferentially if hands are visibly dirty. Discuss any additional questions with your state or local health department or healthcare provider. Footnotes  2Close contact is defined as--  a) being within approximately 6 feet (2 meters) of a COVID-19 case for a prolonged period of time; close contact can occur while caring for, living with, visiting, or sharing a health care waiting area or room with a COVID-19 case  - or -  b) having direct contact with infectious secretions of a COVID-19 case (e.g., being coughed on).   Page last reviewed: February 18, 2020   Content source: [facility] ([facility] Division of Viral Diseases

## 2022-01-27 ENCOUNTER — PATIENT OUTREACH (OUTPATIENT)
Dept: CASE MANAGEMENT | Age: 77
End: 2022-01-27

## 2022-01-27 LAB — SARS-COV-2, NAA: NOT DETECTED

## 2022-01-28 ENCOUNTER — PATIENT OUTREACH (OUTPATIENT)
Dept: CASE MANAGEMENT | Age: 77
End: 2022-01-28

## 2022-01-28 NOTE — PROGRESS NOTES
Follow Up Call    Challenges to be reviewed by the provider   Additional needs identified to be addressed with provider: no  none           Encounter was not routed to provider for escalation. Method of communication with provider: none. Contacted the patient by telephone to follow up after ED. Spoke with wife. She states he has been in bed a day. He is doing about the same. No new or worsen symptoms. Status: not changed    [facility] follow up appointment(s): No future appointments. Non-[facility] follow up appointment(s): pcp as needed  Follow up appointment completed? n/a. Provided contact information for future needs. Plan for follow-up call in 5-7 days based on severity of symptoms and risk factors.   Plan for next call: follow up close     Angela Fitzgerald LPN

## 2022-02-04 ENCOUNTER — PATIENT OUTREACH (OUTPATIENT)
Dept: CASE MANAGEMENT | Age: 77
End: 2022-02-04

## 2022-02-04 NOTE — PROGRESS NOTES
Patient resolved from [address] Transitions episode on 2/4/2022. Patient currently reports that the following symptoms have improved:  no new symptoms. No further outreach scheduled with this LPN CC. Episode of Care resolved. Patient has this LPN CC contact information if future needs arise.

## 2022-03-19 PROBLEM — R55 SYNCOPE: Status: ACTIVE | Noted: 2020-10-11

## 2022-08-20 ENCOUNTER — APPOINTMENT (OUTPATIENT)
Dept: GENERAL RADIOLOGY | Age: 77
End: 2022-08-20
Attending: STUDENT IN AN ORGANIZED HEALTH CARE EDUCATION/TRAINING PROGRAM
Payer: MEDICARE

## 2022-08-20 ENCOUNTER — APPOINTMENT (OUTPATIENT)
Dept: CT IMAGING | Age: 77
End: 2022-08-20
Attending: STUDENT IN AN ORGANIZED HEALTH CARE EDUCATION/TRAINING PROGRAM
Payer: MEDICARE

## 2022-08-20 ENCOUNTER — HOSPITAL ENCOUNTER (EMERGENCY)
Age: 77
Discharge: HOME | End: 2022-08-21
Attending: STUDENT IN AN ORGANIZED HEALTH CARE EDUCATION/TRAINING PROGRAM
Payer: MEDICARE

## 2022-08-20 DIAGNOSIS — M25.559 HIP PAIN: ICD-10-CM

## 2022-08-20 DIAGNOSIS — R55 SYNCOPE AND COLLAPSE: Primary | ICD-10-CM

## 2022-08-20 PROCEDURE — 70450 CT HEAD/BRAIN W/O DYE: CPT

## 2022-08-20 PROCEDURE — 71045 X-RAY EXAM CHEST 1 VIEW: CPT

## 2022-08-20 PROCEDURE — 99285 EMERGENCY DEPT VISIT HI MDM: CPT

## 2022-08-20 PROCEDURE — 73502 X-RAY EXAM HIP UNI 2-3 VIEWS: CPT

## 2022-08-20 RX ORDER — ACETAMINOPHEN 500 MG
1000 TABLET ORAL ONCE
Status: DISCONTINUED | OUTPATIENT
Start: 2022-08-20 | End: 2022-08-21 | Stop reason: HOSPADM

## 2022-08-21 VITALS
HEIGHT: 65 IN | HEART RATE: 68 BPM | OXYGEN SATURATION: 96 % | BODY MASS INDEX: 28.99 KG/M2 | RESPIRATION RATE: 17 BRPM | DIASTOLIC BLOOD PRESSURE: 85 MMHG | WEIGHT: 174 LBS | SYSTOLIC BLOOD PRESSURE: 135 MMHG | TEMPERATURE: 98.6 F

## 2022-08-21 LAB
ALBUMIN SERPL-MCNC: 3.6 G/DL (ref 3.4–5)
ALBUMIN/GLOB SERPL: 1.3 {RATIO} (ref 0.8–1.7)
ALP SERPL-CCNC: 91 U/L (ref 45–117)
ALT SERPL-CCNC: 37 U/L (ref 16–61)
ANION GAP SERPL CALC-SCNC: 6 MMOL/L (ref 3–18)
AST SERPL-CCNC: 32 U/L (ref 10–38)
ATRIAL RATE: 67 BPM
BASOPHILS # BLD: 0 K/UL (ref 0–0.1)
BASOPHILS NFR BLD: 1 % (ref 0–2)
BILIRUB SERPL-MCNC: 0.4 MG/DL (ref 0.2–1)
BUN SERPL-MCNC: 13 MG/DL (ref 7–18)
BUN/CREAT SERPL: 9 (ref 12–20)
CALCIUM SERPL-MCNC: 9.1 MG/DL (ref 8.5–10.1)
CALCULATED P AXIS, ECG09: 47 DEGREES
CALCULATED R AXIS, ECG10: -30 DEGREES
CALCULATED T AXIS, ECG11: 45 DEGREES
CHLORIDE SERPL-SCNC: 111 MMOL/L (ref 100–111)
CO2 SERPL-SCNC: 27 MMOL/L (ref 21–32)
CREAT SERPL-MCNC: 1.48 MG/DL (ref 0.6–1.3)
DIAGNOSIS, 93000: NORMAL
DIFFERENTIAL METHOD BLD: ABNORMAL
EOSINOPHIL # BLD: 0.2 K/UL (ref 0–0.4)
EOSINOPHIL NFR BLD: 3 % (ref 0–5)
ERYTHROCYTE [DISTWIDTH] IN BLOOD BY AUTOMATED COUNT: 13.6 % (ref 11.6–14.5)
ETHANOL SERPL-MCNC: <3 MG/DL (ref 0–3)
GLOBULIN SER CALC-MCNC: 2.8 G/DL (ref 2–4)
GLUCOSE SERPL-MCNC: 158 MG/DL (ref 74–99)
HCT VFR BLD AUTO: 38.5 % (ref 36–48)
HGB BLD-MCNC: 12.8 G/DL (ref 13–16)
IMM GRANULOCYTES # BLD AUTO: 0 K/UL (ref 0–0.04)
IMM GRANULOCYTES NFR BLD AUTO: 0 % (ref 0–0.5)
INR PPP: 1 (ref 0.8–1.2)
LYMPHOCYTES # BLD: 1.3 K/UL (ref 0.9–3.6)
LYMPHOCYTES NFR BLD: 21 % (ref 21–52)
MAGNESIUM SERPL-MCNC: 2.4 MG/DL (ref 1.6–2.6)
MCH RBC QN AUTO: 28.7 PG (ref 24–34)
MCHC RBC AUTO-ENTMCNC: 33.2 G/DL (ref 31–37)
MCV RBC AUTO: 86.3 FL (ref 78–100)
MONOCYTES # BLD: 0.6 K/UL (ref 0.05–1.2)
MONOCYTES NFR BLD: 9 % (ref 3–10)
NEUTS SEG # BLD: 4 K/UL (ref 1.8–8)
NEUTS SEG NFR BLD: 66 % (ref 40–73)
NRBC # BLD: 0 K/UL (ref 0–0.01)
NRBC BLD-RTO: 0 PER 100 WBC
P-R INTERVAL, ECG05: 268 MS
PLATELET # BLD AUTO: 133 K/UL (ref 135–420)
PMV BLD AUTO: 10.1 FL (ref 9.2–11.8)
POTASSIUM SERPL-SCNC: 3.9 MMOL/L (ref 3.5–5.5)
PROT SERPL-MCNC: 6.4 G/DL (ref 6.4–8.2)
PROTHROMBIN TIME: 13.4 SEC (ref 11.5–15.2)
Q-T INTERVAL, ECG07: 438 MS
QRS DURATION, ECG06: 84 MS
QTC CALCULATION (BEZET), ECG08: 462 MS
RBC # BLD AUTO: 4.46 M/UL (ref 4.35–5.65)
SODIUM SERPL-SCNC: 144 MMOL/L (ref 136–145)
TROPONIN-HIGH SENSITIVITY: 12 NG/L (ref 0–78)
VENTRICULAR RATE, ECG03: 67 BPM
WBC # BLD AUTO: 6.1 K/UL (ref 4.6–13.2)

## 2022-08-21 PROCEDURE — 83735 ASSAY OF MAGNESIUM: CPT

## 2022-08-21 PROCEDURE — 85610 PROTHROMBIN TIME: CPT

## 2022-08-21 PROCEDURE — 82077 ASSAY SPEC XCP UR&BREATH IA: CPT

## 2022-08-21 PROCEDURE — 85025 COMPLETE CBC W/AUTO DIFF WBC: CPT

## 2022-08-21 PROCEDURE — 93005 ELECTROCARDIOGRAM TRACING: CPT

## 2022-08-21 PROCEDURE — 84484 ASSAY OF TROPONIN QUANT: CPT

## 2022-08-21 PROCEDURE — 80053 COMPREHEN METABOLIC PANEL: CPT

## 2022-08-21 NOTE — ED PROVIDER NOTES
HPI   Patient is 73-year-old male who presents after fall today. Patient was at [address] when he was walking to his car when he fell down. Did not think that he tripped or fell and just fell to the ground. No significant length of time for loss of consciousness. Patient states he remembers everything that happened prior to. Denies any headache, nausea or vomiting, blood thinners. Denies any chest pain, difficulty breathing, swelling of his lower extremities, history of blood clots or hemoptysis. Denies any recent fever, sweats or chills. He was initially able to get up on his own and went home however later on when he got up to go to the bathroom he felt unsteady so they came to the emergency department. He is having pain in his right hip. He is still able to apply pressure to it.     Past Medical History:   Diagnosis Date    CAD (coronary artery disease)     Aortic Stenosis    Carotid artery stenosis     Dementia (HCC)     Diabetes (HCC)     Glaucoma     High cholesterol     Hypertension     Ill-defined condition     Bilateral Carotid Artery Stenosis       Past Surgical History:   Procedure Laterality Date    [facility] CARDIAC SURG PROCEDURE UNLIST      bypass         Family History:   Problem Relation Age of Onset    Diabetes Other        Social History     Socioeconomic History    Marital status:      Spouse name: Not on file    Number of children: Not on file    Years of education: Not on file    Highest education level: Not on file   Occupational History    Not on file   Tobacco Use    Smoking status: Never    Smokeless tobacco: Never   Substance and Sexual Activity    Alcohol use: No    Drug use: No    Sexual activity: Not on file   Other Topics Concern    Not on file   Social History Narrative    Not on file     Social Determinants of Health     Financial Resource Strain: Not on file   Food Insecurity: Not on file   Transportation Needs: Not on file   Physical Activity: Not on file   Stress: Not on file   Social Connections: Not on file   Intimate Partner Violence: Not on file   Housing Stability: Not on file         ALLERGIES: Patient has no known allergies. Review of Systems  Constitutional: No fever  HENT: No ear pain  Eyes: No change in vision  Respiratory: No SOB  Cardio: No chest pain  GI: No blood in stool  : No hematuria  MSK: No back pain  Skin: No rashes  Neuro: No headache    Vitals:    08/20/22 2125 08/20/22 2126   BP: (!) 166/47 (!) 153/74   Pulse: 74 68   Resp: 18 17   Temp: 98.6 °F (37 °C)    SpO2: 98% 96%   Weight: 78.9 kg (174 lb)    Height: 5' 5\" (1.651 m)             Physical Exam   General: No acute distress  Head: Normocephalic, atraumatic  Psych: Cooperative and alert  Eyes: No scleral icterus, normal conjunctiva  ENT: Moist oral mucosa  Neck: Supple, nontender  CV: Regular rate and rhythm, no pitting edema, palpable radial pulses  Pulm: Clear breath sounds bilaterally without any wheezing or rhonchi, normal respiratory rate  GI: Normal bowel sounds, soft, non-tender  MSK: Moves all four extremities, tenderness to palpation with squeezing of the pelvis, no tenderness to the right hip specifically region, patient able to lift up both legs with appropriate strength  Skin: No rashes  Neuro: Face is symmetrical, tongue protrudes midline, vision and hearing grossly intact, patient seems to have slowed speech with word finding difficulty, however baseline per wife, 5/5 strength bilateral upper and lower extremities, no pronator drift, finger-to-nose cerebellar testing is within normal limits, no extinction, sensation grossly intact, can name common objects, alert and oriented x2, negative Romberg,         MDM    Patient is 38-year-old male who presents after a syncopal episode with right hip pain. Will obtain x-rays of his right hip more concerning for pelvic fracture however otherwise neurovascularly intact.   We will also do evaluation for why he syncopized including cardiac etiologies. On his neurologic exam I felt that he had some word finding difficulties however according to his wife this is his baseline and he was alert and oriented only x2 but again has a history of dementia and this is baseline and therefore NIH scale is 0 for acute issues. We will however obtain a head CT as he is unable to fully describe the events that transpired with his fall or if he hit his head. EKG shows normal sinus rhythm with a rate of 67 bpm.  QRS is narrow, there is left axis deviation, R wave progression across pericardium is satisfactory. No specific ST elevation or depression noted. Does have a prolonged OK interval consistent with a first-degree AV block. Overall shows no signs of ACS or arrhythmia. Head CT shows no acute intracranial process. Chest x-ray shows no acute cardiopulmonary process per my interpretation. X-ray of the pelvis and hip shows no acute fracture dislocations. CBC, INR, CMP, troponin, alcohol level are all within normal limits with exception of a mildly elevated creatinine which is at the patient's baseline. Upon reexamination patient was able to ambulate and stand. No dizziness. Therefore do feel he is stable for discharge. Patient stable for discharge at this time. Patient is in agreement with the plan to be discharged at this time. All the patient's questions were answered. Patient was given written instructions on the diagnosis, and states understanding of the plan moving forward. We did discuss important signs and symptoms that should prompt quick return to the emergency department. Disposition: Patient was discharged home in stable condition.   They will follow up with their PCP and cardiology    Prescriptions: None    Diagnosis: Acute syncope  Acute right hip pain  Procedures hard copy, drawn during this pregnancy

## 2022-08-21 NOTE — DISCHARGE INSTRUCTIONS
We did not find any bad reasons why you passed out today however would still recommend close follow-up with a cardiologist to have your heart fully assessed. Your hip is not broken however that does not mean that you will have some tenderness there. Recommend taking Tylenol and ibuprofen as needed for pain and discomfort.   Also consider ice and rest.

## 2022-08-21 NOTE — ED NOTES
I have reviewed discharge instructions with the patient and his son. The patient and his son verbalized understanding.

## 2022-08-21 NOTE — ED TRIAGE NOTES
Patient via EMS c/o GLF earlier today with increasing right hip pain and difficulty walking. States his right leg gave out. Denies any other pain or injuries. Denies hitting head or LOC.

## 2022-08-23 ENCOUNTER — TRANSCRIBE ORDER (OUTPATIENT)
Dept: SCHEDULING | Age: 77
End: 2022-08-23

## 2022-08-23 DIAGNOSIS — I73.89 OTHER SPECIFIED PERIPHERAL VASCULAR DISEASES (HCC): Primary | ICD-10-CM

## 2022-08-30 ENCOUNTER — HOSPITAL ENCOUNTER (OUTPATIENT)
Dept: VASCULAR SURGERY | Age: 77
Discharge: HOME | End: 2022-08-30
Attending: INTERNAL MEDICINE
Payer: MEDICARE

## 2022-08-30 DIAGNOSIS — I73.89 OTHER SPECIFIED PERIPHERAL VASCULAR DISEASES (HCC): ICD-10-CM

## 2022-08-30 LAB
LEFT ARTERIAL PROX ANASTOMOSIS EDV: 12.4 CM/S
LEFT ARTERIAL PROX ANASTOMOSIS PSV: 61.6 CM/S
LEFT CCA DIST DIAS: 12.4 CM/S
LEFT CCA DIST SYS: 73.3 CM/S
LEFT CCA MID DIAS: 7.25 CM/S
LEFT CCA MID SYS: 66.78 CM/S
LEFT CCA PROX DIAS: 13.7 CM/S
LEFT CCA PROX SYS: 66.8 CM/S
LEFT DIST OUTFLOW EDV: 15 CM/S
LEFT DIST OUTFLOW PSV: 109.6 CM/S
LEFT ECA DIAS: 0 CM/S
LEFT ECA SYS: 102 CM/S
LEFT GRAFT 1 NAME: NORMAL
LEFT ICA DIST DIAS: 10.6 CM/S
LEFT ICA DIST SYS: 44.5 CM/S
LEFT ICA/CCA SYS: 0.61 NO UNITS
LEFT INFLOW ARTERY EDV: 12.4 CM/S
LEFT INFLOW ARTERY PSV: 73.3 CM/S
LEFT MID OUTFLOW EDV: 22.8 CM/S
LEFT MID OUTFLOW PSV: 86.2 CM/S
LEFT OUTFLOW VESSEL EDV: 19.9 CM/S
LEFT OUTFLOW VESSEL PSV: 66.3 CM/S
LEFT PROX OUTFLOW EDV: 21.5 CM/S
LEFT PROX OUTFLOW PSV: 66.8 CM/S
LEFT VENOUS DIST ANASTOMOSIS EDV: 18.9 CM/S
LEFT VENOUS DIST ANASTOMOSIS PSV: 107.6 CM/S
LEFT VERTEBRAL DIAS: 11.52 CM/S
LEFT VERTEBRAL SYS: 38.7 CM/S
RIGHT ARTERIAL PROX ANASTOMOSIS EDV: 11.7 CM/S
RIGHT ARTERIAL PROX ANASTOMOSIS PSV: 65.5 CM/S
RIGHT CCA DIST DIAS: 11.8 CM/S
RIGHT CCA DIST SYS: 57.7 CM/S
RIGHT CCA MID DIAS: 10.42 CM/S
RIGHT CCA MID SYS: 58.57 CM/S
RIGHT CCA PROX DIAS: 8.5 CM/S
RIGHT CCA PROX SYS: 45.8 CM/S
RIGHT DIST OUTFLOW EDV: 25.3 CM/S
RIGHT DIST OUTFLOW PSV: 104.5 CM/S
RIGHT ECA DIAS: 8.41 CM/S
RIGHT ECA SYS: 208.9 CM/S
RIGHT GRAFT 1 NAME: NORMAL
RIGHT ICA DIST DIAS: 20.4 CM/S
RIGHT ICA DIST SYS: 70.4 CM/S
RIGHT ICA/CCA SYS: 1.2 NO UNITS
RIGHT INFLOW ARTERY EDV: 11.8 CM/S
RIGHT INFLOW ARTERY PSV: 57.7 CM/S
RIGHT MID OUTFLOW EDV: 32.9 CM/S
RIGHT MID OUTFLOW PSV: 116.7 CM/S
RIGHT OUTFLOW VESSEL EDV: 20.9 CM/S
RIGHT OUTFLOW VESSEL PSV: 111.5 CM/S
RIGHT PROX OUTFLOW EDV: 12.8 CM/S
RIGHT PROX OUTFLOW PSV: 63.3 CM/S
RIGHT VENOUS DIST ANASTOMOSIS EDV: 21.6 CM/S
RIGHT VENOUS DIST ANASTOMOSIS PSV: 114.6 CM/S
RIGHT VERTEBRAL DIAS: 6.78 CM/S
RIGHT VERTEBRAL SYS: 22.9 CM/S
VAS LEFT SUBCLAVIAN PROX EDV: 0 CM/S
VAS LEFT SUBCLAVIAN PROX PSV: 72.7 CM/S
VAS RIGHT SUBCLAVIAN PROX EDV: 16.3 CM/S
VAS RIGHT SUBCLAVIAN PROX PSV: 124.2 CM/S

## 2022-08-30 PROCEDURE — 93880 EXTRACRANIAL BILAT STUDY: CPT

## 2022-08-31 ENCOUNTER — APPOINTMENT (OUTPATIENT)
Dept: CT IMAGING | Age: 77
End: 2022-08-31
Attending: EMERGENCY MEDICINE
Payer: MEDICARE

## 2022-08-31 ENCOUNTER — APPOINTMENT (OUTPATIENT)
Dept: GENERAL RADIOLOGY | Age: 77
End: 2022-08-31
Attending: EMERGENCY MEDICINE
Payer: MEDICARE

## 2022-08-31 ENCOUNTER — HOSPITAL ENCOUNTER (EMERGENCY)
Age: 77
Discharge: HOME | End: 2022-08-31
Attending: EMERGENCY MEDICINE
Payer: MEDICARE

## 2022-08-31 VITALS
RESPIRATION RATE: 18 BRPM | DIASTOLIC BLOOD PRESSURE: 71 MMHG | HEART RATE: 73 BPM | TEMPERATURE: 97.9 F | HEIGHT: 65 IN | BODY MASS INDEX: 28.99 KG/M2 | SYSTOLIC BLOOD PRESSURE: 145 MMHG | OXYGEN SATURATION: 98 % | WEIGHT: 174 LBS

## 2022-08-31 DIAGNOSIS — R73.9 HYPERGLYCEMIA: ICD-10-CM

## 2022-08-31 DIAGNOSIS — R68.2 DRY MOUTH: Primary | ICD-10-CM

## 2022-08-31 LAB
ALBUMIN SERPL-MCNC: 3.7 G/DL (ref 3.4–5)
ALBUMIN/GLOB SERPL: 1.2 {RATIO} (ref 0.8–1.7)
ALP SERPL-CCNC: 83 U/L (ref 45–117)
ALT SERPL-CCNC: 35 U/L (ref 16–61)
ANION GAP SERPL CALC-SCNC: 11 MMOL/L (ref 3–18)
APPEARANCE UR: CLEAR
APTT PPP: 26.2 SEC (ref 23–36.4)
AST SERPL-CCNC: 39 U/L (ref 10–38)
BASOPHILS # BLD: 0 K/UL (ref 0–0.1)
BASOPHILS NFR BLD: 1 % (ref 0–2)
BILIRUB SERPL-MCNC: 0.4 MG/DL (ref 0.2–1)
BILIRUB UR QL: NEGATIVE
BUN SERPL-MCNC: 15 MG/DL (ref 7–18)
BUN/CREAT SERPL: 9 (ref 12–20)
CALCIUM SERPL-MCNC: 9.3 MG/DL (ref 8.5–10.1)
CHLORIDE SERPL-SCNC: 108 MMOL/L (ref 100–111)
CO2 SERPL-SCNC: 23 MMOL/L (ref 21–32)
COLOR UR: YELLOW
CREAT SERPL-MCNC: 1.71 MG/DL (ref 0.6–1.3)
DIFFERENTIAL METHOD BLD: ABNORMAL
EOSINOPHIL # BLD: 0.1 K/UL (ref 0–0.4)
EOSINOPHIL NFR BLD: 2 % (ref 0–5)
ERYTHROCYTE [DISTWIDTH] IN BLOOD BY AUTOMATED COUNT: 13.7 % (ref 11.6–14.5)
EST. AVERAGE GLUCOSE BLD GHB EST-MCNC: 197 MG/DL
GLOBULIN SER CALC-MCNC: 3.2 G/DL (ref 2–4)
GLUCOSE BLD STRIP.AUTO-MCNC: 112 MG/DL (ref 70–110)
GLUCOSE BLD STRIP.AUTO-MCNC: 161 MG/DL (ref 70–110)
GLUCOSE SERPL-MCNC: 243 MG/DL (ref 74–99)
GLUCOSE UR STRIP.AUTO-MCNC: >1000 MG/DL
HBA1C MFR BLD: 8.5 % (ref 4.2–5.6)
HCT VFR BLD AUTO: 39 % (ref 36–48)
HGB BLD-MCNC: 12.9 G/DL (ref 13–16)
HGB UR QL STRIP: NEGATIVE
IMM GRANULOCYTES # BLD AUTO: 0 K/UL (ref 0–0.04)
IMM GRANULOCYTES NFR BLD AUTO: 0 % (ref 0–0.5)
INR PPP: 1 (ref 0.8–1.2)
KETONES UR QL STRIP.AUTO: NEGATIVE MG/DL
LEUKOCYTE ESTERASE UR QL STRIP.AUTO: NEGATIVE
LYMPHOCYTES # BLD: 1.1 K/UL (ref 0.9–3.6)
LYMPHOCYTES NFR BLD: 17 % (ref 21–52)
MAGNESIUM SERPL-MCNC: 2.1 MG/DL (ref 1.6–2.6)
MCH RBC QN AUTO: 29.1 PG (ref 24–34)
MCHC RBC AUTO-ENTMCNC: 33.1 G/DL (ref 31–37)
MCV RBC AUTO: 88 FL (ref 78–100)
MONOCYTES # BLD: 0.5 K/UL (ref 0.05–1.2)
MONOCYTES NFR BLD: 8 % (ref 3–10)
NEUTS SEG # BLD: 4.5 K/UL (ref 1.8–8)
NEUTS SEG NFR BLD: 72 % (ref 40–73)
NITRITE UR QL STRIP.AUTO: NEGATIVE
NRBC # BLD: 0 K/UL (ref 0–0.01)
NRBC BLD-RTO: 0 PER 100 WBC
PH UR STRIP: 5.5 [PH] (ref 5–8)
PLATELET # BLD AUTO: 168 K/UL (ref 135–420)
PMV BLD AUTO: 10.3 FL (ref 9.2–11.8)
POTASSIUM SERPL-SCNC: 3.8 MMOL/L (ref 3.5–5.5)
PROT SERPL-MCNC: 6.9 G/DL (ref 6.4–8.2)
PROT UR STRIP-MCNC: NEGATIVE MG/DL
PROTHROMBIN TIME: 13.1 SEC (ref 11.5–15.2)
RBC # BLD AUTO: 4.43 M/UL (ref 4.35–5.65)
SODIUM SERPL-SCNC: 142 MMOL/L (ref 136–145)
SP GR UR REFRACTOMETRY: 1.03 (ref 1–1.03)
TROPONIN-HIGH SENSITIVITY: 10 NG/L (ref 0–78)
UROBILINOGEN UR QL STRIP.AUTO: 0.2 EU/DL (ref 0.2–1)
WBC # BLD AUTO: 6.3 K/UL (ref 4.6–13.2)

## 2022-08-31 PROCEDURE — 85610 PROTHROMBIN TIME: CPT

## 2022-08-31 PROCEDURE — 71045 X-RAY EXAM CHEST 1 VIEW: CPT

## 2022-08-31 PROCEDURE — 83735 ASSAY OF MAGNESIUM: CPT

## 2022-08-31 PROCEDURE — 74011250636 HC RX REV CODE- 250/636: Performed by: EMERGENCY MEDICINE

## 2022-08-31 PROCEDURE — 93005 ELECTROCARDIOGRAM TRACING: CPT

## 2022-08-31 PROCEDURE — 99285 EMERGENCY DEPT VISIT HI MDM: CPT

## 2022-08-31 PROCEDURE — 85025 COMPLETE CBC W/AUTO DIFF WBC: CPT

## 2022-08-31 PROCEDURE — 80053 COMPREHEN METABOLIC PANEL: CPT

## 2022-08-31 PROCEDURE — 84484 ASSAY OF TROPONIN QUANT: CPT

## 2022-08-31 PROCEDURE — 74011636637 HC RX REV CODE- 636/637: Performed by: EMERGENCY MEDICINE

## 2022-08-31 PROCEDURE — 85730 THROMBOPLASTIN TIME PARTIAL: CPT

## 2022-08-31 PROCEDURE — 81003 URINALYSIS AUTO W/O SCOPE: CPT

## 2022-08-31 PROCEDURE — 70450 CT HEAD/BRAIN W/O DYE: CPT

## 2022-08-31 PROCEDURE — 82962 GLUCOSE BLOOD TEST: CPT

## 2022-08-31 PROCEDURE — 83036 HEMOGLOBIN GLYCOSYLATED A1C: CPT

## 2022-08-31 RX ORDER — INSULIN LISPRO 100 [IU]/ML
INJECTION, SOLUTION INTRAVENOUS; SUBCUTANEOUS
Status: DISCONTINUED | OUTPATIENT
Start: 2022-08-31 | End: 2022-08-31 | Stop reason: HOSPADM

## 2022-08-31 RX ORDER — MAGNESIUM SULFATE 100 %
4 CRYSTALS MISCELLANEOUS AS NEEDED
Status: DISCONTINUED | OUTPATIENT
Start: 2022-08-31 | End: 2022-08-31 | Stop reason: HOSPADM

## 2022-08-31 RX ORDER — DEXTROSE MONOHYDRATE 100 MG/ML
0-250 INJECTION, SOLUTION INTRAVENOUS AS NEEDED
Status: DISCONTINUED | OUTPATIENT
Start: 2022-08-31 | End: 2022-08-31 | Stop reason: HOSPADM

## 2022-08-31 RX ADMIN — INSULIN LISPRO 2 UNITS: 100 INJECTION, SOLUTION INTRAVENOUS; SUBCUTANEOUS at 18:41

## 2022-08-31 RX ADMIN — SODIUM CHLORIDE 1000 ML: 900 INJECTION, SOLUTION INTRAVENOUS at 18:15

## 2022-08-31 NOTE — ED NOTES
Assumed care of patient, A&Ox4, Resp even and unlabored, NAD noted or indicated. Pt presents with complaints of dysphagia that comes and goes, denies nausea, vomiting, SOB. Pt appears well. At baseline and self sufficient. Pt is able to ambulate with a steady gait. Pt connected to monitor, MD CAROLIN at bedside. This RN to continue to monitor and maintain safety.     Past Medical History:   Diagnosis Date    CAD (coronary artery disease)     Aortic Stenosis    Carotid artery stenosis     Dementia (HCC)     Diabetes (HCC)     Glaucoma     High cholesterol     Hypertension     Ill-defined condition     Bilateral Carotid Artery Stenosis       Past Surgical History:   Procedure Laterality Date    [facility] CARDIAC SURG PROCEDURE UNLIST      bypass

## 2022-08-31 NOTE — ED PROVIDER NOTES
EMERGENCY DEPARTMENT HISTORY AND PHYSICAL EXAM    6:19 PM      Date: 8/31/2022  Patient Name: Tona Stephens    History of Presenting Illness     Chief Complaint   Patient presents with    Dysphagia         History Provided By: Patient  Location/Duration/Severity/Modifying factors   The patient is a 68-year-old male with a history of dementia, diabetes, hypertension, who presents emergency department after having episode of dry mouth was when he could not speak. The patient has had intermittent episodes of difficulty speaking and overall fatigue according to the patient's son who provides majority the history. The patient has good days and bad days according to family and today he woke up and was doing well and then went to [facility] with his wife. After thymectomy Dr. Jarred Klein with his wife the patient complained of very dry mouth and difficulty speaking. Patient's son was 39 minutes away and ultimately go to the hospital to see how he is doing and see if there is any intervention is needed and can get there so asked that EMS be called. In the emergency department the patient says he is feeling better now however said that he had a difficult time speaking his is mouth is very dry. The patient denies any new numbness or tingling or difficulty getting the words out just said his mouth is so dry he mechanically could not speak. The patient denies having any extra sugars at lunch. Patient and wife deny any other aggravating or alleviating factors, fevers, chills, dysuria, or cough. The patient is a social drinker, denies smoking, no drug use. The patient retired from the [employer].       PCP: Lupe Bettencourt MD    Current Facility-Administered Medications   Medication Dose Route Frequency Provider Last Rate Last Admin    sodium chloride 0.9 % bolus infusion 1,000 mL  1,000 mL IntraVENous ONCE Bree STEVENS MD 1,000 mL/hr at 08/31/22 1815 1,000 mL at 08/31/22 1815    insulin lispro (HUMALOG) injection   SubCUTAneous TIDAC Yo Love MD        glucose chewable tablet 16 g  4 Tablet Oral PRN Yo Love MD        glucagon (GLUCAGEN) injection 1 mg  1 mg IntraMUSCular PRN Yo Love MD        dextrose 10% infusion 0-250 mL  0-250 mL IntraVENous PRN Yo Love MD         Current Outpatient Medications   Medication Sig Dispense Refill    Zinc Mth/Copper/Saw Palm/Gnsg (PROSTATE HEALTH FORMULA PO) Take  by mouth.      empagliflozin (Jardiance) 25 mg tablet Take 25 mg by mouth daily. metFORMIN (GLUCOPHAGE) 500 mg tablet Take 500 mg by mouth daily (with breakfast). glimepiride (AMARYL) 4 mg tablet Take 4 mg by mouth every morning. metoprolol tartrate (LOPRESSOR) 25 mg tablet Take 0.5 Tabs by mouth two (2) times a day. 30 Tab 0    glucose blood VI test strips (Accu-Chek Tracee Plus test strp) strip       Accu-Chek Tracee Plus test strp strip       clopidogreL (PLAVIX) 75 mg tab Take 75 mg by mouth daily. donepeziL (ARICEPT) 10 mg tablet Take 10 mg by mouth nightly. lancets (Accu-Chek Softclix Lancets) misc       aspirin delayed-release 81 mg tablet Take 81 mg by mouth daily. ezetimibe-simvastatin (VYTORIN 10/40) 10-40 mg per tablet Take 1 Tab by mouth nightly. FIBER CHOICE PO Take 1 Tab by mouth daily. MULTIVITAMIN W/IRON, MINERALS (MULTIVITAMIN AND MINERALS PO) Take 1 Tab by mouth daily.       OTHER,NON-FORMULARY, Indications: Move Free joint care         Past History     Past Medical History:  Past Medical History:   Diagnosis Date    CAD (coronary artery disease)     Aortic Stenosis    Carotid artery stenosis     Dementia (HCC)     Diabetes (HCC)     Glaucoma     High cholesterol     Hypertension     Ill-defined condition     Bilateral Carotid Artery Stenosis       Past Surgical History:  Past Surgical History:   Procedure Laterality Date    NE CARDIAC SURG PROCEDURE UNLIST      bypass       Family History:  Family History Problem Relation Age of Onset    Diabetes Other        Social History:  Social History     Tobacco Use    Smoking status: Never    Smokeless tobacco: Never   Substance Use Topics    Alcohol use: No    Drug use: No       Allergies:  No Known Allergies      Review of Systems       Review of Systems   Constitutional:  Positive for fatigue. Negative for chills, fever and unexpected weight change. HENT:  Negative for congestion and rhinorrhea. Respiratory:  Negative for chest tightness and shortness of breath. Cardiovascular:  Negative for chest pain, palpitations and leg swelling. Gastrointestinal:  Negative for abdominal pain, nausea and vomiting. Genitourinary:  Negative for dysuria. Musculoskeletal:  Negative for back pain. Skin:  Negative for rash. Neurological:  Positive for speech difficulty. Negative for dizziness and weakness. Psychiatric/Behavioral:  The patient is not nervous/anxious. All other systems reviewed and are negative. Physical Exam   Visit Vitals  /67   Pulse 73   Temp 97.9 °F (36.6 °C)   Resp 19   Ht 5' 5\" (1.651 m)   Wt 78.9 kg (174 lb)   SpO2 93%   BMI 28.96 kg/m²         Physical Exam  Vitals and nursing note reviewed. Constitutional:       General: He is not in acute distress. Appearance: He is well-developed. Comments: Speech is clear, moving all 4 extremities well   HENT:      Head: Normocephalic and atraumatic. Right Ear: External ear normal.      Left Ear: External ear normal.      Nose: Nose normal.   Eyes:      General: No scleral icterus. Conjunctiva/sclera: Conjunctivae normal.      Pupils: Pupils are equal, round, and reactive to light. Neck:      Thyroid: No thyromegaly. Vascular: No JVD. Trachea: No tracheal deviation. Cardiovascular:      Rate and Rhythm: Normal rate and regular rhythm. Heart sounds: Normal heart sounds. No murmur heard. No friction rub. No gallop.    Pulmonary:      Effort: Pulmonary effort is normal.      Breath sounds: Normal breath sounds. Chest:      Chest wall: No tenderness. Abdominal:      General: Bowel sounds are normal. There is no distension. Palpations: Abdomen is soft. Tenderness: There is no abdominal tenderness. There is no guarding or rebound. Musculoskeletal:         General: No tenderness. Normal range of motion. Cervical back: Normal range of motion and neck supple. Lymphadenopathy:      Cervical: No cervical adenopathy. Skin:     General: Skin is warm and dry. Neurological:      Mental Status: He is alert and oriented to person, place, and time. Cranial Nerves: No cranial nerve deficit. Coordination: Coordination normal.      Comments: Symmetric strength, no arm or leg drift, gait not observed, no sensory loss   Psychiatric:         Behavior: Behavior normal.         Thought Content: Thought content normal.         Judgment: Judgment normal.      Comments: Supportive and insightful family at the bedside         Diagnostic Study Results     Labs -  Recent Results (from the past 12 hour(s))   CBC WITH AUTOMATED DIFF    Collection Time: 08/31/22  4:10 PM   Result Value Ref Range    WBC 6.3 4.6 - 13.2 K/uL    RBC 4.43 4.35 - 5.65 M/uL    HGB 12.9 (L) 13.0 - 16.0 g/dL    HCT 39.0 36.0 - 48.0 %    MCV 88.0 78.0 - 100.0 FL    MCH 29.1 24.0 - 34.0 PG    MCHC 33.1 31.0 - 37.0 g/dL    RDW 13.7 11.6 - 14.5 %    PLATELET 076 257 - 243 K/uL    MPV 10.3 9.2 - 11.8 FL    NRBC 0.0 0  WBC    ABSOLUTE NRBC 0.00 0.00 - 0.01 K/uL    NEUTROPHILS 72 40 - 73 %    LYMPHOCYTES 17 (L) 21 - 52 %    MONOCYTES 8 3 - 10 %    EOSINOPHILS 2 0 - 5 %    BASOPHILS 1 0 - 2 %    IMMATURE GRANULOCYTES 0 0.0 - 0.5 %    ABS. NEUTROPHILS 4.5 1.8 - 8.0 K/UL    ABS. LYMPHOCYTES 1.1 0.9 - 3.6 K/UL    ABS. MONOCYTES 0.5 0.05 - 1.2 K/UL    ABS. EOSINOPHILS 0.1 0.0 - 0.4 K/UL    ABS. BASOPHILS 0.0 0.0 - 0.1 K/UL    ABS. IMM.  GRANS. 0.0 0.00 - 0.04 K/UL    DF AUTOMATED METABOLIC PANEL, COMPREHENSIVE    Collection Time: 08/31/22  4:10 PM   Result Value Ref Range    Sodium 142 136 - 145 mmol/L    Potassium 3.8 3.5 - 5.5 mmol/L    Chloride 108 100 - 111 mmol/L    CO2 23 21 - 32 mmol/L    Anion gap 11 3.0 - 18 mmol/L    Glucose 243 (H) 74 - 99 mg/dL    BUN 15 7.0 - 18 MG/DL    Creatinine 1.71 (H) 0.6 - 1.3 MG/DL    BUN/Creatinine ratio 9 (L) 12 - 20      GFR est AA 47 (L) >60 ml/min/1.73m2    GFR est non-AA 39 (L) >60 ml/min/1.73m2    Calcium 9.3 8.5 - 10.1 MG/DL    Bilirubin, total 0.4 0.2 - 1.0 MG/DL    ALT (SGPT) 35 16 - 61 U/L    AST (SGOT) 39 (H) 10 - 38 U/L    Alk.  phosphatase 83 45 - 117 U/L    Protein, total 6.9 6.4 - 8.2 g/dL    Albumin 3.7 3.4 - 5.0 g/dL    Globulin 3.2 2.0 - 4.0 g/dL    A-G Ratio 1.2 0.8 - 1.7     PROTHROMBIN TIME + INR    Collection Time: 08/31/22  4:10 PM   Result Value Ref Range    Prothrombin time 13.1 11.5 - 15.2 sec    INR 1.0 0.8 - 1.2     PTT    Collection Time: 08/31/22  4:10 PM   Result Value Ref Range    aPTT 26.2 23.0 - 36.4 SEC   MAGNESIUM    Collection Time: 08/31/22  4:10 PM   Result Value Ref Range    Magnesium 2.1 1.6 - 2.6 mg/dL   EKG, 12 LEAD, INITIAL    Collection Time: 08/31/22  4:10 PM   Result Value Ref Range    Ventricular Rate 73 BPM    Atrial Rate 73 BPM    P-R Interval 280 ms    QRS Duration 86 ms    Q-T Interval 440 ms    QTC Calculation (Bezet) 484 ms    Calculated P Axis 42 degrees    Calculated R Axis -23 degrees    Calculated T Axis 59 degrees    Diagnosis       Sinus rhythm with 1st degree AV block  Nonspecific T wave abnormality  Prolonged QT  Abnormal ECG  When compared with ECG of 21-AUG-2022 00:50,  No significant change was found     URINALYSIS W/ RFLX MICROSCOPIC    Collection Time: 08/31/22  5:10 PM   Result Value Ref Range    Color YELLOW      Appearance CLEAR      Specific gravity 1.028 1.005 - 1.030      pH (UA) 5.5 5.0 - 8.0      Protein Negative NEG mg/dL    Glucose >1,000 (A) NEG mg/dL    Ketone Negative NEG mg/dL    Bilirubin Negative NEG      Blood Negative NEG      Urobilinogen 0.2 0.2 - 1.0 EU/dL    Nitrites Negative NEG      Leukocyte Esterase Negative NEG         Radiologic Studies -   CT HEAD WO CONT   Final Result   1. No acute process is identified. 2.  Age-related involutional and microvascular ischemic changes. XR CHEST SNGL V    (Results Pending)         Medical Decision Making   I am the first provider for this patient. I reviewed the vital signs, available nursing notes, past medical history, past surgical history, family history and social history. Vital Signs-Reviewed the patient's vital signs. EKG: Sinus rhythm at 73, first-degree AV block, prolonged QTC, no STEMI, interpreted by me    Records Reviewed: Nursing Notes, Old Medical Records, Previous Radiology Studies, and Previous Laboratory Studies (Time of Review: 6:19 PM)    ED Course: Progress Notes, Reevaluation, and Consults: The patient's blood glucose is elevated and suspect with the dry mouth that he may be having episodes of hypoglycemia that are not being controlled with his oral medications. We will give small doses of insulin using sliding scale coverage and follow his cardiac labs. 6:34 PM : Pt care transferred to Dr. Merissa Berrios  ,ED provider. History of patient complaint(s), available diagnostic reports and current treatment plan has been discussed thoroughly. Bedside rounding on patient occured : yes . Intended disposition of patient : TBD  Pending diagnostics reports and/or labs (please list):     Dr. Isaura Lanier assistance in completion of this plan is greatly appreciated but it should be noted that I will be the provider of record for this patient. Provider Notes (Medical Decision Making):   MDM  Number of Diagnoses or Management Options  Diagnosis management comments:  The patient is a 78-year-old male with a history of diabetes, hypertension, heart disease with bypass surgery, glaucoma, carotid stenosis, who presents emergency department with a complaint of difficulty speaking that began after going to [employer]. After long talk with the patient and family discerning his symptoms related to a dry mouth and not related to difficulties speaking. The patient is nonfocal at this time however we will CT the patient's brain, follow         Procedures          Diagnosis     Clinical Impression:   1. Dry mouth    2. Hyperglycemia        Disposition: TBD    Follow-up Information    None          Patient's Medications   Start Taking    No medications on file   Continue Taking    ACCU-CHEK ROBERT PLUS TEST STRP STRIP        ASPIRIN DELAYED-RELEASE 81 MG TABLET    Take 81 mg by mouth daily. CLOPIDOGREL (PLAVIX) 75 MG TAB    Take 75 mg by mouth daily. DONEPEZIL (ARICEPT) 10 MG TABLET    Take 10 mg by mouth nightly. EMPAGLIFLOZIN (JARDIANCE) 25 MG TABLET    Take 25 mg by mouth daily. EZETIMIBE-SIMVASTATIN (VYTORIN 10/40) 10-40 MG PER TABLET    Take 1 Tab by mouth nightly. FIBER CHOICE PO    Take 1 Tab by mouth daily. GLIMEPIRIDE (AMARYL) 4 MG TABLET    Take 4 mg by mouth every morning. GLUCOSE BLOOD VI TEST STRIPS (ACCU-CHEK ROBERT PLUS TEST STRP) STRIP        LANCETS (ACCU-CHEK SOFTCLIX LANCETS) MISC        METFORMIN (GLUCOPHAGE) 500 MG TABLET    Take 500 mg by mouth daily (with breakfast). METOPROLOL TARTRATE (LOPRESSOR) 25 MG TABLET    Take 0.5 Tabs by mouth two (2) times a day. MULTIVITAMIN W/IRON, MINERALS (MULTIVITAMIN AND MINERALS PO)    Take 1 Tab by mouth daily. OTHER,NON-FORMULARY,    Indications: Move Free joint care    ZINC MTH/COPPER/SAW PALM/GNSG (PROSTATE HEALTH FORMULA PO)    Take  by mouth. These Medications have changed    No medications on file   Stop Taking    No medications on file     Disclaimer: Sections of this note are dictated using utilizing voice recognition software. Minor typographical errors may be present.  If questions arise, please do not hesitate to contact me or call our department.

## 2022-09-01 LAB
ATRIAL RATE: 73 BPM
CALCULATED P AXIS, ECG09: 42 DEGREES
CALCULATED R AXIS, ECG10: -23 DEGREES
CALCULATED T AXIS, ECG11: 59 DEGREES
DIAGNOSIS, 93000: NORMAL
P-R INTERVAL, ECG05: 280 MS
Q-T INTERVAL, ECG07: 440 MS
QRS DURATION, ECG06: 86 MS
QTC CALCULATION (BEZET), ECG08: 484 MS
VENTRICULAR RATE, ECG03: 73 BPM

## 2022-09-01 NOTE — ED NOTES
Discharge instructions reviewed w/ son. Pt dressed self. D/v iv site , hemostasis achieved. Pt ambulated out of er independently.

## 2023-01-01 ENCOUNTER — HOME CARE VISIT (OUTPATIENT)
Age: 78
End: 2023-01-01
Payer: MEDICARE

## 2023-01-01 ENCOUNTER — HOSPICE ADMISSION (OUTPATIENT)
Age: 78
End: 2023-01-01
Payer: MEDICARE

## 2023-01-01 ENCOUNTER — APPOINTMENT (OUTPATIENT)
Facility: HOSPITAL | Age: 78
DRG: 641 | End: 2023-01-01
Payer: MEDICARE

## 2023-01-01 ENCOUNTER — TELEPHONE (OUTPATIENT)
Age: 78
End: 2023-01-01

## 2023-01-01 ENCOUNTER — HOSPITAL ENCOUNTER (INPATIENT)
Facility: HOSPITAL | Age: 78
LOS: 19 days | Discharge: HOSPICE | DRG: 641 | End: 2023-11-07
Attending: EMERGENCY MEDICINE
Payer: MEDICARE

## 2023-01-01 VITALS
SYSTOLIC BLOOD PRESSURE: 102 MMHG | HEART RATE: 111 BPM | DIASTOLIC BLOOD PRESSURE: 47 MMHG | OXYGEN SATURATION: 70 % | RESPIRATION RATE: 20 BRPM

## 2023-01-01 VITALS
TEMPERATURE: 98.3 F | RESPIRATION RATE: 20 BRPM | HEART RATE: 78 BPM | SYSTOLIC BLOOD PRESSURE: 128 MMHG | DIASTOLIC BLOOD PRESSURE: 78 MMHG | OXYGEN SATURATION: 83 %

## 2023-01-01 VITALS — RESPIRATION RATE: 18 BRPM | HEART RATE: 100 BPM | TEMPERATURE: 102 F

## 2023-01-01 VITALS
RESPIRATION RATE: 16 BRPM | OXYGEN SATURATION: 92 % | DIASTOLIC BLOOD PRESSURE: 60 MMHG | SYSTOLIC BLOOD PRESSURE: 136 MMHG | HEART RATE: 80 BPM | TEMPERATURE: 98.2 F

## 2023-01-01 VITALS
TEMPERATURE: 98 F | WEIGHT: 151 LBS | HEIGHT: 65 IN | SYSTOLIC BLOOD PRESSURE: 104 MMHG | RESPIRATION RATE: 15 BRPM | DIASTOLIC BLOOD PRESSURE: 58 MMHG | BODY MASS INDEX: 25.16 KG/M2 | HEART RATE: 68 BPM | OXYGEN SATURATION: 93 %

## 2023-01-01 VITALS
BODY MASS INDEX: 25.27 KG/M2 | OXYGEN SATURATION: 97 % | RESPIRATION RATE: 20 BRPM | TEMPERATURE: 97.3 F | HEIGHT: 65 IN | SYSTOLIC BLOOD PRESSURE: 134 MMHG | WEIGHT: 151.68 LBS | DIASTOLIC BLOOD PRESSURE: 67 MMHG | HEART RATE: 95 BPM

## 2023-01-01 DIAGNOSIS — R41.82 ALTERED MENTAL STATUS, UNSPECIFIED ALTERED MENTAL STATUS TYPE: ICD-10-CM

## 2023-01-01 DIAGNOSIS — Z51.5 HOSPICE CARE PATIENT: ICD-10-CM

## 2023-01-01 DIAGNOSIS — E86.0 DEHYDRATION: ICD-10-CM

## 2023-01-01 DIAGNOSIS — F03.90 DEMENTIA, UNSPECIFIED DEMENTIA SEVERITY, UNSPECIFIED DEMENTIA TYPE, UNSPECIFIED WHETHER BEHAVIORAL, PSYCHOTIC, OR MOOD DISTURBANCE OR ANXIETY (HCC): ICD-10-CM

## 2023-01-01 DIAGNOSIS — K80.20 GALLSTONES: ICD-10-CM

## 2023-01-01 DIAGNOSIS — R55 SYNCOPE: ICD-10-CM

## 2023-01-01 DIAGNOSIS — R10.84 GENERALIZED ABDOMINAL PAIN: Primary | ICD-10-CM

## 2023-01-01 LAB
ALBUMIN SERPL-MCNC: 3.7 G/DL (ref 3.4–5)
ALBUMIN/GLOB SERPL: 1.2 (ref 0.8–1.7)
ALP SERPL-CCNC: 136 U/L (ref 45–117)
ALT SERPL-CCNC: 32 U/L (ref 16–61)
ANION GAP SERPL CALC-SCNC: 2 MMOL/L (ref 3–18)
ANION GAP SERPL CALC-SCNC: 3 MMOL/L (ref 3–18)
ANION GAP SERPL CALC-SCNC: 4 MMOL/L (ref 3–18)
ANION GAP SERPL CALC-SCNC: 5 MMOL/L (ref 3–18)
ANION GAP SERPL CALC-SCNC: 5 MMOL/L (ref 3–18)
ANION GAP SERPL CALC-SCNC: 6 MMOL/L (ref 3–18)
ANION GAP SERPL CALC-SCNC: 7 MMOL/L (ref 3–18)
ANION GAP SERPL CALC-SCNC: 7 MMOL/L (ref 3–18)
ANION GAP SERPL CALC-SCNC: 8 MMOL/L (ref 3–18)
ANION GAP SERPL CALC-SCNC: 9 MMOL/L (ref 3–18)
APPEARANCE UR: CLEAR
APPEARANCE UR: CLEAR
AST SERPL-CCNC: 21 U/L (ref 10–38)
BACTERIA SPEC CULT: NORMAL
BACTERIA URNS QL MICRO: NEGATIVE /HPF
BASOPHILS # BLD: 0 K/UL (ref 0–0.1)
BASOPHILS # BLD: 0.1 K/UL (ref 0–0.1)
BASOPHILS NFR BLD: 1 % (ref 0–2)
BILIRUB SERPL-MCNC: 0.4 MG/DL (ref 0.2–1)
BILIRUB UR QL: NEGATIVE
BILIRUB UR QL: NEGATIVE
BUN SERPL-MCNC: 12 MG/DL (ref 7–18)
BUN SERPL-MCNC: 13 MG/DL (ref 7–18)
BUN SERPL-MCNC: 15 MG/DL (ref 7–18)
BUN SERPL-MCNC: 18 MG/DL (ref 7–18)
BUN SERPL-MCNC: 22 MG/DL (ref 7–18)
BUN SERPL-MCNC: 23 MG/DL (ref 7–18)
BUN SERPL-MCNC: 26 MG/DL (ref 7–18)
BUN SERPL-MCNC: 30 MG/DL (ref 7–18)
BUN SERPL-MCNC: 31 MG/DL (ref 7–18)
BUN SERPL-MCNC: 32 MG/DL (ref 7–18)
BUN/CREAT SERPL: 10 (ref 12–20)
BUN/CREAT SERPL: 10 (ref 12–20)
BUN/CREAT SERPL: 14 (ref 12–20)
BUN/CREAT SERPL: 15 (ref 12–20)
BUN/CREAT SERPL: 17 (ref 12–20)
BUN/CREAT SERPL: 19 (ref 12–20)
BUN/CREAT SERPL: 21 (ref 12–20)
BUN/CREAT SERPL: 9 (ref 12–20)
CALCIUM SERPL-MCNC: 8.3 MG/DL (ref 8.5–10.1)
CALCIUM SERPL-MCNC: 8.7 MG/DL (ref 8.5–10.1)
CALCIUM SERPL-MCNC: 8.7 MG/DL (ref 8.5–10.1)
CALCIUM SERPL-MCNC: 8.8 MG/DL (ref 8.5–10.1)
CALCIUM SERPL-MCNC: 8.9 MG/DL (ref 8.5–10.1)
CALCIUM SERPL-MCNC: 8.9 MG/DL (ref 8.5–10.1)
CALCIUM SERPL-MCNC: 9 MG/DL (ref 8.5–10.1)
CALCIUM SERPL-MCNC: 9.4 MG/DL (ref 8.5–10.1)
CALCIUM SERPL-MCNC: 9.4 MG/DL (ref 8.5–10.1)
CALCIUM SERPL-MCNC: 9.8 MG/DL (ref 8.5–10.1)
CHLORIDE SERPL-SCNC: 104 MMOL/L (ref 100–111)
CHLORIDE SERPL-SCNC: 105 MMOL/L (ref 100–111)
CHLORIDE SERPL-SCNC: 108 MMOL/L (ref 100–111)
CHLORIDE SERPL-SCNC: 108 MMOL/L (ref 100–111)
CHLORIDE SERPL-SCNC: 109 MMOL/L (ref 100–111)
CHLORIDE SERPL-SCNC: 110 MMOL/L (ref 100–111)
CHLORIDE SERPL-SCNC: 112 MMOL/L (ref 100–111)
CO2 SERPL-SCNC: 22 MMOL/L (ref 21–32)
CO2 SERPL-SCNC: 24 MMOL/L (ref 21–32)
CO2 SERPL-SCNC: 25 MMOL/L (ref 21–32)
CO2 SERPL-SCNC: 25 MMOL/L (ref 21–32)
CO2 SERPL-SCNC: 26 MMOL/L (ref 21–32)
CO2 SERPL-SCNC: 27 MMOL/L (ref 21–32)
CO2 SERPL-SCNC: 27 MMOL/L (ref 21–32)
CO2 SERPL-SCNC: 28 MMOL/L (ref 21–32)
CO2 SERPL-SCNC: 29 MMOL/L (ref 21–32)
CO2 SERPL-SCNC: 29 MMOL/L (ref 21–32)
COLOR UR: YELLOW
COLOR UR: YELLOW
CREAT SERPL-MCNC: 1.27 MG/DL (ref 0.6–1.3)
CREAT SERPL-MCNC: 1.29 MG/DL (ref 0.6–1.3)
CREAT SERPL-MCNC: 1.32 MG/DL (ref 0.6–1.3)
CREAT SERPL-MCNC: 1.33 MG/DL (ref 0.6–1.3)
CREAT SERPL-MCNC: 1.38 MG/DL (ref 0.6–1.3)
CREAT SERPL-MCNC: 1.4 MG/DL (ref 0.6–1.3)
CREAT SERPL-MCNC: 1.52 MG/DL (ref 0.6–1.3)
CREAT SERPL-MCNC: 1.67 MG/DL (ref 0.6–1.3)
CREAT SERPL-MCNC: 1.72 MG/DL (ref 0.6–1.3)
CREAT SERPL-MCNC: 1.86 MG/DL (ref 0.6–1.3)
DIFFERENTIAL METHOD BLD: ABNORMAL
ECHO AO ASC DIAM: 3 CM
ECHO AO ASCENDING AORTA INDEX: 1.6 CM/M2
ECHO AO ROOT DIAM: 2.6 CM
ECHO AO ROOT INDEX: 1.38 CM/M2
ECHO AV AREA PEAK VELOCITY: 1.9 CM2
ECHO AV AREA VTI: 1.9 CM2
ECHO AV AREA/BSA PEAK VELOCITY: 1 CM2/M2
ECHO AV AREA/BSA VTI: 1 CM2/M2
ECHO AV MEAN GRADIENT: 3 MMHG
ECHO AV MEAN VELOCITY: 0.8 M/S
ECHO AV PEAK GRADIENT: 5 MMHG
ECHO AV PEAK VELOCITY: 1.1 M/S
ECHO AV VELOCITY RATIO: 0.73
ECHO AV VTI: 21.4 CM
ECHO BSA: 1.92 M2
ECHO BSA: 1.92 M2
ECHO LA VOL A-L A2C: 54 ML (ref 18–58)
ECHO LA VOL A-L A4C: 21 ML (ref 18–58)
ECHO LA VOL BP: 35 ML (ref 18–58)
ECHO LA VOL/BSA BIPLANE: 19 ML/M2 (ref 16–34)
ECHO LA VOLUME AREA LENGTH: 38 ML
ECHO LA VOLUME INDEX A-L A2C: 29 ML/M2 (ref 16–34)
ECHO LA VOLUME INDEX A-L A4C: 11 ML/M2 (ref 16–34)
ECHO LA VOLUME INDEX AREA LENGTH: 20 ML/M2 (ref 16–34)
ECHO LV E' LATERAL VELOCITY: 7 CM/S
ECHO LV E' SEPTAL VELOCITY: 6 CM/S
ECHO LVOT AREA: 2.5 CM2
ECHO LVOT AV VTI INDEX: 0.77
ECHO LVOT DIAM: 1.8 CM
ECHO LVOT MEAN GRADIENT: 1 MMHG
ECHO LVOT PEAK GRADIENT: 3 MMHG
ECHO LVOT PEAK VELOCITY: 0.8 M/S
ECHO LVOT STROKE VOLUME INDEX: 22.2 ML/M2
ECHO LVOT SV: 41.7 ML
ECHO LVOT VTI: 16.4 CM
ECHO MV A VELOCITY: 0.96 M/S
ECHO MV E DECELERATION TIME (DT): 314.1 MS
ECHO MV E VELOCITY: 0.69 M/S
ECHO MV E/A RATIO: 0.72
ECHO MV E/E' LATERAL: 9.86
ECHO MV E/E' RATIO (AVERAGED): 10.68
ECHO MV E/E' SEPTAL: 11.5
ECHO RV FREE WALL PEAK S': 7 CM/S
ECHO RV TAPSE: 1.2 CM (ref 1.7–?)
EKG ATRIAL RATE: 64 BPM
EKG ATRIAL RATE: 66 BPM
EKG ATRIAL RATE: 68 BPM
EKG ATRIAL RATE: 68 BPM
EKG ATRIAL RATE: 73 BPM
EKG ATRIAL RATE: 90 BPM
EKG DIAGNOSIS: NORMAL
EKG P AXIS: 14 DEGREES
EKG P AXIS: 24 DEGREES
EKG P AXIS: 36 DEGREES
EKG P AXIS: 39 DEGREES
EKG P AXIS: 48 DEGREES
EKG P-R INTERVAL: 212 MS
EKG P-R INTERVAL: 268 MS
EKG P-R INTERVAL: 270 MS
EKG P-R INTERVAL: 272 MS
EKG P-R INTERVAL: 280 MS
EKG P-R INTERVAL: 290 MS
EKG Q-T INTERVAL: 402 MS
EKG Q-T INTERVAL: 446 MS
EKG Q-T INTERVAL: 448 MS
EKG Q-T INTERVAL: 456 MS
EKG Q-T INTERVAL: 462 MS
EKG Q-T INTERVAL: 652 MS
EKG QRS DURATION: 80 MS
EKG QRS DURATION: 88 MS
EKG QRS DURATION: 90 MS
EKG QRS DURATION: 90 MS
EKG QRS DURATION: 92 MS
EKG QRS DURATION: 92 MS
EKG QTC CALCULATION (BAZETT): 469 MS
EKG QTC CALCULATION (BAZETT): 476 MS
EKG QTC CALCULATION (BAZETT): 484 MS
EKG QTC CALCULATION (BAZETT): 491 MS
EKG QTC CALCULATION (BAZETT): 491 MS
EKG QTC CALCULATION (BAZETT): 693 MS
EKG R AXIS: -30 DEGREES
EKG R AXIS: -37 DEGREES
EKG R AXIS: -38 DEGREES
EKG R AXIS: -39 DEGREES
EKG R AXIS: -45 DEGREES
EKG R AXIS: -45 DEGREES
EKG T AXIS: 105 DEGREES
EKG T AXIS: 108 DEGREES
EKG T AXIS: 12 DEGREES
EKG T AXIS: 16 DEGREES
EKG T AXIS: 39 DEGREES
EKG T AXIS: 66 DEGREES
EKG VENTRICULAR RATE: 64 BPM
EKG VENTRICULAR RATE: 66 BPM
EKG VENTRICULAR RATE: 68 BPM
EKG VENTRICULAR RATE: 68 BPM
EKG VENTRICULAR RATE: 73 BPM
EKG VENTRICULAR RATE: 90 BPM
EOSINOPHIL # BLD: 0.1 K/UL (ref 0–0.4)
EOSINOPHIL # BLD: 0.1 K/UL (ref 0–0.4)
EOSINOPHIL # BLD: 0.2 K/UL (ref 0–0.4)
EOSINOPHIL NFR BLD: 2 % (ref 0–5)
EOSINOPHIL NFR BLD: 2 % (ref 0–5)
EOSINOPHIL NFR BLD: 3 % (ref 0–5)
EPITH CASTS URNS QL MICRO: NEGATIVE /LPF (ref 0–5)
ERYTHROCYTE [DISTWIDTH] IN BLOOD BY AUTOMATED COUNT: 12.5 % (ref 11.6–14.5)
ERYTHROCYTE [DISTWIDTH] IN BLOOD BY AUTOMATED COUNT: 12.5 % (ref 11.6–14.5)
ERYTHROCYTE [DISTWIDTH] IN BLOOD BY AUTOMATED COUNT: 12.6 % (ref 11.6–14.5)
ERYTHROCYTE [DISTWIDTH] IN BLOOD BY AUTOMATED COUNT: 12.6 % (ref 11.6–14.5)
ERYTHROCYTE [DISTWIDTH] IN BLOOD BY AUTOMATED COUNT: 12.7 % (ref 11.6–14.5)
ERYTHROCYTE [DISTWIDTH] IN BLOOD BY AUTOMATED COUNT: 12.7 % (ref 11.6–14.5)
ERYTHROCYTE [DISTWIDTH] IN BLOOD BY AUTOMATED COUNT: 12.8 % (ref 11.6–14.5)
EST. AVERAGE GLUCOSE BLD GHB EST-MCNC: 237 MG/DL
GLOBULIN SER CALC-MCNC: 3.1 G/DL (ref 2–4)
GLUCOSE BLD STRIP.AUTO-MCNC: 109 MG/DL (ref 70–110)
GLUCOSE BLD STRIP.AUTO-MCNC: 111 MG/DL (ref 70–110)
GLUCOSE BLD STRIP.AUTO-MCNC: 119 MG/DL (ref 70–110)
GLUCOSE BLD STRIP.AUTO-MCNC: 120 MG/DL (ref 70–110)
GLUCOSE BLD STRIP.AUTO-MCNC: 122 MG/DL (ref 70–110)
GLUCOSE BLD STRIP.AUTO-MCNC: 126 MG/DL (ref 70–110)
GLUCOSE BLD STRIP.AUTO-MCNC: 128 MG/DL (ref 70–110)
GLUCOSE BLD STRIP.AUTO-MCNC: 129 MG/DL (ref 70–110)
GLUCOSE BLD STRIP.AUTO-MCNC: 130 MG/DL (ref 70–110)
GLUCOSE BLD STRIP.AUTO-MCNC: 131 MG/DL (ref 70–110)
GLUCOSE BLD STRIP.AUTO-MCNC: 131 MG/DL (ref 70–110)
GLUCOSE BLD STRIP.AUTO-MCNC: 134 MG/DL (ref 70–110)
GLUCOSE BLD STRIP.AUTO-MCNC: 134 MG/DL (ref 70–110)
GLUCOSE BLD STRIP.AUTO-MCNC: 135 MG/DL (ref 70–110)
GLUCOSE BLD STRIP.AUTO-MCNC: 135 MG/DL (ref 70–110)
GLUCOSE BLD STRIP.AUTO-MCNC: 136 MG/DL (ref 70–110)
GLUCOSE BLD STRIP.AUTO-MCNC: 137 MG/DL (ref 70–110)
GLUCOSE BLD STRIP.AUTO-MCNC: 137 MG/DL (ref 70–110)
GLUCOSE BLD STRIP.AUTO-MCNC: 138 MG/DL (ref 70–110)
GLUCOSE BLD STRIP.AUTO-MCNC: 138 MG/DL (ref 70–110)
GLUCOSE BLD STRIP.AUTO-MCNC: 139 MG/DL (ref 70–110)
GLUCOSE BLD STRIP.AUTO-MCNC: 140 MG/DL (ref 70–110)
GLUCOSE BLD STRIP.AUTO-MCNC: 140 MG/DL (ref 70–110)
GLUCOSE BLD STRIP.AUTO-MCNC: 142 MG/DL (ref 70–110)
GLUCOSE BLD STRIP.AUTO-MCNC: 145 MG/DL (ref 70–110)
GLUCOSE BLD STRIP.AUTO-MCNC: 146 MG/DL (ref 70–110)
GLUCOSE BLD STRIP.AUTO-MCNC: 148 MG/DL (ref 70–110)
GLUCOSE BLD STRIP.AUTO-MCNC: 148 MG/DL (ref 70–110)
GLUCOSE BLD STRIP.AUTO-MCNC: 153 MG/DL (ref 70–110)
GLUCOSE BLD STRIP.AUTO-MCNC: 154 MG/DL (ref 70–110)
GLUCOSE BLD STRIP.AUTO-MCNC: 154 MG/DL (ref 70–110)
GLUCOSE BLD STRIP.AUTO-MCNC: 155 MG/DL (ref 70–110)
GLUCOSE BLD STRIP.AUTO-MCNC: 155 MG/DL (ref 70–110)
GLUCOSE BLD STRIP.AUTO-MCNC: 156 MG/DL (ref 70–110)
GLUCOSE BLD STRIP.AUTO-MCNC: 158 MG/DL (ref 70–110)
GLUCOSE BLD STRIP.AUTO-MCNC: 162 MG/DL (ref 70–110)
GLUCOSE BLD STRIP.AUTO-MCNC: 164 MG/DL (ref 70–110)
GLUCOSE BLD STRIP.AUTO-MCNC: 164 MG/DL (ref 70–110)
GLUCOSE BLD STRIP.AUTO-MCNC: 167 MG/DL (ref 70–110)
GLUCOSE BLD STRIP.AUTO-MCNC: 168 MG/DL (ref 70–110)
GLUCOSE BLD STRIP.AUTO-MCNC: 169 MG/DL (ref 70–110)
GLUCOSE BLD STRIP.AUTO-MCNC: 169 MG/DL (ref 70–110)
GLUCOSE BLD STRIP.AUTO-MCNC: 170 MG/DL (ref 70–110)
GLUCOSE BLD STRIP.AUTO-MCNC: 170 MG/DL (ref 70–110)
GLUCOSE BLD STRIP.AUTO-MCNC: 174 MG/DL (ref 70–110)
GLUCOSE BLD STRIP.AUTO-MCNC: 178 MG/DL (ref 70–110)
GLUCOSE BLD STRIP.AUTO-MCNC: 181 MG/DL (ref 70–110)
GLUCOSE BLD STRIP.AUTO-MCNC: 182 MG/DL (ref 70–110)
GLUCOSE BLD STRIP.AUTO-MCNC: 184 MG/DL (ref 70–110)
GLUCOSE BLD STRIP.AUTO-MCNC: 187 MG/DL (ref 70–110)
GLUCOSE BLD STRIP.AUTO-MCNC: 192 MG/DL (ref 70–110)
GLUCOSE BLD STRIP.AUTO-MCNC: 193 MG/DL (ref 70–110)
GLUCOSE BLD STRIP.AUTO-MCNC: 194 MG/DL (ref 70–110)
GLUCOSE BLD STRIP.AUTO-MCNC: 194 MG/DL (ref 70–110)
GLUCOSE BLD STRIP.AUTO-MCNC: 197 MG/DL (ref 70–110)
GLUCOSE BLD STRIP.AUTO-MCNC: 198 MG/DL (ref 70–110)
GLUCOSE BLD STRIP.AUTO-MCNC: 204 MG/DL (ref 70–110)
GLUCOSE BLD STRIP.AUTO-MCNC: 208 MG/DL (ref 70–110)
GLUCOSE BLD STRIP.AUTO-MCNC: 214 MG/DL (ref 70–110)
GLUCOSE BLD STRIP.AUTO-MCNC: 216 MG/DL (ref 70–110)
GLUCOSE BLD STRIP.AUTO-MCNC: 217 MG/DL (ref 70–110)
GLUCOSE BLD STRIP.AUTO-MCNC: 223 MG/DL (ref 70–110)
GLUCOSE BLD STRIP.AUTO-MCNC: 224 MG/DL (ref 70–110)
GLUCOSE BLD STRIP.AUTO-MCNC: 230 MG/DL (ref 70–110)
GLUCOSE BLD STRIP.AUTO-MCNC: 242 MG/DL (ref 70–110)
GLUCOSE BLD STRIP.AUTO-MCNC: 244 MG/DL (ref 70–110)
GLUCOSE BLD STRIP.AUTO-MCNC: 251 MG/DL (ref 70–110)
GLUCOSE BLD STRIP.AUTO-MCNC: 257 MG/DL (ref 70–110)
GLUCOSE BLD STRIP.AUTO-MCNC: 263 MG/DL (ref 70–110)
GLUCOSE BLD STRIP.AUTO-MCNC: 266 MG/DL (ref 70–110)
GLUCOSE BLD STRIP.AUTO-MCNC: 284 MG/DL (ref 70–110)
GLUCOSE BLD STRIP.AUTO-MCNC: 291 MG/DL (ref 70–110)
GLUCOSE BLD STRIP.AUTO-MCNC: 308 MG/DL (ref 70–110)
GLUCOSE SERPL-MCNC: 124 MG/DL (ref 74–99)
GLUCOSE SERPL-MCNC: 124 MG/DL (ref 74–99)
GLUCOSE SERPL-MCNC: 144 MG/DL (ref 74–99)
GLUCOSE SERPL-MCNC: 145 MG/DL (ref 74–99)
GLUCOSE SERPL-MCNC: 154 MG/DL (ref 74–99)
GLUCOSE SERPL-MCNC: 158 MG/DL (ref 74–99)
GLUCOSE SERPL-MCNC: 164 MG/DL (ref 74–99)
GLUCOSE SERPL-MCNC: 172 MG/DL (ref 74–99)
GLUCOSE SERPL-MCNC: 181 MG/DL (ref 74–99)
GLUCOSE SERPL-MCNC: 319 MG/DL (ref 74–99)
GLUCOSE UR STRIP.AUTO-MCNC: >1000 MG/DL
GLUCOSE UR STRIP.AUTO-MCNC: NEGATIVE MG/DL
HBA1C MFR BLD: 9.9 % (ref 4.2–5.6)
HCT VFR BLD AUTO: 38 % (ref 36–48)
HCT VFR BLD AUTO: 39.1 % (ref 36–48)
HCT VFR BLD AUTO: 39.3 % (ref 36–48)
HCT VFR BLD AUTO: 40.7 % (ref 36–48)
HCT VFR BLD AUTO: 43.3 % (ref 36–48)
HCT VFR BLD AUTO: 45 % (ref 36–48)
HCT VFR BLD AUTO: 47.8 % (ref 36–48)
HGB BLD-MCNC: 12.5 G/DL (ref 13–16)
HGB BLD-MCNC: 12.8 G/DL (ref 13–16)
HGB BLD-MCNC: 12.8 G/DL (ref 13–16)
HGB BLD-MCNC: 13.4 G/DL (ref 13–16)
HGB BLD-MCNC: 14.2 G/DL (ref 13–16)
HGB BLD-MCNC: 14.5 G/DL (ref 13–16)
HGB BLD-MCNC: 15.8 G/DL (ref 13–16)
HGB UR QL STRIP: NEGATIVE
HGB UR QL STRIP: NEGATIVE
IMM GRANULOCYTES # BLD AUTO: 0 K/UL (ref 0–0.04)
IMM GRANULOCYTES NFR BLD AUTO: 0 % (ref 0–0.5)
KETONES UR QL STRIP.AUTO: 15 MG/DL
KETONES UR QL STRIP.AUTO: NEGATIVE MG/DL
LEUKOCYTE ESTERASE UR QL STRIP.AUTO: NEGATIVE
LEUKOCYTE ESTERASE UR QL STRIP.AUTO: NEGATIVE
LIPASE SERPL-CCNC: 321 U/L (ref 73–393)
LYMPHOCYTES # BLD: 1.1 K/UL (ref 0.9–3.6)
LYMPHOCYTES # BLD: 1.1 K/UL (ref 0.9–3.6)
LYMPHOCYTES # BLD: 1.2 K/UL (ref 0.9–3.6)
LYMPHOCYTES # BLD: 1.2 K/UL (ref 0.9–3.6)
LYMPHOCYTES # BLD: 1.3 K/UL (ref 0.9–3.6)
LYMPHOCYTES # BLD: 1.4 K/UL (ref 0.9–3.6)
LYMPHOCYTES # BLD: 1.6 K/UL (ref 0.9–3.6)
LYMPHOCYTES NFR BLD: 21 % (ref 21–52)
LYMPHOCYTES NFR BLD: 21 % (ref 21–52)
LYMPHOCYTES NFR BLD: 22 % (ref 21–52)
LYMPHOCYTES NFR BLD: 22 % (ref 21–52)
LYMPHOCYTES NFR BLD: 23 % (ref 21–52)
LYMPHOCYTES NFR BLD: 23 % (ref 21–52)
LYMPHOCYTES NFR BLD: 24 % (ref 21–52)
MAGNESIUM SERPL-MCNC: 1.7 MG/DL (ref 1.6–2.6)
MAGNESIUM SERPL-MCNC: 1.9 MG/DL (ref 1.6–2.6)
MAGNESIUM SERPL-MCNC: 1.9 MG/DL (ref 1.6–2.6)
MCH RBC QN AUTO: 28.4 PG (ref 24–34)
MCH RBC QN AUTO: 28.9 PG (ref 24–34)
MCH RBC QN AUTO: 29 PG (ref 24–34)
MCH RBC QN AUTO: 29 PG (ref 24–34)
MCH RBC QN AUTO: 29.4 PG (ref 24–34)
MCH RBC QN AUTO: 29.5 PG (ref 24–34)
MCH RBC QN AUTO: 29.6 PG (ref 24–34)
MCHC RBC AUTO-ENTMCNC: 32.2 G/DL (ref 31–37)
MCHC RBC AUTO-ENTMCNC: 32.6 G/DL (ref 31–37)
MCHC RBC AUTO-ENTMCNC: 32.7 G/DL (ref 31–37)
MCHC RBC AUTO-ENTMCNC: 32.8 G/DL (ref 31–37)
MCHC RBC AUTO-ENTMCNC: 32.9 G/DL (ref 31–37)
MCHC RBC AUTO-ENTMCNC: 32.9 G/DL (ref 31–37)
MCHC RBC AUTO-ENTMCNC: 33.1 G/DL (ref 31–37)
MCV RBC AUTO: 88.2 FL (ref 78–100)
MCV RBC AUTO: 88.4 FL (ref 78–100)
MCV RBC AUTO: 88.7 FL (ref 78–100)
MCV RBC AUTO: 88.7 FL (ref 78–100)
MCV RBC AUTO: 89 FL (ref 78–100)
MCV RBC AUTO: 89.6 FL (ref 78–100)
MCV RBC AUTO: 89.8 FL (ref 78–100)
MONOCYTES # BLD: 0.5 K/UL (ref 0.05–1.2)
MONOCYTES # BLD: 0.6 K/UL (ref 0.05–1.2)
MONOCYTES # BLD: 0.6 K/UL (ref 0.05–1.2)
MONOCYTES # BLD: 0.7 K/UL (ref 0.05–1.2)
MONOCYTES # BLD: 0.9 K/UL (ref 0.05–1.2)
MONOCYTES NFR BLD: 10 % (ref 3–10)
MONOCYTES NFR BLD: 11 % (ref 3–10)
MONOCYTES NFR BLD: 11 % (ref 3–10)
MONOCYTES NFR BLD: 12 % (ref 3–10)
MONOCYTES NFR BLD: 12 % (ref 3–10)
NEUTS SEG # BLD: 3 K/UL (ref 1.8–8)
NEUTS SEG # BLD: 3.3 K/UL (ref 1.8–8)
NEUTS SEG # BLD: 3.3 K/UL (ref 1.8–8)
NEUTS SEG # BLD: 3.4 K/UL (ref 1.8–8)
NEUTS SEG # BLD: 3.6 K/UL (ref 1.8–8)
NEUTS SEG # BLD: 3.7 K/UL (ref 1.8–8)
NEUTS SEG # BLD: 4.8 K/UL (ref 1.8–8)
NEUTS SEG NFR BLD: 61 % (ref 40–73)
NEUTS SEG NFR BLD: 61 % (ref 40–73)
NEUTS SEG NFR BLD: 62 % (ref 40–73)
NEUTS SEG NFR BLD: 63 % (ref 40–73)
NEUTS SEG NFR BLD: 64 % (ref 40–73)
NEUTS SEG NFR BLD: 65 % (ref 40–73)
NEUTS SEG NFR BLD: 66 % (ref 40–73)
NITRITE UR QL STRIP.AUTO: NEGATIVE
NITRITE UR QL STRIP.AUTO: NEGATIVE
NRBC # BLD: 0 K/UL (ref 0–0.01)
NRBC BLD-RTO: 0 PER 100 WBC
PH UR STRIP: 5 (ref 5–8)
PH UR STRIP: 6 (ref 5–8)
PHOSPHATE SERPL-MCNC: 2.8 MG/DL (ref 2.5–4.9)
PLATELET # BLD AUTO: 106 K/UL (ref 135–420)
PLATELET # BLD AUTO: 107 K/UL (ref 135–420)
PLATELET # BLD AUTO: 113 K/UL (ref 135–420)
PLATELET # BLD AUTO: 122 K/UL (ref 135–420)
PLATELET # BLD AUTO: 126 K/UL (ref 135–420)
PLATELET # BLD AUTO: 133 K/UL (ref 135–420)
PLATELET # BLD AUTO: 178 K/UL (ref 135–420)
PMV BLD AUTO: 10.3 FL (ref 9.2–11.8)
PMV BLD AUTO: 10.4 FL (ref 9.2–11.8)
PMV BLD AUTO: 10.6 FL (ref 9.2–11.8)
PMV BLD AUTO: 10.7 FL (ref 9.2–11.8)
PMV BLD AUTO: 10.7 FL (ref 9.2–11.8)
PMV BLD AUTO: 10.8 FL (ref 9.2–11.8)
PMV BLD AUTO: 10.8 FL (ref 9.2–11.8)
POTASSIUM SERPL-SCNC: 3.2 MMOL/L (ref 3.5–5.5)
POTASSIUM SERPL-SCNC: 3.3 MMOL/L (ref 3.5–5.5)
POTASSIUM SERPL-SCNC: 3.4 MMOL/L (ref 3.5–5.5)
POTASSIUM SERPL-SCNC: 3.6 MMOL/L (ref 3.5–5.5)
POTASSIUM SERPL-SCNC: 3.7 MMOL/L (ref 3.5–5.5)
POTASSIUM SERPL-SCNC: 3.8 MMOL/L (ref 3.5–5.5)
POTASSIUM SERPL-SCNC: 4 MMOL/L (ref 3.5–5.5)
PROT SERPL-MCNC: 6.8 G/DL (ref 6.4–8.2)
PROT UR STRIP-MCNC: 30 MG/DL
PROT UR STRIP-MCNC: NEGATIVE MG/DL
RBC # BLD AUTO: 4.23 M/UL (ref 4.35–5.65)
RBC # BLD AUTO: 4.41 M/UL (ref 4.35–5.65)
RBC # BLD AUTO: 4.43 M/UL (ref 4.35–5.65)
RBC # BLD AUTO: 4.54 M/UL (ref 4.35–5.65)
RBC # BLD AUTO: 4.9 M/UL (ref 4.35–5.65)
RBC # BLD AUTO: 5.1 M/UL (ref 4.35–5.65)
RBC # BLD AUTO: 5.37 M/UL (ref 4.35–5.65)
RBC #/AREA URNS HPF: NEGATIVE /HPF (ref 0–5)
SERVICE CMNT-IMP: NORMAL
SODIUM SERPL-SCNC: 138 MMOL/L (ref 136–145)
SODIUM SERPL-SCNC: 139 MMOL/L (ref 136–145)
SODIUM SERPL-SCNC: 140 MMOL/L (ref 136–145)
SODIUM SERPL-SCNC: 141 MMOL/L (ref 136–145)
SODIUM SERPL-SCNC: 141 MMOL/L (ref 136–145)
SODIUM SERPL-SCNC: 142 MMOL/L (ref 136–145)
SODIUM SERPL-SCNC: 143 MMOL/L (ref 136–145)
SP GR UR REFRACTOMETRY: 1.02 (ref 1–1.03)
SP GR UR REFRACTOMETRY: >1.03 (ref 1–1.03)
TROPONIN I SERPL HS-MCNC: 15 NG/L (ref 0–78)
TROPONIN I SERPL HS-MCNC: 16 NG/L (ref 0–78)
UROBILINOGEN UR QL STRIP.AUTO: 0.2 EU/DL (ref 0.2–1)
UROBILINOGEN UR QL STRIP.AUTO: 0.2 EU/DL (ref 0.2–1)
VAS LEFT ARTERIAL PROX ANASTOMOSIS EDV: 17.1 CM/S
VAS LEFT ARTERIAL PROX ANASTOMOSIS PSV: 93 CM/S
VAS LEFT CCA DIST EDV: 21.5 CM/S
VAS LEFT CCA DIST PSV: 76.5 CM/S
VAS LEFT CCA MID EDV: 14.95 CM/S
VAS LEFT CCA MID PSV: 67.71 CM/S
VAS LEFT CCA PROX EDV: 15 CM/S
VAS LEFT CCA PROX PSV: 82 CM/S
VAS LEFT DIST OUTFLOW EDV: 21.2 CM/S
VAS LEFT DIST OUTFLOW PSV: 81.7 CM/S
VAS LEFT ECA EDV: 16.36 CM/S
VAS LEFT ECA PSV: 183.9 CM/S
VAS LEFT GRAFT 1: NORMAL
VAS LEFT ICA DIST EDV: 19.2 CM/S
VAS LEFT ICA DIST PSV: 67.3 CM/S
VAS LEFT ICA/CCA PSV: 0.88 NO UNITS
VAS LEFT INFLOW ARTERY EDV: 17.1 CM/S
VAS LEFT INFLOW ARTERY PSV: 88.6 CM/S
VAS LEFT MID OUTFLOW EDV: 34.4 CM/S
VAS LEFT MID OUTFLOW PSV: 110.3 CM/S
VAS LEFT OUTFLOW VESSEL EDV: 19.2 CM/S
VAS LEFT OUTFLOW VESSEL PSV: 67.3 CM/S
VAS LEFT PROX OUTFLOW EDV: 30 CM/S
VAS LEFT PROX OUTFLOW PSV: 88.3 CM/S
VAS LEFT SUBCLAVIAN PROX EDV: 0 CM/S
VAS LEFT SUBCLAVIAN PROX PSV: 73.6 CM/S
VAS LEFT VENOUS DIST ANASTOMOSIS EDV: 23.1 CM/S
VAS LEFT VENOUS DIST ANASTOMOSIS PSV: 123.2 CM/S
VAS LEFT VERTEBRAL EDV: 12.56 CM/S
VAS LEFT VERTEBRAL PSV: 40.3 CM/S
VAS RIGHT ARTERIAL PROX ANASTOMOSIS EDV: 13.3 CM/S
VAS RIGHT ARTERIAL PROX ANASTOMOSIS PSV: 54.4 CM/S
VAS RIGHT CCA DIST EDV: 12.2 CM/S
VAS RIGHT CCA DIST PSV: 54.4 CM/S
VAS RIGHT CCA MID EDV: 14.27 CM/S
VAS RIGHT CCA MID PSV: 41.85 CM/S
VAS RIGHT CCA PROX EDV: 9.5 CM/S
VAS RIGHT CCA PROX PSV: 34.4 CM/S
VAS RIGHT DIST OUTFLOW EDV: 33.2 CM/S
VAS RIGHT DIST OUTFLOW PSV: 142.4 CM/S
VAS RIGHT ECA EDV: 54.86 CM/S
VAS RIGHT ECA PSV: 375.7 CM/S
VAS RIGHT GRAFT 1: NORMAL
VAS RIGHT ICA DIST EDV: 27.8 CM/S
VAS RIGHT ICA DIST PSV: 117.4 CM/S
VAS RIGHT ICA/CCA PSV: 2.3 NO UNITS
VAS RIGHT INFLOW ARTERY EDV: 11.1 CM/S
VAS RIGHT INFLOW ARTERY PSV: 55.5 CM/S
VAS RIGHT MID OUTFLOW EDV: 22 CM/S
VAS RIGHT MID OUTFLOW PSV: 94.3 CM/S
VAS RIGHT OUTFLOW VESSEL EDV: 27.8 CM/S
VAS RIGHT OUTFLOW VESSEL PSV: 117.4 CM/S
VAS RIGHT PROX OUTFLOW EDV: 11.8 CM/S
VAS RIGHT PROX OUTFLOW PSV: 56 CM/S
VAS RIGHT SUBCLAVIAN PROX EDV: 0 CM/S
VAS RIGHT SUBCLAVIAN PROX PSV: 90.3 CM/S
VAS RIGHT VENOUS DIST ANASTOMOSIS EDV: 44.9 CM/S
VAS RIGHT VENOUS DIST ANASTOMOSIS PSV: 161 CM/S
VAS RIGHT VERTEBRAL EDV: 9.51 CM/S
VAS RIGHT VERTEBRAL PSV: 29.6 CM/S
WBC # BLD AUTO: 4.9 K/UL (ref 4.6–13.2)
WBC # BLD AUTO: 5 K/UL (ref 4.6–13.2)
WBC # BLD AUTO: 5.3 K/UL (ref 4.6–13.2)
WBC # BLD AUTO: 5.4 K/UL (ref 4.6–13.2)
WBC # BLD AUTO: 5.5 K/UL (ref 4.6–13.2)
WBC # BLD AUTO: 6 K/UL (ref 4.6–13.2)
WBC # BLD AUTO: 7.5 K/UL (ref 4.6–13.2)
WBC URNS QL MICRO: NORMAL /HPF (ref 0–4)

## 2023-01-01 PROCEDURE — 94761 N-INVAS EAR/PLS OXIMETRY MLT: CPT

## 2023-01-01 PROCEDURE — 0651 HSPC ROUTINE HOME CARE

## 2023-01-01 PROCEDURE — 2580000003 HC RX 258

## 2023-01-01 PROCEDURE — 6370000000 HC RX 637 (ALT 250 FOR IP)

## 2023-01-01 PROCEDURE — 82962 GLUCOSE BLOOD TEST: CPT

## 2023-01-01 PROCEDURE — 99221 1ST HOSP IP/OBS SF/LOW 40: CPT | Performed by: STUDENT IN AN ORGANIZED HEALTH CARE EDUCATION/TRAINING PROGRAM

## 2023-01-01 PROCEDURE — 1100000000 HC RM PRIVATE

## 2023-01-01 PROCEDURE — 93005 ELECTROCARDIOGRAM TRACING: CPT | Performed by: STUDENT IN AN ORGANIZED HEALTH CARE EDUCATION/TRAINING PROGRAM

## 2023-01-01 PROCEDURE — 6360000002 HC RX W HCPCS: Performed by: INTERNAL MEDICINE

## 2023-01-01 PROCEDURE — 6370000000 HC RX 637 (ALT 250 FOR IP): Performed by: STUDENT IN AN ORGANIZED HEALTH CARE EDUCATION/TRAINING PROGRAM

## 2023-01-01 PROCEDURE — G0299 HHS/HOSPICE OF RN EA 15 MIN: HCPCS

## 2023-01-01 PROCEDURE — 84484 ASSAY OF TROPONIN QUANT: CPT

## 2023-01-01 PROCEDURE — 6360000002 HC RX W HCPCS

## 2023-01-01 PROCEDURE — 6370000000 HC RX 637 (ALT 250 FOR IP): Performed by: INTERNAL MEDICINE

## 2023-01-01 PROCEDURE — 80048 BASIC METABOLIC PNL TOTAL CA: CPT

## 2023-01-01 PROCEDURE — 93010 ELECTROCARDIOGRAM REPORT: CPT | Performed by: INTERNAL MEDICINE

## 2023-01-01 PROCEDURE — 99232 SBSQ HOSP IP/OBS MODERATE 35: CPT | Performed by: STUDENT IN AN ORGANIZED HEALTH CARE EDUCATION/TRAINING PROGRAM

## 2023-01-01 PROCEDURE — 36415 COLL VENOUS BLD VENIPUNCTURE: CPT

## 2023-01-01 PROCEDURE — 2500000001 HSPC NON INJECTABLE MED

## 2023-01-01 PROCEDURE — 6360000002 HC RX W HCPCS: Performed by: STUDENT IN AN ORGANIZED HEALTH CARE EDUCATION/TRAINING PROGRAM

## 2023-01-01 PROCEDURE — 99232 SBSQ HOSP IP/OBS MODERATE 35: CPT | Performed by: INTERNAL MEDICINE

## 2023-01-01 PROCEDURE — 93306 TTE W/DOPPLER COMPLETE: CPT | Performed by: INTERNAL MEDICINE

## 2023-01-01 PROCEDURE — 93005 ELECTROCARDIOGRAM TRACING: CPT | Performed by: INTERNAL MEDICINE

## 2023-01-01 PROCEDURE — 85025 COMPLETE CBC W/AUTO DIFF WBC: CPT

## 2023-01-01 PROCEDURE — 76705 ECHO EXAM OF ABDOMEN: CPT

## 2023-01-01 PROCEDURE — 2580000003 HC RX 258: Performed by: INTERNAL MEDICINE

## 2023-01-01 PROCEDURE — 97165 OT EVAL LOW COMPLEX 30 MIN: CPT

## 2023-01-01 PROCEDURE — 99223 1ST HOSP IP/OBS HIGH 75: CPT | Performed by: NURSE PRACTITIONER

## 2023-01-01 PROCEDURE — 74177 CT ABD & PELVIS W/CONTRAST: CPT

## 2023-01-01 PROCEDURE — 2580000003 HC RX 258: Performed by: STUDENT IN AN ORGANIZED HEALTH CARE EDUCATION/TRAINING PROGRAM

## 2023-01-01 PROCEDURE — 97162 PT EVAL MOD COMPLEX 30 MIN: CPT

## 2023-01-01 PROCEDURE — 83690 ASSAY OF LIPASE: CPT

## 2023-01-01 PROCEDURE — 83735 ASSAY OF MAGNESIUM: CPT

## 2023-01-01 PROCEDURE — 97530 THERAPEUTIC ACTIVITIES: CPT

## 2023-01-01 PROCEDURE — 81001 URINALYSIS AUTO W/SCOPE: CPT

## 2023-01-01 PROCEDURE — 84100 ASSAY OF PHOSPHORUS: CPT

## 2023-01-01 PROCEDURE — 93005 ELECTROCARDIOGRAM TRACING: CPT | Performed by: EMERGENCY MEDICINE

## 2023-01-01 PROCEDURE — 71045 X-RAY EXAM CHEST 1 VIEW: CPT

## 2023-01-01 PROCEDURE — 95819 EEG AWAKE AND ASLEEP: CPT

## 2023-01-01 PROCEDURE — 96372 THER/PROPH/DIAG INJ SC/IM: CPT

## 2023-01-01 PROCEDURE — 3331090004 HSPC SERVICE INTENSITY ADD-ON

## 2023-01-01 PROCEDURE — G0155 HHCP-SVS OF CSW,EA 15 MIN: HCPCS

## 2023-01-01 PROCEDURE — 83036 HEMOGLOBIN GLYCOSYLATED A1C: CPT

## 2023-01-01 PROCEDURE — 2700000000 HC OXYGEN THERAPY PER DAY

## 2023-01-01 PROCEDURE — 70450 CT HEAD/BRAIN W/O DYE: CPT

## 2023-01-01 PROCEDURE — 93880 EXTRACRANIAL BILAT STUDY: CPT

## 2023-01-01 PROCEDURE — 81003 URINALYSIS AUTO W/O SCOPE: CPT

## 2023-01-01 PROCEDURE — 99253 IP/OBS CNSLTJ NEW/EST LOW 45: CPT | Performed by: PSYCHIATRY & NEUROLOGY

## 2023-01-01 PROCEDURE — 87040 BLOOD CULTURE FOR BACTERIA: CPT

## 2023-01-01 PROCEDURE — 6360000004 HC RX CONTRAST MEDICATION: Performed by: EMERGENCY MEDICINE

## 2023-01-01 PROCEDURE — 97535 SELF CARE MNGMENT TRAINING: CPT

## 2023-01-01 PROCEDURE — 6360000002 HC RX W HCPCS: Performed by: EMERGENCY MEDICINE

## 2023-01-01 PROCEDURE — 99223 1ST HOSP IP/OBS HIGH 75: CPT

## 2023-01-01 PROCEDURE — 93306 TTE W/DOPPLER COMPLETE: CPT

## 2023-01-01 PROCEDURE — 99231 SBSQ HOSP IP/OBS SF/LOW 25: CPT | Performed by: STUDENT IN AN ORGANIZED HEALTH CARE EDUCATION/TRAINING PROGRAM

## 2023-01-01 PROCEDURE — 80053 COMPREHEN METABOLIC PANEL: CPT

## 2023-01-01 PROCEDURE — 2580000003 HC RX 258: Performed by: EMERGENCY MEDICINE

## 2023-01-01 PROCEDURE — 97116 GAIT TRAINING THERAPY: CPT

## 2023-01-01 PROCEDURE — 96374 THER/PROPH/DIAG INJ IV PUSH: CPT

## 2023-01-01 PROCEDURE — 99285 EMERGENCY DEPT VISIT HI MDM: CPT

## 2023-01-01 PROCEDURE — 87086 URINE CULTURE/COLONY COUNT: CPT

## 2023-01-01 PROCEDURE — 95819 EEG AWAKE AND ASLEEP: CPT | Performed by: STUDENT IN AN ORGANIZED HEALTH CARE EDUCATION/TRAINING PROGRAM

## 2023-01-01 PROCEDURE — 99233 SBSQ HOSP IP/OBS HIGH 50: CPT | Performed by: INTERNAL MEDICINE

## 2023-01-01 PROCEDURE — G0156 HHCP-SVS OF AIDE,EA 15 MIN: HCPCS

## 2023-01-01 PROCEDURE — 97168 OT RE-EVAL EST PLAN CARE: CPT

## 2023-01-01 PROCEDURE — 96375 TX/PRO/DX INJ NEW DRUG ADDON: CPT

## 2023-01-01 PROCEDURE — 99231 SBSQ HOSP IP/OBS SF/LOW 25: CPT | Performed by: INTERNAL MEDICINE

## 2023-01-01 PROCEDURE — 93880 EXTRACRANIAL BILAT STUDY: CPT | Performed by: SURGERY

## 2023-01-01 PROCEDURE — 6370000000 HC RX 637 (ALT 250 FOR IP): Performed by: HOSPITALIST

## 2023-01-01 RX ORDER — LORAZEPAM 1 MG/1
1 TABLET ORAL ONCE
Status: COMPLETED | OUTPATIENT
Start: 2023-01-01 | End: 2023-01-01

## 2023-01-01 RX ORDER — SODIUM CHLORIDE, SODIUM LACTATE, POTASSIUM CHLORIDE, CALCIUM CHLORIDE 600; 310; 30; 20 MG/100ML; MG/100ML; MG/100ML; MG/100ML
INJECTION, SOLUTION INTRAVENOUS CONTINUOUS
Status: DISPENSED | OUTPATIENT
Start: 2023-01-01 | End: 2023-01-01

## 2023-01-01 RX ORDER — HALOPERIDOL 2 MG/ML
2 SOLUTION ORAL EVERY 6 HOURS PRN
Status: DISCONTINUED | OUTPATIENT
Start: 2023-01-01 | End: 2023-01-01 | Stop reason: HOSPADM

## 2023-01-01 RX ORDER — OLANZAPINE 5 MG/1
5 TABLET, ORALLY DISINTEGRATING ORAL NIGHTLY
Qty: 30 TABLET | Refills: 3 | Status: SHIPPED | OUTPATIENT
Start: 2023-01-01

## 2023-01-01 RX ORDER — OLANZAPINE 5 MG/1
5 TABLET, ORALLY DISINTEGRATING ORAL NIGHTLY
Status: DISCONTINUED | OUTPATIENT
Start: 2023-01-01 | End: 2023-01-01 | Stop reason: HOSPADM

## 2023-01-01 RX ORDER — ASPIRIN 81 MG/1
81 TABLET ORAL DAILY
Status: DISCONTINUED | OUTPATIENT
Start: 2023-01-01 | End: 2023-01-01 | Stop reason: HOSPADM

## 2023-01-01 RX ORDER — GABAPENTIN 100 MG/1
100 CAPSULE ORAL 3 TIMES DAILY
Status: DISCONTINUED | OUTPATIENT
Start: 2023-01-01 | End: 2023-01-01

## 2023-01-01 RX ORDER — LORAZEPAM 2 MG/ML
1 CONCENTRATE ORAL EVERY 8 HOURS PRN
Qty: 4.5 ML | Refills: 0 | Status: SHIPPED | OUTPATIENT
Start: 2023-01-01 | End: 2023-01-01

## 2023-01-01 RX ORDER — NALOXONE HYDROCHLORIDE 4 MG/.1ML
1 SPRAY NASAL PRN
Qty: 1 EACH | Refills: 0 | Status: SHIPPED | OUTPATIENT
Start: 2023-01-01

## 2023-01-01 RX ORDER — GABAPENTIN 100 MG/1
100 CAPSULE ORAL 2 TIMES DAILY
Status: DISCONTINUED | OUTPATIENT
Start: 2023-01-01 | End: 2023-01-01

## 2023-01-01 RX ORDER — ACETAMINOPHEN 650 MG/1
650 SUPPOSITORY RECTAL EVERY 6 HOURS PRN
Status: CANCELLED | OUTPATIENT
Start: 2023-01-01

## 2023-01-01 RX ORDER — ONDANSETRON 4 MG/1
4 TABLET, ORALLY DISINTEGRATING ORAL EVERY 8 HOURS PRN
Status: CANCELLED | OUTPATIENT
Start: 2023-01-01

## 2023-01-01 RX ORDER — GABAPENTIN 100 MG/1
200 CAPSULE ORAL NIGHTLY
Status: DISCONTINUED | OUTPATIENT
Start: 2023-01-01 | End: 2023-01-01 | Stop reason: HOSPADM

## 2023-01-01 RX ORDER — ONDANSETRON 2 MG/ML
4 INJECTION INTRAMUSCULAR; INTRAVENOUS EVERY 6 HOURS PRN
Status: CANCELLED | OUTPATIENT
Start: 2023-01-01

## 2023-01-01 RX ORDER — HYOSCYAMINE SULFATE 0.125 MG
125 TABLET ORAL EVERY 4 HOURS PRN
Qty: 180 TABLET | Refills: 3 | Status: SHIPPED | OUTPATIENT
Start: 2023-01-01

## 2023-01-01 RX ORDER — IODIXANOL 320 MG/ML
80 INJECTION, SOLUTION INTRAVASCULAR
Status: COMPLETED | OUTPATIENT
Start: 2023-01-01 | End: 2023-01-01

## 2023-01-01 RX ORDER — 0.9 % SODIUM CHLORIDE 0.9 %
1000 INTRAVENOUS SOLUTION INTRAVENOUS ONCE
Status: COMPLETED | OUTPATIENT
Start: 2023-01-01 | End: 2023-01-01

## 2023-01-01 RX ORDER — SODIUM CHLORIDE, SODIUM LACTATE, POTASSIUM CHLORIDE, CALCIUM CHLORIDE 600; 310; 30; 20 MG/100ML; MG/100ML; MG/100ML; MG/100ML
INJECTION, SOLUTION INTRAVENOUS CONTINUOUS
Status: DISCONTINUED | OUTPATIENT
Start: 2023-01-01 | End: 2023-01-01

## 2023-01-01 RX ORDER — ONDANSETRON 2 MG/ML
4 INJECTION INTRAMUSCULAR; INTRAVENOUS EVERY 6 HOURS PRN
Status: DISCONTINUED | OUTPATIENT
Start: 2023-01-01 | End: 2023-01-01 | Stop reason: HOSPADM

## 2023-01-01 RX ORDER — MORPHINE SULFATE 100 MG/5ML
5 SOLUTION ORAL
Qty: 30 ML | Refills: 0 | Status: SHIPPED | OUTPATIENT
Start: 2023-01-01 | End: 2023-11-16

## 2023-01-01 RX ORDER — POLYETHYLENE GLYCOL 3350 17 G/17G
17 POWDER, FOR SOLUTION ORAL DAILY PRN
Status: CANCELLED | OUTPATIENT
Start: 2023-01-01

## 2023-01-01 RX ORDER — ENOXAPARIN SODIUM 100 MG/ML
40 INJECTION SUBCUTANEOUS DAILY
Status: DISCONTINUED | OUTPATIENT
Start: 2023-01-01 | End: 2023-01-01

## 2023-01-01 RX ORDER — LORAZEPAM 2 MG/ML
2 INJECTION INTRAMUSCULAR EVERY 6 HOURS PRN
Status: DISCONTINUED | OUTPATIENT
Start: 2023-01-01 | End: 2023-01-01

## 2023-01-01 RX ORDER — POLYETHYLENE GLYCOL 3350 17 G/17G
17 POWDER, FOR SOLUTION ORAL DAILY PRN
Status: DISCONTINUED | OUTPATIENT
Start: 2023-01-01 | End: 2023-01-01 | Stop reason: HOSPADM

## 2023-01-01 RX ORDER — FENTANYL 12.5 UG/1
1 PATCH TRANSDERMAL
Status: DISCONTINUED | OUTPATIENT
Start: 2023-01-01 | End: 2023-01-01 | Stop reason: HOSPADM

## 2023-01-01 RX ORDER — DIPHENHYDRAMINE HYDROCHLORIDE 50 MG/ML
25 INJECTION INTRAMUSCULAR; INTRAVENOUS ONCE
Status: COMPLETED | OUTPATIENT
Start: 2023-01-01 | End: 2023-01-01

## 2023-01-01 RX ORDER — TAMSULOSIN HYDROCHLORIDE 0.4 MG/1
0.4 CAPSULE ORAL
Qty: 30 CAPSULE | Refills: 3 | Status: SHIPPED | OUTPATIENT
Start: 2023-01-01

## 2023-01-01 RX ORDER — SODIUM CHLORIDE 9 MG/ML
INJECTION, SOLUTION INTRAVENOUS CONTINUOUS
Status: DISCONTINUED | OUTPATIENT
Start: 2023-01-01 | End: 2023-01-01

## 2023-01-01 RX ORDER — INSULIN LISPRO 100 [IU]/ML
0-8 INJECTION, SOLUTION INTRAVENOUS; SUBCUTANEOUS
Status: DISCONTINUED | OUTPATIENT
Start: 2023-01-01 | End: 2023-01-01 | Stop reason: HOSPADM

## 2023-01-01 RX ORDER — LORAZEPAM 2 MG/ML
2 INJECTION INTRAMUSCULAR ONCE
Status: COMPLETED | OUTPATIENT
Start: 2023-01-01 | End: 2023-01-01

## 2023-01-01 RX ORDER — SODIUM CHLORIDE 0.9 % (FLUSH) 0.9 %
5-40 SYRINGE (ML) INJECTION PRN
Status: CANCELLED | OUTPATIENT
Start: 2023-01-01

## 2023-01-01 RX ORDER — MEMANTINE HYDROCHLORIDE 10 MG/1
10 TABLET ORAL DAILY
Status: DISCONTINUED | OUTPATIENT
Start: 2023-01-01 | End: 2023-01-01

## 2023-01-01 RX ORDER — FAMOTIDINE 20 MG/1
20 TABLET, FILM COATED ORAL DAILY
Status: DISCONTINUED | OUTPATIENT
Start: 2023-01-01 | End: 2023-01-01 | Stop reason: HOSPADM

## 2023-01-01 RX ORDER — TAMSULOSIN HYDROCHLORIDE 0.4 MG/1
0.4 CAPSULE ORAL
Status: DISCONTINUED | OUTPATIENT
Start: 2023-01-01 | End: 2023-01-01

## 2023-01-01 RX ORDER — ACETAMINOPHEN 325 MG/1
650 TABLET ORAL EVERY 6 HOURS PRN
Status: CANCELLED | OUTPATIENT
Start: 2023-01-01

## 2023-01-01 RX ORDER — ONDANSETRON 4 MG/1
4 TABLET, ORALLY DISINTEGRATING ORAL EVERY 8 HOURS PRN
Status: DISCONTINUED | OUTPATIENT
Start: 2023-01-01 | End: 2023-01-01 | Stop reason: HOSPADM

## 2023-01-01 RX ORDER — POTASSIUM CHLORIDE 7.45 MG/ML
10 INJECTION INTRAVENOUS
Status: COMPLETED | OUTPATIENT
Start: 2023-01-01 | End: 2023-01-01

## 2023-01-01 RX ORDER — OLANZAPINE 5 MG/1
5 TABLET, ORALLY DISINTEGRATING ORAL NIGHTLY
Status: DISCONTINUED | OUTPATIENT
Start: 2023-01-01 | End: 2023-01-01

## 2023-01-01 RX ORDER — SODIUM CHLORIDE 0.9 % (FLUSH) 0.9 %
5-40 SYRINGE (ML) INJECTION PRN
Status: DISCONTINUED | OUTPATIENT
Start: 2023-01-01 | End: 2023-01-01 | Stop reason: HOSPADM

## 2023-01-01 RX ORDER — TAMSULOSIN HYDROCHLORIDE 0.4 MG/1
0.4 CAPSULE ORAL
Status: DISCONTINUED | OUTPATIENT
Start: 2023-01-01 | End: 2023-01-01 | Stop reason: HOSPADM

## 2023-01-01 RX ORDER — LANOLIN ALCOHOL/MO/W.PET/CERES
6 CREAM (GRAM) TOPICAL NIGHTLY
Status: DISCONTINUED | OUTPATIENT
Start: 2023-01-01 | End: 2023-01-01 | Stop reason: HOSPADM

## 2023-01-01 RX ORDER — LORAZEPAM 2 MG/ML
1 INJECTION INTRAMUSCULAR EVERY 4 HOURS PRN
Status: DISCONTINUED | OUTPATIENT
Start: 2023-01-01 | End: 2023-01-01

## 2023-01-01 RX ORDER — MORPHINE SULFATE 100 MG/5ML
10 SOLUTION ORAL
Qty: 30 ML | Refills: 0 | Status: SHIPPED | OUTPATIENT
Start: 2023-01-01 | End: 2023-01-01

## 2023-01-01 RX ORDER — ACETAMINOPHEN 325 MG/1
650 TABLET ORAL EVERY 6 HOURS PRN
Status: DISCONTINUED | OUTPATIENT
Start: 2023-01-01 | End: 2023-01-01 | Stop reason: HOSPADM

## 2023-01-01 RX ORDER — HALOPERIDOL 5 MG/ML
5 INJECTION INTRAMUSCULAR ONCE
Status: COMPLETED | OUTPATIENT
Start: 2023-01-01 | End: 2023-01-01

## 2023-01-01 RX ORDER — DEXTROSE MONOHYDRATE 100 MG/ML
INJECTION, SOLUTION INTRAVENOUS CONTINUOUS PRN
Status: DISCONTINUED | OUTPATIENT
Start: 2023-01-01 | End: 2023-01-01 | Stop reason: HOSPADM

## 2023-01-01 RX ORDER — ENOXAPARIN SODIUM 100 MG/ML
30 INJECTION SUBCUTANEOUS DAILY
Status: DISCONTINUED | OUTPATIENT
Start: 2023-01-01 | End: 2023-01-01

## 2023-01-01 RX ORDER — LABETALOL HYDROCHLORIDE 5 MG/ML
5 INJECTION, SOLUTION INTRAVENOUS EVERY 6 HOURS PRN
Status: DISCONTINUED | OUTPATIENT
Start: 2023-01-01 | End: 2023-01-01

## 2023-01-01 RX ORDER — LORAZEPAM 2 MG/ML
0.5 INJECTION INTRAMUSCULAR ONCE
Status: DISCONTINUED | OUTPATIENT
Start: 2023-01-01 | End: 2023-01-01

## 2023-01-01 RX ORDER — SODIUM CHLORIDE 9 MG/ML
INJECTION, SOLUTION INTRAVENOUS PRN
Status: CANCELLED | OUTPATIENT
Start: 2023-01-01

## 2023-01-01 RX ORDER — ROSUVASTATIN CALCIUM 10 MG/1
10 TABLET, COATED ORAL DAILY
Status: DISCONTINUED | OUTPATIENT
Start: 2023-01-01 | End: 2023-01-01 | Stop reason: HOSPADM

## 2023-01-01 RX ORDER — LORAZEPAM 2 MG/ML
1 CONCENTRATE ORAL EVERY 8 HOURS PRN
Qty: 30 ML | Refills: 0 | Status: SHIPPED | OUTPATIENT
Start: 2023-01-01 | End: 2023-11-27

## 2023-01-01 RX ORDER — MORPHINE SULFATE 2 MG/ML
2 INJECTION, SOLUTION INTRAMUSCULAR; INTRAVENOUS
Status: COMPLETED | OUTPATIENT
Start: 2023-01-01 | End: 2023-01-01

## 2023-01-01 RX ORDER — LORAZEPAM 2 MG/ML
1 INJECTION INTRAMUSCULAR ONCE
Status: COMPLETED | OUTPATIENT
Start: 2023-01-01 | End: 2023-01-01

## 2023-01-01 RX ORDER — DONEPEZIL HYDROCHLORIDE 10 MG/1
10 TABLET, FILM COATED ORAL NIGHTLY
Qty: 30 TABLET | Refills: 5 | Status: CANCELLED | OUTPATIENT
Start: 2023-01-01

## 2023-01-01 RX ORDER — OLANZAPINE 5 MG/1
2.5 TABLET, ORALLY DISINTEGRATING ORAL NIGHTLY
Status: DISCONTINUED | OUTPATIENT
Start: 2023-01-01 | End: 2023-01-01

## 2023-01-01 RX ORDER — ENOXAPARIN SODIUM 100 MG/ML
40 INJECTION SUBCUTANEOUS DAILY
Status: DISCONTINUED | OUTPATIENT
Start: 2023-01-01 | End: 2023-01-01 | Stop reason: HOSPADM

## 2023-01-01 RX ORDER — ACETAMINOPHEN 650 MG/1
650 SUPPOSITORY RECTAL EVERY 6 HOURS PRN
Status: DISCONTINUED | OUTPATIENT
Start: 2023-01-01 | End: 2023-01-01 | Stop reason: HOSPADM

## 2023-01-01 RX ORDER — ENOXAPARIN SODIUM 100 MG/ML
40 INJECTION SUBCUTANEOUS DAILY
Status: CANCELLED | OUTPATIENT
Start: 2023-01-01

## 2023-01-01 RX ORDER — SODIUM CHLORIDE 0.9 % (FLUSH) 0.9 %
5-40 SYRINGE (ML) INJECTION EVERY 12 HOURS SCHEDULED
Status: DISCONTINUED | OUTPATIENT
Start: 2023-01-01 | End: 2023-01-01 | Stop reason: HOSPADM

## 2023-01-01 RX ORDER — LORAZEPAM 0.5 MG/1
0.5 TABLET ORAL EVERY 8 HOURS
Status: DISCONTINUED | OUTPATIENT
Start: 2023-01-01 | End: 2023-01-01

## 2023-01-01 RX ORDER — QUETIAPINE FUMARATE 25 MG/1
50 TABLET, FILM COATED ORAL ONCE
Status: COMPLETED | OUTPATIENT
Start: 2023-01-01 | End: 2023-01-01

## 2023-01-01 RX ORDER — ZIPRASIDONE MESYLATE 20 MG/ML
20 INJECTION, POWDER, LYOPHILIZED, FOR SOLUTION INTRAMUSCULAR EVERY 6 HOURS PRN
Status: DISCONTINUED | OUTPATIENT
Start: 2023-01-01 | End: 2023-01-01

## 2023-01-01 RX ORDER — BISACODYL 5 MG/1
5 TABLET, DELAYED RELEASE ORAL DAILY PRN
Qty: 30 TABLET | Refills: 0 | Status: SHIPPED | OUTPATIENT
Start: 2023-01-01

## 2023-01-01 RX ORDER — SODIUM CHLORIDE 0.9 % (FLUSH) 0.9 %
5-40 SYRINGE (ML) INJECTION EVERY 12 HOURS SCHEDULED
Status: CANCELLED | OUTPATIENT
Start: 2023-01-01

## 2023-01-01 RX ORDER — QUETIAPINE FUMARATE 25 MG/1
12.5 TABLET, FILM COATED ORAL NIGHTLY
Status: DISCONTINUED | OUTPATIENT
Start: 2023-01-01 | End: 2023-01-01

## 2023-01-01 RX ADMIN — ASPIRIN 81 MG: 81 TABLET, COATED ORAL at 11:09

## 2023-01-01 RX ADMIN — ASPIRIN 81 MG: 81 TABLET, COATED ORAL at 09:42

## 2023-01-01 RX ADMIN — OLANZAPINE 2.5 MG: 5 TABLET, ORALLY DISINTEGRATING ORAL at 21:50

## 2023-01-01 RX ADMIN — SODIUM CHLORIDE, PRESERVATIVE FREE 10 ML: 5 INJECTION INTRAVENOUS at 12:20

## 2023-01-01 RX ADMIN — ENOXAPARIN SODIUM 40 MG: 100 INJECTION SUBCUTANEOUS at 11:43

## 2023-01-01 RX ADMIN — SODIUM CHLORIDE, POTASSIUM CHLORIDE, SODIUM LACTATE AND CALCIUM CHLORIDE: 600; 310; 30; 20 INJECTION, SOLUTION INTRAVENOUS at 21:39

## 2023-01-01 RX ADMIN — ENOXAPARIN SODIUM 40 MG: 100 INJECTION SUBCUTANEOUS at 09:41

## 2023-01-01 RX ADMIN — SODIUM CHLORIDE, PRESERVATIVE FREE 10 ML: 5 INJECTION INTRAVENOUS at 09:12

## 2023-01-01 RX ADMIN — ROSUVASTATIN CALCIUM 10 MG: 10 TABLET, COATED ORAL at 21:34

## 2023-01-01 RX ADMIN — HALOPERIDOL LACTATE 5 MG: 5 INJECTION, SOLUTION INTRAMUSCULAR at 20:20

## 2023-01-01 RX ADMIN — SODIUM CHLORIDE, PRESERVATIVE FREE 5 ML: 5 INJECTION INTRAVENOUS at 21:18

## 2023-01-01 RX ADMIN — GABAPENTIN 200 MG: 100 CAPSULE ORAL at 21:14

## 2023-01-01 RX ADMIN — METOPROLOL TARTRATE 12.5 MG: 25 TABLET, FILM COATED ORAL at 21:14

## 2023-01-01 RX ADMIN — INSULIN LISPRO 2 UNITS: 100 INJECTION, SOLUTION INTRAVENOUS; SUBCUTANEOUS at 09:21

## 2023-01-01 RX ADMIN — ASPIRIN 81 MG: 81 TABLET, COATED ORAL at 09:10

## 2023-01-01 RX ADMIN — SODIUM CHLORIDE, PRESERVATIVE FREE 10 ML: 5 INJECTION INTRAVENOUS at 14:06

## 2023-01-01 RX ADMIN — SODIUM CHLORIDE, POTASSIUM CHLORIDE, SODIUM LACTATE AND CALCIUM CHLORIDE: 600; 310; 30; 20 INJECTION, SOLUTION INTRAVENOUS at 08:03

## 2023-01-01 RX ADMIN — ROSUVASTATIN CALCIUM 10 MG: 10 TABLET, COATED ORAL at 20:59

## 2023-01-01 RX ADMIN — ENOXAPARIN SODIUM 40 MG: 100 INJECTION SUBCUTANEOUS at 09:10

## 2023-01-01 RX ADMIN — HALOPERIDOL 2 MG: 2 SOLUTION ORAL at 18:16

## 2023-01-01 RX ADMIN — Medication 6 MG: at 20:03

## 2023-01-01 RX ADMIN — WATER 10 MG: 1 INJECTION INTRAMUSCULAR; INTRAVENOUS; SUBCUTANEOUS at 18:10

## 2023-01-01 RX ADMIN — METOPROLOL TARTRATE 12.5 MG: 25 TABLET, FILM COATED ORAL at 20:25

## 2023-01-01 RX ADMIN — ROSUVASTATIN CALCIUM 10 MG: 10 TABLET, COATED ORAL at 20:03

## 2023-01-01 RX ADMIN — SODIUM CHLORIDE, PRESERVATIVE FREE 10 ML: 5 INJECTION INTRAVENOUS at 20:06

## 2023-01-01 RX ADMIN — ROSUVASTATIN CALCIUM 10 MG: 10 TABLET, COATED ORAL at 21:30

## 2023-01-01 RX ADMIN — INSULIN LISPRO 4 UNITS: 100 INJECTION, SOLUTION INTRAVENOUS; SUBCUTANEOUS at 11:43

## 2023-01-01 RX ADMIN — SODIUM CHLORIDE, PRESERVATIVE FREE 10 ML: 5 INJECTION INTRAVENOUS at 12:34

## 2023-01-01 RX ADMIN — ROSUVASTATIN CALCIUM 10 MG: 10 TABLET, COATED ORAL at 21:14

## 2023-01-01 RX ADMIN — IODIXANOL 80 ML: 320 INJECTION, SOLUTION INTRAVASCULAR at 18:40

## 2023-01-01 RX ADMIN — Medication 5000 UNITS: at 10:55

## 2023-01-01 RX ADMIN — HALOPERIDOL 2 MG: 2 SOLUTION ORAL at 21:21

## 2023-01-01 RX ADMIN — METOPROLOL TARTRATE 12.5 MG: 25 TABLET, FILM COATED ORAL at 09:51

## 2023-01-01 RX ADMIN — ENOXAPARIN SODIUM 40 MG: 100 INJECTION SUBCUTANEOUS at 10:55

## 2023-01-01 RX ADMIN — Medication 6 MG: at 20:12

## 2023-01-01 RX ADMIN — INSULIN LISPRO 4 UNITS: 100 INJECTION, SOLUTION INTRAVENOUS; SUBCUTANEOUS at 16:43

## 2023-01-01 RX ADMIN — METOPROLOL TARTRATE 12.5 MG: 25 TABLET, FILM COATED ORAL at 20:41

## 2023-01-01 RX ADMIN — SODIUM CHLORIDE, PRESERVATIVE FREE 5 ML: 5 INJECTION INTRAVENOUS at 20:40

## 2023-01-01 RX ADMIN — TAMSULOSIN HYDROCHLORIDE 0.4 MG: 0.4 CAPSULE ORAL at 20:03

## 2023-01-01 RX ADMIN — METOPROLOL TARTRATE 12.5 MG: 25 TABLET, FILM COATED ORAL at 09:00

## 2023-01-01 RX ADMIN — ENOXAPARIN SODIUM 40 MG: 100 INJECTION SUBCUTANEOUS at 09:21

## 2023-01-01 RX ADMIN — SODIUM CHLORIDE, PRESERVATIVE FREE 5 ML: 5 INJECTION INTRAVENOUS at 21:03

## 2023-01-01 RX ADMIN — ASPIRIN 81 MG: 81 TABLET, COATED ORAL at 12:14

## 2023-01-01 RX ADMIN — ROSUVASTATIN CALCIUM 10 MG: 10 TABLET, COATED ORAL at 21:16

## 2023-01-01 RX ADMIN — MORPHINE SULFATE 2 MG: 2 INJECTION, SOLUTION INTRAMUSCULAR; INTRAVENOUS at 21:01

## 2023-01-01 RX ADMIN — ENOXAPARIN SODIUM 30 MG: 100 INJECTION SUBCUTANEOUS at 09:11

## 2023-01-01 RX ADMIN — METOPROLOL TARTRATE 12.5 MG: 25 TABLET, FILM COATED ORAL at 10:13

## 2023-01-01 RX ADMIN — TAMSULOSIN HYDROCHLORIDE 0.4 MG: 0.4 CAPSULE ORAL at 20:56

## 2023-01-01 RX ADMIN — ENOXAPARIN SODIUM 40 MG: 100 INJECTION SUBCUTANEOUS at 12:14

## 2023-01-01 RX ADMIN — SODIUM CHLORIDE, POTASSIUM CHLORIDE, SODIUM LACTATE AND CALCIUM CHLORIDE: 600; 310; 30; 20 INJECTION, SOLUTION INTRAVENOUS at 09:49

## 2023-01-01 RX ADMIN — LABETALOL HYDROCHLORIDE 5 MG: 5 INJECTION, SOLUTION INTRAVENOUS at 04:43

## 2023-01-01 RX ADMIN — INSULIN LISPRO 2 UNITS: 100 INJECTION, SOLUTION INTRAVENOUS; SUBCUTANEOUS at 11:43

## 2023-01-01 RX ADMIN — Medication 5000 UNITS: at 09:39

## 2023-01-01 RX ADMIN — SODIUM CHLORIDE, POTASSIUM CHLORIDE, SODIUM LACTATE AND CALCIUM CHLORIDE: 600; 310; 30; 20 INJECTION, SOLUTION INTRAVENOUS at 09:12

## 2023-01-01 RX ADMIN — Medication 500 MG: at 06:17

## 2023-01-01 RX ADMIN — INSULIN LISPRO 6 UNITS: 100 INJECTION, SOLUTION INTRAVENOUS; SUBCUTANEOUS at 20:57

## 2023-01-01 RX ADMIN — METOPROLOL TARTRATE 12.5 MG: 25 TABLET, FILM COATED ORAL at 20:03

## 2023-01-01 RX ADMIN — SODIUM CHLORIDE, PRESERVATIVE FREE 5 ML: 5 INJECTION INTRAVENOUS at 09:59

## 2023-01-01 RX ADMIN — SODIUM CHLORIDE, POTASSIUM CHLORIDE, SODIUM LACTATE AND CALCIUM CHLORIDE: 600; 310; 30; 20 INJECTION, SOLUTION INTRAVENOUS at 22:31

## 2023-01-01 RX ADMIN — INSULIN LISPRO 8 UNITS: 100 INJECTION, SOLUTION INTRAVENOUS; SUBCUTANEOUS at 17:11

## 2023-01-01 RX ADMIN — GABAPENTIN 200 MG: 100 CAPSULE ORAL at 20:40

## 2023-01-01 RX ADMIN — ROSUVASTATIN CALCIUM 10 MG: 10 TABLET, COATED ORAL at 20:12

## 2023-01-01 RX ADMIN — ASPIRIN 81 MG: 81 TABLET, COATED ORAL at 12:32

## 2023-01-01 RX ADMIN — SODIUM CHLORIDE, PRESERVATIVE FREE 10 ML: 5 INJECTION INTRAVENOUS at 21:31

## 2023-01-01 RX ADMIN — WATER 2.5 MG: 1 INJECTION INTRAMUSCULAR; INTRAVENOUS; SUBCUTANEOUS at 20:32

## 2023-01-01 RX ADMIN — METOPROLOL TARTRATE 12.5 MG: 25 TABLET, FILM COATED ORAL at 09:21

## 2023-01-01 RX ADMIN — WATER 5 MG: 1 INJECTION INTRAMUSCULAR; INTRAVENOUS; SUBCUTANEOUS at 21:04

## 2023-01-01 RX ADMIN — METOPROLOL TARTRATE 12.5 MG: 25 TABLET, FILM COATED ORAL at 20:59

## 2023-01-01 RX ADMIN — FAMOTIDINE 20 MG: 20 TABLET, FILM COATED ORAL at 09:22

## 2023-01-01 RX ADMIN — OLANZAPINE 2.5 MG: 5 TABLET, ORALLY DISINTEGRATING ORAL at 21:57

## 2023-01-01 RX ADMIN — ROSUVASTATIN CALCIUM 10 MG: 10 TABLET, COATED ORAL at 21:20

## 2023-01-01 RX ADMIN — METOPROLOL TARTRATE 12.5 MG: 25 TABLET, FILM COATED ORAL at 20:56

## 2023-01-01 RX ADMIN — SODIUM CHLORIDE, PRESERVATIVE FREE 10 ML: 5 INJECTION INTRAVENOUS at 09:40

## 2023-01-01 RX ADMIN — POTASSIUM CHLORIDE 10 MEQ: 7.46 INJECTION, SOLUTION INTRAVENOUS at 16:01

## 2023-01-01 RX ADMIN — METOPROLOL TARTRATE 12.5 MG: 25 TABLET, FILM COATED ORAL at 21:52

## 2023-01-01 RX ADMIN — SODIUM CHLORIDE, POTASSIUM CHLORIDE, SODIUM LACTATE AND CALCIUM CHLORIDE: 600; 310; 30; 20 INJECTION, SOLUTION INTRAVENOUS at 23:25

## 2023-01-01 RX ADMIN — Medication 5000 UNITS: at 09:12

## 2023-01-01 RX ADMIN — GABAPENTIN 100 MG: 100 CAPSULE ORAL at 09:11

## 2023-01-01 RX ADMIN — ASPIRIN 81 MG: 81 TABLET, COATED ORAL at 09:17

## 2023-01-01 RX ADMIN — SODIUM CHLORIDE: 9 INJECTION, SOLUTION INTRAVENOUS at 23:26

## 2023-01-01 RX ADMIN — SODIUM CHLORIDE, PRESERVATIVE FREE 10 ML: 5 INJECTION INTRAVENOUS at 08:02

## 2023-01-01 RX ADMIN — INSULIN LISPRO 2 UNITS: 100 INJECTION, SOLUTION INTRAVENOUS; SUBCUTANEOUS at 22:31

## 2023-01-01 RX ADMIN — OLANZAPINE 5 MG: 5 TABLET, ORALLY DISINTEGRATING ORAL at 22:11

## 2023-01-01 RX ADMIN — FAMOTIDINE 20 MG: 20 TABLET, FILM COATED ORAL at 09:02

## 2023-01-01 RX ADMIN — TAMSULOSIN HYDROCHLORIDE 0.4 MG: 0.4 CAPSULE ORAL at 20:40

## 2023-01-01 RX ADMIN — METOPROLOL TARTRATE 12.5 MG: 25 TABLET, FILM COATED ORAL at 21:33

## 2023-01-01 RX ADMIN — INSULIN LISPRO 2 UNITS: 100 INJECTION, SOLUTION INTRAVENOUS; SUBCUTANEOUS at 23:26

## 2023-01-01 RX ADMIN — METOPROLOL TARTRATE 12.5 MG: 25 TABLET, FILM COATED ORAL at 12:32

## 2023-01-01 RX ADMIN — INSULIN LISPRO 4 UNITS: 100 INJECTION, SOLUTION INTRAVENOUS; SUBCUTANEOUS at 12:03

## 2023-01-01 RX ADMIN — SODIUM CHLORIDE, POTASSIUM CHLORIDE, SODIUM LACTATE AND CALCIUM CHLORIDE: 600; 310; 30; 20 INJECTION, SOLUTION INTRAVENOUS at 18:11

## 2023-01-01 RX ADMIN — SODIUM CHLORIDE, PRESERVATIVE FREE 10 ML: 5 INJECTION INTRAVENOUS at 08:42

## 2023-01-01 RX ADMIN — ASPIRIN 81 MG: 81 TABLET, COATED ORAL at 11:50

## 2023-01-01 RX ADMIN — LORAZEPAM 1 MG: 2 INJECTION INTRAMUSCULAR; INTRAVENOUS at 00:47

## 2023-01-01 RX ADMIN — LORAZEPAM 1 MG: 2 INJECTION INTRAMUSCULAR; INTRAVENOUS at 21:01

## 2023-01-01 RX ADMIN — FAMOTIDINE 20 MG: 20 TABLET, FILM COATED ORAL at 10:13

## 2023-01-01 RX ADMIN — SODIUM CHLORIDE, PRESERVATIVE FREE 10 ML: 5 INJECTION INTRAVENOUS at 20:42

## 2023-01-01 RX ADMIN — METOPROLOL TARTRATE 12.5 MG: 25 TABLET, FILM COATED ORAL at 21:20

## 2023-01-01 RX ADMIN — GABAPENTIN 200 MG: 100 CAPSULE ORAL at 22:10

## 2023-01-01 RX ADMIN — INSULIN LISPRO 2 UNITS: 100 INJECTION, SOLUTION INTRAVENOUS; SUBCUTANEOUS at 21:51

## 2023-01-01 RX ADMIN — SODIUM CHLORIDE 1000 ML: 9 INJECTION, SOLUTION INTRAVENOUS at 19:08

## 2023-01-01 RX ADMIN — TAMSULOSIN HYDROCHLORIDE 0.4 MG: 0.4 CAPSULE ORAL at 21:30

## 2023-01-01 RX ADMIN — ASPIRIN 81 MG: 81 TABLET, COATED ORAL at 17:16

## 2023-01-01 RX ADMIN — ENOXAPARIN SODIUM 40 MG: 100 INJECTION SUBCUTANEOUS at 10:15

## 2023-01-01 RX ADMIN — ASPIRIN 81 MG: 81 TABLET, COATED ORAL at 09:02

## 2023-01-01 RX ADMIN — Medication 6 MG: at 21:30

## 2023-01-01 RX ADMIN — FAMOTIDINE 20 MG: 20 TABLET, FILM COATED ORAL at 11:10

## 2023-01-01 RX ADMIN — SODIUM CHLORIDE, PRESERVATIVE FREE 10 ML: 5 INJECTION INTRAVENOUS at 09:25

## 2023-01-01 RX ADMIN — FAMOTIDINE 20 MG: 20 TABLET, FILM COATED ORAL at 07:55

## 2023-01-01 RX ADMIN — SODIUM CHLORIDE, PRESERVATIVE FREE 10 ML: 5 INJECTION INTRAVENOUS at 09:41

## 2023-01-01 RX ADMIN — TAMSULOSIN HYDROCHLORIDE 0.4 MG: 0.4 CAPSULE ORAL at 21:15

## 2023-01-01 RX ADMIN — Medication 5000 UNITS: at 09:42

## 2023-01-01 RX ADMIN — SODIUM CHLORIDE, PRESERVATIVE FREE 10 ML: 5 INJECTION INTRAVENOUS at 14:36

## 2023-01-01 RX ADMIN — ASPIRIN 81 MG: 81 TABLET, COATED ORAL at 09:21

## 2023-01-01 RX ADMIN — ENOXAPARIN SODIUM 30 MG: 100 INJECTION SUBCUTANEOUS at 10:15

## 2023-01-01 RX ADMIN — INSULIN LISPRO 4 UNITS: 100 INJECTION, SOLUTION INTRAVENOUS; SUBCUTANEOUS at 09:10

## 2023-01-01 RX ADMIN — METOPROLOL TARTRATE 12.5 MG: 25 TABLET, FILM COATED ORAL at 22:10

## 2023-01-01 RX ADMIN — LORAZEPAM 0.5 MG: 0.5 TABLET ORAL at 21:20

## 2023-01-01 RX ADMIN — SODIUM CHLORIDE, PRESERVATIVE FREE 10 ML: 5 INJECTION INTRAVENOUS at 21:36

## 2023-01-01 RX ADMIN — Medication 6 MG: at 22:11

## 2023-01-01 RX ADMIN — METOPROLOL TARTRATE 12.5 MG: 25 TABLET, FILM COATED ORAL at 09:11

## 2023-01-01 RX ADMIN — Medication 6 MG: at 21:14

## 2023-01-01 RX ADMIN — FAMOTIDINE 20 MG: 20 TABLET, FILM COATED ORAL at 09:42

## 2023-01-01 RX ADMIN — ASPIRIN 81 MG: 81 TABLET, COATED ORAL at 07:55

## 2023-01-01 RX ADMIN — Medication 6 MG: at 21:13

## 2023-01-01 RX ADMIN — Medication 5000 UNITS: at 09:10

## 2023-01-01 RX ADMIN — HALOPERIDOL 2 MG: 2 SOLUTION ORAL at 00:12

## 2023-01-01 RX ADMIN — METOPROLOL TARTRATE 12.5 MG: 25 TABLET, FILM COATED ORAL at 09:17

## 2023-01-01 RX ADMIN — Medication 5000 UNITS: at 17:16

## 2023-01-01 RX ADMIN — ROSUVASTATIN CALCIUM 10 MG: 10 TABLET, COATED ORAL at 22:32

## 2023-01-01 RX ADMIN — LORAZEPAM 2 MG: 2 INJECTION INTRAMUSCULAR; INTRAVENOUS at 12:30

## 2023-01-01 RX ADMIN — GABAPENTIN 100 MG: 100 CAPSULE ORAL at 14:05

## 2023-01-01 RX ADMIN — ACETAMINOPHEN 325MG 650 MG: 325 TABLET ORAL at 13:14

## 2023-01-01 RX ADMIN — Medication 5000 UNITS: at 09:21

## 2023-01-01 RX ADMIN — ASPIRIN 81 MG: 81 TABLET, COATED ORAL at 09:51

## 2023-01-01 RX ADMIN — INSULIN LISPRO 2 UNITS: 100 INJECTION, SOLUTION INTRAVENOUS; SUBCUTANEOUS at 22:40

## 2023-01-01 RX ADMIN — SODIUM CHLORIDE, PRESERVATIVE FREE 10 ML: 5 INJECTION INTRAVENOUS at 23:19

## 2023-01-01 RX ADMIN — INSULIN LISPRO 2 UNITS: 100 INJECTION, SOLUTION INTRAVENOUS; SUBCUTANEOUS at 12:05

## 2023-01-01 RX ADMIN — Medication 6 MG: at 21:49

## 2023-01-01 RX ADMIN — DIPHENHYDRAMINE HYDROCHLORIDE 25 MG: 50 INJECTION INTRAMUSCULAR; INTRAVENOUS at 14:36

## 2023-01-01 RX ADMIN — METOPROLOL TARTRATE 12.5 MG: 25 TABLET, FILM COATED ORAL at 09:39

## 2023-01-01 RX ADMIN — ENOXAPARIN SODIUM 40 MG: 100 INJECTION SUBCUTANEOUS at 08:00

## 2023-01-01 RX ADMIN — INSULIN LISPRO 4 UNITS: 100 INJECTION, SOLUTION INTRAVENOUS; SUBCUTANEOUS at 12:07

## 2023-01-01 RX ADMIN — ASPIRIN 81 MG: 81 TABLET, COATED ORAL at 09:39

## 2023-01-01 RX ADMIN — METOPROLOL TARTRATE 12.5 MG: 25 TABLET, FILM COATED ORAL at 21:50

## 2023-01-01 RX ADMIN — INSULIN LISPRO 4 UNITS: 100 INJECTION, SOLUTION INTRAVENOUS; SUBCUTANEOUS at 21:12

## 2023-01-01 RX ADMIN — ASPIRIN 81 MG: 81 TABLET, COATED ORAL at 10:55

## 2023-01-01 RX ADMIN — HALOPERIDOL 2 MG: 2 SOLUTION ORAL at 12:58

## 2023-01-01 RX ADMIN — METOPROLOL TARTRATE 12.5 MG: 25 TABLET, FILM COATED ORAL at 07:57

## 2023-01-01 RX ADMIN — OLANZAPINE 2.5 MG: 5 TABLET, ORALLY DISINTEGRATING ORAL at 22:37

## 2023-01-01 RX ADMIN — Medication 6 MG: at 20:56

## 2023-01-01 RX ADMIN — GABAPENTIN 100 MG: 100 CAPSULE ORAL at 21:50

## 2023-01-01 RX ADMIN — LORAZEPAM 1 MG: 1 TABLET ORAL at 00:53

## 2023-01-01 RX ADMIN — LORAZEPAM 1 MG: 2 INJECTION INTRAMUSCULAR; INTRAVENOUS at 13:17

## 2023-01-01 RX ADMIN — SODIUM CHLORIDE, PRESERVATIVE FREE 10 ML: 5 INJECTION INTRAVENOUS at 11:01

## 2023-01-01 RX ADMIN — GABAPENTIN 200 MG: 100 CAPSULE ORAL at 20:12

## 2023-01-01 RX ADMIN — Medication 6 MG: at 20:40

## 2023-01-01 RX ADMIN — SODIUM CHLORIDE, PRESERVATIVE FREE 10 ML: 5 INJECTION INTRAVENOUS at 20:26

## 2023-01-01 RX ADMIN — MEMANTINE HYDROCHLORIDE 10 MG: 10 TABLET ORAL at 11:52

## 2023-01-01 RX ADMIN — LORAZEPAM 1 MG: 2 INJECTION INTRAMUSCULAR; INTRAVENOUS at 15:12

## 2023-01-01 RX ADMIN — WATER 5 MG: 1 INJECTION INTRAMUSCULAR; INTRAVENOUS; SUBCUTANEOUS at 00:07

## 2023-01-01 RX ADMIN — GABAPENTIN 100 MG: 100 CAPSULE ORAL at 20:57

## 2023-01-01 RX ADMIN — OLANZAPINE 2.5 MG: 5 TABLET, ORALLY DISINTEGRATING ORAL at 21:30

## 2023-01-01 RX ADMIN — Medication 5000 UNITS: at 10:13

## 2023-01-01 RX ADMIN — ROSUVASTATIN CALCIUM 10 MG: 10 TABLET, COATED ORAL at 20:40

## 2023-01-01 RX ADMIN — INSULIN LISPRO 6 UNITS: 100 INJECTION, SOLUTION INTRAVENOUS; SUBCUTANEOUS at 22:16

## 2023-01-01 RX ADMIN — SODIUM CHLORIDE, POTASSIUM CHLORIDE, SODIUM LACTATE AND CALCIUM CHLORIDE: 600; 310; 30; 20 INJECTION, SOLUTION INTRAVENOUS at 11:49

## 2023-01-01 RX ADMIN — ROSUVASTATIN CALCIUM 10 MG: 10 TABLET, COATED ORAL at 21:50

## 2023-01-01 RX ADMIN — INSULIN LISPRO 4 UNITS: 100 INJECTION, SOLUTION INTRAVENOUS; SUBCUTANEOUS at 08:15

## 2023-01-01 RX ADMIN — METOPROLOL TARTRATE 12.5 MG: 25 TABLET, FILM COATED ORAL at 11:46

## 2023-01-01 RX ADMIN — OLANZAPINE 2.5 MG: 5 TABLET, ORALLY DISINTEGRATING ORAL at 20:56

## 2023-01-01 RX ADMIN — METOPROLOL TARTRATE 12.5 MG: 25 TABLET, FILM COATED ORAL at 12:17

## 2023-01-01 RX ADMIN — FAMOTIDINE 20 MG: 20 TABLET, FILM COATED ORAL at 09:51

## 2023-01-01 RX ADMIN — LORAZEPAM 1 MG: 2 INJECTION INTRAMUSCULAR; INTRAVENOUS at 17:10

## 2023-01-01 RX ADMIN — METOPROLOL TARTRATE 12.5 MG: 25 TABLET, FILM COATED ORAL at 21:30

## 2023-01-01 RX ADMIN — POTASSIUM CHLORIDE 10 MEQ: 7.46 INJECTION, SOLUTION INTRAVENOUS at 15:15

## 2023-01-01 RX ADMIN — FAMOTIDINE 20 MG: 20 TABLET, FILM COATED ORAL at 09:39

## 2023-01-01 RX ADMIN — WATER 2.5 MG: 1 INJECTION INTRAMUSCULAR; INTRAVENOUS; SUBCUTANEOUS at 11:40

## 2023-01-01 RX ADMIN — ROSUVASTATIN CALCIUM 10 MG: 10 TABLET, COATED ORAL at 21:57

## 2023-01-01 RX ADMIN — ROSUVASTATIN CALCIUM 10 MG: 10 TABLET, COATED ORAL at 20:41

## 2023-01-01 RX ADMIN — LORAZEPAM 1 MG: 2 INJECTION INTRAMUSCULAR; INTRAVENOUS at 23:07

## 2023-01-01 RX ADMIN — INSULIN LISPRO 2 UNITS: 100 INJECTION, SOLUTION INTRAVENOUS; SUBCUTANEOUS at 21:21

## 2023-01-01 RX ADMIN — GABAPENTIN 100 MG: 100 CAPSULE ORAL at 21:30

## 2023-01-01 RX ADMIN — OLANZAPINE 5 MG: 5 TABLET, ORALLY DISINTEGRATING ORAL at 21:33

## 2023-01-01 RX ADMIN — OLANZAPINE 2.5 MG: 5 TABLET, ORALLY DISINTEGRATING ORAL at 21:20

## 2023-01-01 RX ADMIN — SODIUM CHLORIDE, PRESERVATIVE FREE 5 ML: 5 INJECTION INTRAVENOUS at 22:18

## 2023-01-01 RX ADMIN — WATER 2.5 MG: 1 INJECTION INTRAMUSCULAR; INTRAVENOUS; SUBCUTANEOUS at 00:52

## 2023-01-01 RX ADMIN — INSULIN LISPRO 6 UNITS: 100 INJECTION, SOLUTION INTRAVENOUS; SUBCUTANEOUS at 17:08

## 2023-01-01 RX ADMIN — TAMSULOSIN HYDROCHLORIDE 0.4 MG: 0.4 CAPSULE ORAL at 22:11

## 2023-01-01 RX ADMIN — SODIUM CHLORIDE, POTASSIUM CHLORIDE, SODIUM LACTATE AND CALCIUM CHLORIDE: 600; 310; 30; 20 INJECTION, SOLUTION INTRAVENOUS at 06:24

## 2023-01-01 RX ADMIN — INSULIN LISPRO 6 UNITS: 100 INJECTION, SOLUTION INTRAVENOUS; SUBCUTANEOUS at 11:21

## 2023-01-01 RX ADMIN — FAMOTIDINE 20 MG: 20 TABLET, FILM COATED ORAL at 11:52

## 2023-01-01 RX ADMIN — ROSUVASTATIN CALCIUM 10 MG: 10 TABLET, COATED ORAL at 22:10

## 2023-01-01 RX ADMIN — INSULIN LISPRO 4 UNITS: 100 INJECTION, SOLUTION INTRAVENOUS; SUBCUTANEOUS at 16:51

## 2023-01-01 RX ADMIN — SODIUM CHLORIDE, POTASSIUM CHLORIDE, SODIUM LACTATE AND CALCIUM CHLORIDE: 600; 310; 30; 20 INJECTION, SOLUTION INTRAVENOUS at 20:19

## 2023-01-01 RX ADMIN — SODIUM CHLORIDE, POTASSIUM CHLORIDE, SODIUM LACTATE AND CALCIUM CHLORIDE: 600; 310; 30; 20 INJECTION, SOLUTION INTRAVENOUS at 03:05

## 2023-01-01 RX ADMIN — SODIUM CHLORIDE, PRESERVATIVE FREE 10 ML: 5 INJECTION INTRAVENOUS at 22:05

## 2023-01-01 RX ADMIN — METOPROLOL TARTRATE 12.5 MG: 25 TABLET, FILM COATED ORAL at 20:40

## 2023-01-01 RX ADMIN — SODIUM CHLORIDE, PRESERVATIVE FREE 10 ML: 5 INJECTION INTRAVENOUS at 20:14

## 2023-01-01 RX ADMIN — MEMANTINE HYDROCHLORIDE 10 MG: 10 TABLET ORAL at 09:02

## 2023-01-01 RX ADMIN — QUETIAPINE FUMARATE 50 MG: 25 TABLET ORAL at 01:57

## 2023-01-01 RX ADMIN — TAMSULOSIN HYDROCHLORIDE 0.4 MG: 0.4 CAPSULE ORAL at 21:17

## 2023-01-01 RX ADMIN — ENOXAPARIN SODIUM 40 MG: 100 INJECTION SUBCUTANEOUS at 08:59

## 2023-01-01 RX ADMIN — FAMOTIDINE 20 MG: 20 TABLET, FILM COATED ORAL at 09:17

## 2023-01-01 RX ADMIN — SODIUM CHLORIDE, PRESERVATIVE FREE 5 ML: 5 INJECTION INTRAVENOUS at 21:27

## 2023-01-01 RX ADMIN — SODIUM CHLORIDE, PRESERVATIVE FREE 10 ML: 5 INJECTION INTRAVENOUS at 21:05

## 2023-01-01 RX ADMIN — FAMOTIDINE 20 MG: 20 TABLET, FILM COATED ORAL at 12:32

## 2023-01-01 RX ADMIN — MEMANTINE HYDROCHLORIDE 10 MG: 10 TABLET ORAL at 12:14

## 2023-01-01 RX ADMIN — ENOXAPARIN SODIUM 40 MG: 100 INJECTION SUBCUTANEOUS at 09:40

## 2023-01-01 RX ADMIN — INSULIN LISPRO 2 UNITS: 100 INJECTION, SOLUTION INTRAVENOUS; SUBCUTANEOUS at 11:42

## 2023-01-01 RX ADMIN — METOPROLOL TARTRATE 12.5 MG: 25 TABLET, FILM COATED ORAL at 09:42

## 2023-01-01 RX ADMIN — FAMOTIDINE 20 MG: 20 TABLET, FILM COATED ORAL at 09:10

## 2023-01-01 RX ADMIN — SODIUM CHLORIDE, POTASSIUM CHLORIDE, SODIUM LACTATE AND CALCIUM CHLORIDE: 600; 310; 30; 20 INJECTION, SOLUTION INTRAVENOUS at 17:15

## 2023-01-01 RX ADMIN — FAMOTIDINE 20 MG: 20 TABLET, FILM COATED ORAL at 12:15

## 2023-01-01 RX ADMIN — FAMOTIDINE 20 MG: 20 TABLET, FILM COATED ORAL at 17:16

## 2023-01-01 RX ADMIN — SODIUM CHLORIDE, PRESERVATIVE FREE 10 ML: 5 INJECTION INTRAVENOUS at 22:31

## 2023-01-01 RX ADMIN — MEMANTINE HYDROCHLORIDE 10 MG: 10 TABLET ORAL at 07:55

## 2023-01-01 RX ADMIN — ENOXAPARIN SODIUM 40 MG: 100 INJECTION SUBCUTANEOUS at 09:51

## 2023-01-01 RX ADMIN — ROSUVASTATIN CALCIUM 10 MG: 10 TABLET, COATED ORAL at 20:25

## 2023-01-01 RX ADMIN — GABAPENTIN 200 MG: 100 CAPSULE ORAL at 21:15

## 2023-01-01 RX ADMIN — LORAZEPAM 1 MG: 2 INJECTION INTRAMUSCULAR; INTRAVENOUS at 22:42

## 2023-01-01 RX ADMIN — INSULIN LISPRO 2 UNITS: 100 INJECTION, SOLUTION INTRAVENOUS; SUBCUTANEOUS at 23:33

## 2023-01-01 RX ADMIN — TAMSULOSIN HYDROCHLORIDE 0.4 MG: 0.4 CAPSULE ORAL at 20:12

## 2023-01-01 RX ADMIN — ROSUVASTATIN CALCIUM 10 MG: 10 TABLET, COATED ORAL at 21:05

## 2023-01-01 RX ADMIN — GABAPENTIN 100 MG: 100 CAPSULE ORAL at 09:42

## 2023-01-01 RX ADMIN — FAMOTIDINE 20 MG: 20 TABLET, FILM COATED ORAL at 10:55

## 2023-01-01 RX ADMIN — METOPROLOL TARTRATE 12.5 MG: 25 TABLET, FILM COATED ORAL at 22:32

## 2023-01-01 RX ADMIN — TAMSULOSIN HYDROCHLORIDE 0.4 MG: 0.4 CAPSULE ORAL at 21:49

## 2023-01-01 RX ADMIN — ASPIRIN 81 MG: 81 TABLET, COATED ORAL at 10:13

## 2023-01-01 RX ADMIN — LORAZEPAM 2 MG: 2 INJECTION INTRAMUSCULAR; INTRAVENOUS at 06:12

## 2023-01-01 RX ADMIN — POTASSIUM CHLORIDE 10 MEQ: 7.46 INJECTION, SOLUTION INTRAVENOUS at 17:11

## 2023-01-01 RX ADMIN — SODIUM CHLORIDE, POTASSIUM CHLORIDE, SODIUM LACTATE AND CALCIUM CHLORIDE: 600; 310; 30; 20 INJECTION, SOLUTION INTRAVENOUS at 06:45

## 2023-01-01 ASSESSMENT — PAIN SCALES - PAIN ASSESSMENT IN ADVANCED DEMENTIA (PAINAD)
CONSOLABILITY: 0
TOTALSCORE: 0
BODYLANGUAGE: 0
BODYLANGUAGE: 0
FACIALEXPRESSION: 0
TOTALSCORE: 0
BODYLANGUAGE: 0
BREATHING: 0
BREATHING: 0
TOTALSCORE: 0
CONSOLABILITY: 0
CONSOLABILITY: 0
NEGVOCALIZATION: 0
TOTALSCORE: 0
NEGVOCALIZATION: 0
TOTALSCORE: 0
BODYLANGUAGE: 0
BODYLANGUAGE: 0
CONSOLABILITY: 0
BREATHING: 0
TOTALSCORE: 0
BODYLANGUAGE: 0
NEGVOCALIZATION: 0
NEGVOCALIZATION: 0
BREATHING: 0
FACIALEXPRESSION: 0
BODYLANGUAGE: 0
TOTALSCORE: 0
FACIALEXPRESSION: 0
NEGVOCALIZATION: 0
BREATHING: 0
CONSOLABILITY: 0
NEGVOCALIZATION: 0
CONSOLABILITY: 0
BREATHING: 0
CONSOLABILITY: 0
FACIALEXPRESSION: 0
FACIALEXPRESSION: 0
NEGVOCALIZATION: 0
BREATHING: 0
TOTALSCORE: 0
CONSOLABILITY: 0
BODYLANGUAGE: 0
FACIALEXPRESSION: 0
BREATHING: 0
NEGVOCALIZATION: 0

## 2023-01-01 ASSESSMENT — PAIN SCALES - GENERAL
PAINLEVEL_OUTOF10: 0
PAINLEVEL_OUTOF10: 4
PAINLEVEL_OUTOF10: 0
PAINLEVEL_OUTOF10: 5
PAINLEVEL_OUTOF10: 0
PAINLEVEL_OUTOF10: 7
PAINLEVEL_OUTOF10: 0

## 2023-01-01 ASSESSMENT — PAIN SCALES - WONG BAKER

## 2023-01-01 ASSESSMENT — ENCOUNTER SYMPTOMS
BOWEL INCONTINENCE: 1
SPUTUM COLOR: TAN
EYES NEGATIVE: 1
ALLERGIC/IMMUNOLOGIC NEGATIVE: 1
SPUTUM PRODUCTION: 1
RESPIRATORY NEGATIVE: 1
BOWEL INCONTINENCE: 1
SNORING: 1
SPUTUM AMOUNT: MODERATE
ABDOMINAL PAIN: 1
TROUBLE SWALLOWING: 1
HEMOPTYSIS: 0
SPUTUM CONSISTENCY: THICK
SNORING: 1
BOWEL INCONTINENCE: 1
TROUBLE SWALLOWING: 1
BOWEL INCONTINENCE: 1

## 2023-01-01 ASSESSMENT — PAIN DESCRIPTION - ORIENTATION
ORIENTATION: ANTERIOR;MID
ORIENTATION: MID;ANTERIOR

## 2023-01-01 ASSESSMENT — PAIN DESCRIPTION - PAIN TYPE
TYPE: CHRONIC PAIN
TYPE: CHRONIC PAIN

## 2023-01-01 ASSESSMENT — PAIN DESCRIPTION - ONSET
ONSET: ON-GOING
ONSET: ON-GOING

## 2023-01-01 ASSESSMENT — LIFESTYLE VARIABLES
HOW MANY STANDARD DRINKS CONTAINING ALCOHOL DO YOU HAVE ON A TYPICAL DAY: PATIENT DOES NOT DRINK
HOW OFTEN DO YOU HAVE A DRINK CONTAINING ALCOHOL: NEVER

## 2023-01-01 ASSESSMENT — PAIN - FUNCTIONAL ASSESSMENT
PAIN_FUNCTIONAL_ASSESSMENT: ACTIVITIES ARE NOT PREVENTED
PAIN_FUNCTIONAL_ASSESSMENT: ACTIVITIES ARE NOT PREVENTED

## 2023-01-01 ASSESSMENT — PAIN DESCRIPTION - FREQUENCY
FREQUENCY: INTERMITTENT
FREQUENCY: INTERMITTENT

## 2023-01-01 ASSESSMENT — PAIN DESCRIPTION - DESCRIPTORS
DESCRIPTORS: ACHING
DESCRIPTORS: ACHING

## 2023-01-01 ASSESSMENT — PAIN DESCRIPTION - LOCATION
LOCATION: ABDOMEN
LOCATION: ABDOMEN

## 2023-01-07 ENCOUNTER — APPOINTMENT (OUTPATIENT)
Dept: CT IMAGING | Age: 78
End: 2023-01-07
Attending: EMERGENCY MEDICINE
Payer: MEDICARE

## 2023-01-07 ENCOUNTER — HOSPITAL ENCOUNTER (EMERGENCY)
Age: 78
Discharge: HOME | End: 2023-01-07
Attending: EMERGENCY MEDICINE
Payer: MEDICARE

## 2023-01-07 VITALS
RESPIRATION RATE: 18 BRPM | HEART RATE: 62 BPM | SYSTOLIC BLOOD PRESSURE: 160 MMHG | OXYGEN SATURATION: 98 % | HEIGHT: 64 IN | TEMPERATURE: 97.5 F | DIASTOLIC BLOOD PRESSURE: 78 MMHG | BODY MASS INDEX: 29.88 KG/M2 | WEIGHT: 175 LBS

## 2023-01-07 DIAGNOSIS — K80.20 GALLSTONES: Primary | ICD-10-CM

## 2023-01-07 DIAGNOSIS — I71.43 INFRARENAL ABDOMINAL AORTIC ANEURYSM (AAA) WITHOUT RUPTURE: ICD-10-CM

## 2023-01-07 LAB
ALBUMIN SERPL-MCNC: 3.8 G/DL (ref 3.4–5)
ALBUMIN/GLOB SERPL: 1.1 (ref 0.8–1.7)
ALP SERPL-CCNC: 89 U/L (ref 45–117)
ALT SERPL-CCNC: 39 U/L (ref 16–61)
ANION GAP SERPL CALC-SCNC: 6 MMOL/L (ref 3–18)
APPEARANCE UR: CLEAR
AST SERPL-CCNC: 41 U/L (ref 10–38)
BASOPHILS # BLD: 0 K/UL (ref 0–0.1)
BASOPHILS NFR BLD: 1 % (ref 0–2)
BILIRUB SERPL-MCNC: 0.5 MG/DL (ref 0.2–1)
BILIRUB UR QL: NEGATIVE
BUN SERPL-MCNC: 17 MG/DL (ref 7–18)
BUN/CREAT SERPL: 12 (ref 12–20)
CALCIUM SERPL-MCNC: 9.1 MG/DL (ref 8.5–10.1)
CHLORIDE SERPL-SCNC: 107 MMOL/L (ref 100–111)
CO2 SERPL-SCNC: 29 MMOL/L (ref 21–32)
COLOR UR: YELLOW
CREAT SERPL-MCNC: 1.43 MG/DL (ref 0.6–1.3)
DIFFERENTIAL METHOD BLD: NORMAL
EOSINOPHIL # BLD: 0.1 K/UL (ref 0–0.4)
EOSINOPHIL NFR BLD: 3 % (ref 0–5)
ERYTHROCYTE [DISTWIDTH] IN BLOOD BY AUTOMATED COUNT: 13.3 % (ref 11.6–14.5)
GLOBULIN SER CALC-MCNC: 3.5 G/DL (ref 2–4)
GLUCOSE SERPL-MCNC: 185 MG/DL (ref 74–99)
GLUCOSE UR STRIP.AUTO-MCNC: >1000 MG/DL
HCT VFR BLD AUTO: 38.9 % (ref 36–48)
HGB BLD-MCNC: 13 G/DL (ref 13–16)
HGB UR QL STRIP: NEGATIVE
IMM GRANULOCYTES # BLD AUTO: 0 K/UL (ref 0–0.04)
IMM GRANULOCYTES NFR BLD AUTO: 0 % (ref 0–0.5)
KETONES UR QL STRIP.AUTO: NEGATIVE MG/DL
LEUKOCYTE ESTERASE UR QL STRIP.AUTO: NEGATIVE
LIPASE SERPL-CCNC: 234 U/L (ref 73–393)
LYMPHOCYTES # BLD: 1 K/UL (ref 0.9–3.6)
LYMPHOCYTES NFR BLD: 21 % (ref 21–52)
MCH RBC QN AUTO: 29.2 PG (ref 24–34)
MCHC RBC AUTO-ENTMCNC: 33.4 G/DL (ref 31–37)
MCV RBC AUTO: 87.4 FL (ref 78–100)
MONOCYTES # BLD: 0.4 K/UL (ref 0.05–1.2)
MONOCYTES NFR BLD: 9 % (ref 3–10)
NEUTS SEG # BLD: 3 K/UL (ref 1.8–8)
NEUTS SEG NFR BLD: 65 % (ref 40–73)
NITRITE UR QL STRIP.AUTO: NEGATIVE
NRBC # BLD: 0 K/UL (ref 0–0.01)
NRBC BLD-RTO: 0 PER 100 WBC
PH UR STRIP: 5 (ref 5–8)
PLATELET # BLD AUTO: 149 K/UL (ref 135–420)
PMV BLD AUTO: 10.1 FL (ref 9.2–11.8)
POTASSIUM SERPL-SCNC: 3.9 MMOL/L (ref 3.5–5.5)
PROT SERPL-MCNC: 7.3 G/DL (ref 6.4–8.2)
PROT UR STRIP-MCNC: NEGATIVE MG/DL
RBC # BLD AUTO: 4.45 M/UL (ref 4.35–5.65)
SODIUM SERPL-SCNC: 142 MMOL/L (ref 136–145)
SP GR UR REFRACTOMETRY: 1.03 (ref 1–1.03)
UROBILINOGEN UR QL STRIP.AUTO: 0.2 EU/DL (ref 0.2–1)
WBC # BLD AUTO: 4.6 K/UL (ref 4.6–13.2)

## 2023-01-07 PROCEDURE — 81003 URINALYSIS AUTO W/O SCOPE: CPT

## 2023-01-07 PROCEDURE — 85025 COMPLETE CBC W/AUTO DIFF WBC: CPT

## 2023-01-07 PROCEDURE — 83690 ASSAY OF LIPASE: CPT

## 2023-01-07 PROCEDURE — 99284 EMERGENCY DEPT VISIT MOD MDM: CPT

## 2023-01-07 PROCEDURE — 80053 COMPREHEN METABOLIC PANEL: CPT

## 2023-01-07 PROCEDURE — 74176 CT ABD & PELVIS W/O CONTRAST: CPT

## 2023-01-07 NOTE — ED PROVIDER NOTES
The patient is a 70-year-old male with past medical history significant for coronary artery disease, carotid artery stenosis, dementia, diabetes, hyperlipidemia, and hypertension, who presents the ED today with abdominal pain. He has not been able to provide a history secondary to dementia. His son states that the patient called him frantically this morning. The patient's wife is also unable to provide any information. Past Medical History:   Diagnosis Date    CAD (coronary artery disease)     Aortic Stenosis    Carotid artery stenosis     Dementia (HCC)     Diabetes (HCC)     Glaucoma     High cholesterol     Hypertension     Ill-defined condition     Bilateral Carotid Artery Stenosis       Past Surgical History:   Procedure Laterality Date    [place] UNLISTED PROCEDURE CARDIAC SURGERY      bypass         Family History:   Problem Relation Age of Onset    Diabetes Other        Social History     Socioeconomic History    Marital status:      Spouse name: Not on file    Number of children: Not on file    Years of education: Not on file    Highest education level: Not on file   Occupational History    Not on file   Tobacco Use    Smoking status: Never    Smokeless tobacco: Never   Substance and Sexual Activity    Alcohol use: No    Drug use: No    Sexual activity: Not on file   Other Topics Concern    Not on file   Social History Narrative    Not on file     Social Determinants of Health     Financial Resource Strain: Not on file   Food Insecurity: Not on file   Transportation Needs: Not on file   Physical Activity: Not on file   Stress: Not on file   Social Connections: Not on file   Intimate Partner Violence: Not on file   Housing Stability: Not on file         ALLERGIES: Patient has no known allergies. Review of Systems   All other systems reviewed and are negative.     Vitals:    01/07/23 0930 01/07/23 1051   BP: (!) 154/80 125/83   Pulse: 65 (!) 58   Resp: 18 18   Temp: 97.5 °F (36.4 °C)    SpO2: 100% 100%   Weight: 79.4 kg (175 lb)    Height: 5' 4\" (1.626 m)             Physical Exam  Vitals and nursing note reviewed. Constitutional:       Appearance: He is well-developed. HENT:      Head: Normocephalic and atraumatic. Mouth/Throat:      Mouth: Mucous membranes are moist.      Pharynx: Oropharynx is clear. Eyes:      Extraocular Movements: Extraocular movements intact. Pupils: Pupils are equal, round, and reactive to light. Cardiovascular:      Rate and Rhythm: Normal rate and regular rhythm. Comments: Click over the aortic valve  Pulmonary:      Effort: Pulmonary effort is normal.      Breath sounds: Normal breath sounds. Abdominal:      General: Abdomen is protuberant. Bowel sounds are normal.      Palpations: Abdomen is soft. Tenderness: There is no abdominal tenderness. Skin:     General: Skin is warm and dry. Capillary Refill: Capillary refill takes less than 2 seconds. Neurological:      Mental Status: He is alert and oriented to person, place, and time.    Psychiatric:         Mood and Affect: Mood normal.         Behavior: Behavior normal.      Recent Results (from the past 12 hour(s))   URINALYSIS W/ RFLX MICROSCOPIC    Collection Time: 01/07/23 10:56 AM   Result Value Ref Range    Color YELLOW      Appearance CLEAR      Specific gravity 1.029 1.005 - 1.030      pH (UA) 5.0 5.0 - 8.0      Protein Negative NEG mg/dL    Glucose >1,000 (A) NEG mg/dL    Ketone Negative NEG mg/dL    Bilirubin Negative NEG      Blood Negative NEG      Urobilinogen 0.2 0.2 - 1.0 EU/dL    Nitrites Negative NEG      Leukocyte Esterase Negative NEG     CBC WITH AUTOMATED DIFF    Collection Time: 01/07/23 11:30 AM   Result Value Ref Range    WBC 4.6 4.6 - 13.2 K/uL    RBC 4.45 4.35 - 5.65 M/uL    HGB 13.0 13.0 - 16.0 g/dL    HCT 38.9 36.0 - 48.0 %    MCV 87.4 78.0 - 100.0 FL    MCH 29.2 24.0 - 34.0 PG    MCHC 33.4 31.0 - 37.0 g/dL    RDW 13.3 11.6 - 14.5 %    PLATELET 306 240 - 065 K/uL MPV 10.1 9.2 - 11.8 FL    NRBC 0.0 0  WBC    ABSOLUTE NRBC 0.00 0.00 - 0.01 K/uL    NEUTROPHILS 65 40 - 73 %    LYMPHOCYTES 21 21 - 52 %    MONOCYTES 9 3 - 10 %    EOSINOPHILS 3 0 - 5 %    BASOPHILS 1 0 - 2 %    IMMATURE GRANULOCYTES 0 0.0 - 0.5 %    ABS. NEUTROPHILS 3.0 1.8 - 8.0 K/UL    ABS. LYMPHOCYTES 1.0 0.9 - 3.6 K/UL    ABS. MONOCYTES 0.4 0.05 - 1.2 K/UL    ABS. EOSINOPHILS 0.1 0.0 - 0.4 K/UL    ABS. BASOPHILS 0.0 0.0 - 0.1 K/UL    ABS. IMM. GRANS. 0.0 0.00 - 0.04 K/UL    DF AUTOMATED     METABOLIC PANEL, COMPREHENSIVE    Collection Time: 01/07/23 11:30 AM   Result Value Ref Range    Sodium 142 136 - 145 mmol/L    Potassium 3.9 3.5 - 5.5 mmol/L    Chloride 107 100 - 111 mmol/L    CO2 29 21 - 32 mmol/L    Anion gap 6 3.0 - 18 mmol/L    Glucose 185 (H) 74 - 99 mg/dL    BUN 17 7.0 - 18 MG/DL    Creatinine 1.43 (H) 0.6 - 1.3 MG/DL    BUN/Creatinine ratio 12 12 - 20      eGFR 50 (L) >60 ml/min/1.73m2    Calcium 9.1 8.5 - 10.1 MG/DL    Bilirubin, total 0.5 0.2 - 1.0 MG/DL    ALT (SGPT) 39 16 - 61 U/L    AST (SGOT) 41 (H) 10 - 38 U/L    Alk. phosphatase 89 45 - 117 U/L    Protein, total 7.3 6.4 - 8.2 g/dL    Albumin 3.8 3.4 - 5.0 g/dL    Globulin 3.5 2.0 - 4.0 g/dL    A-G Ratio 1.1 0.8 - 1.7     LIPASE    Collection Time: 01/07/23 11:30 AM   Result Value Ref Range    Lipase 234 73 - 393 U/L     CT ABD PELV WO CONT   Final Result      Cholelithiasis; no significant inflammatory changes. Biliary ducts are   nondilated. There is no obstructive uropathy. The bowel is unremarkable and the appendix is normal.      Infrarenal abdominal aorta aneurysm with AP diameter of 3.5 cm. Possible old granulomatous disease in the spleen. Calcified pleural plaques, bilaterally; possible asbestos-related pleural   disease.       Aortic valve repair.         ===================   Note: [facility] maintains that all CT scans at their facilities   are performed by using dose optimization technique as appropriate to a performed   examination, to include automated exposure control, adjustment of the mAs and/or   kVp according to patient size (including appropriate matching for site specific   examinations) or use of iterative reconstruction technique. Medical Decision Making  The patient is a 77-year-old male with multiple medical problems including diabetes and dementia, who was brought to the ED today by his son for abdominal pain. The patient is unable to provide more of a history. The son states that the patient called him frantically this morning. On exam the patient is nontoxic appearing and resting comfortably. The patient is resting comfortably and he forgot why he is here. He is antsy to go home. He would also like to eat. The patient is labs are within normal limits. His CT of his abdomen pelvis shows cholelithiasis and an infrarenal abdominal aortic aneurysm with a diameter of 3.5 cm. He will be discharged home and advised to follow-up with general surgery and vascular surgery for these issues. He should also follow-up with his primary care doctor in 2 to 3 days. Return precautions have been given. Amount and/or Complexity of Data Reviewed  Labs: ordered. Radiology: ordered.            Procedures

## 2023-01-07 NOTE — ED TRIAGE NOTES
Pt is wheeled to triage with concerns for abdominal pain, constipation, nausea, and vomiting for the last \"few days\". States last BM was yesterday.

## 2023-01-07 NOTE — ED NOTES
Pain assessment on discharge was 0/10. Condition stable. Family present during discharge. Patient education was completed. Patient education was taught to patient using discussion and handout. Patient verbalized understanding. Patient was discharged to home by self and family. Patient was discharged via wheelchair staff assit.

## 2023-01-07 NOTE — ED NOTES
I have introduced myself to the patient and family,  and discussed anticipated plan of care. Patient resting quietly on stretcher in low and locked position. Call bell in reach. Side rail up x1. Patient c/o intermittent stabbing pain to lower abdomen bilaterally.

## 2023-01-07 NOTE — ED NOTES
Family to desk requesting update; waiting on CT. Family states that patient is getting impatient and would like to leave, will wait 30 more minutes. Radiology called to follow up on results.

## 2023-01-16 ENCOUNTER — APPOINTMENT (OUTPATIENT)
Dept: CT IMAGING | Age: 78
End: 2023-01-16
Attending: PHYSICIAN ASSISTANT
Payer: MEDICARE

## 2023-01-16 ENCOUNTER — APPOINTMENT (OUTPATIENT)
Dept: GENERAL RADIOLOGY | Age: 78
End: 2023-01-16
Attending: PHYSICIAN ASSISTANT
Payer: MEDICARE

## 2023-01-16 ENCOUNTER — HOSPITAL ENCOUNTER (EMERGENCY)
Age: 78
Discharge: HOME | End: 2023-01-16
Attending: EMERGENCY MEDICINE
Payer: MEDICARE

## 2023-01-16 VITALS — DIASTOLIC BLOOD PRESSURE: 78 MMHG | SYSTOLIC BLOOD PRESSURE: 147 MMHG | HEART RATE: 59 BPM | OXYGEN SATURATION: 96 %

## 2023-01-16 DIAGNOSIS — R40.4 TRANSIENT ALTERATION OF AWARENESS: Primary | ICD-10-CM

## 2023-01-16 LAB
ALBUMIN SERPL-MCNC: 3.6 G/DL (ref 3.4–5)
ALBUMIN/GLOB SERPL: 1.1 (ref 0.8–1.7)
ALP SERPL-CCNC: 83 U/L (ref 45–117)
ALT SERPL-CCNC: 28 U/L (ref 16–61)
ANION GAP SERPL CALC-SCNC: 4 MMOL/L (ref 3–18)
APPEARANCE UR: CLEAR
AST SERPL-CCNC: 23 U/L (ref 10–38)
BACTERIA URNS QL MICRO: NEGATIVE /HPF
BASOPHILS # BLD: 0.1 K/UL (ref 0–0.1)
BASOPHILS NFR BLD: 1 % (ref 0–2)
BILIRUB SERPL-MCNC: 0.5 MG/DL (ref 0.2–1)
BILIRUB UR QL: NEGATIVE
BNP SERPL-MCNC: 120 PG/ML (ref 0–1800)
BUN SERPL-MCNC: 15 MG/DL (ref 7–18)
BUN/CREAT SERPL: 10 (ref 12–20)
CALCIUM SERPL-MCNC: 9 MG/DL (ref 8.5–10.1)
CHLORIDE SERPL-SCNC: 107 MMOL/L (ref 100–111)
CO2 SERPL-SCNC: 28 MMOL/L (ref 21–32)
COLOR UR: ABNORMAL
CREAT SERPL-MCNC: 1.51 MG/DL (ref 0.6–1.3)
DIFFERENTIAL METHOD BLD: ABNORMAL
EOSINOPHIL # BLD: 0.2 K/UL (ref 0–0.4)
EOSINOPHIL NFR BLD: 2 % (ref 0–5)
EPITH CASTS URNS QL MICRO: NORMAL /LPF (ref 0–5)
ERYTHROCYTE [DISTWIDTH] IN BLOOD BY AUTOMATED COUNT: 13.2 % (ref 11.6–14.5)
GLOBULIN SER CALC-MCNC: 3.2 G/DL (ref 2–4)
GLUCOSE SERPL-MCNC: 108 MG/DL (ref 74–99)
GLUCOSE UR STRIP.AUTO-MCNC: >1000 MG/DL
HCT VFR BLD AUTO: 39 % (ref 36–48)
HGB BLD-MCNC: 13.2 G/DL (ref 13–16)
HGB UR QL STRIP: NEGATIVE
IMM GRANULOCYTES # BLD AUTO: 0 K/UL (ref 0–0.04)
IMM GRANULOCYTES NFR BLD AUTO: 0 % (ref 0–0.5)
KETONES UR QL STRIP.AUTO: NEGATIVE MG/DL
LEUKOCYTE ESTERASE UR QL STRIP.AUTO: NEGATIVE
LYMPHOCYTES # BLD: 1.4 K/UL (ref 0.9–3.6)
LYMPHOCYTES NFR BLD: 19 % (ref 21–52)
MAGNESIUM SERPL-MCNC: 2.1 MG/DL (ref 1.6–2.6)
MCH RBC QN AUTO: 28.9 PG (ref 24–34)
MCHC RBC AUTO-ENTMCNC: 33.8 G/DL (ref 31–37)
MCV RBC AUTO: 85.5 FL (ref 78–100)
MONOCYTES # BLD: 0.8 K/UL (ref 0.05–1.2)
MONOCYTES NFR BLD: 11 % (ref 3–10)
NEUTS SEG # BLD: 4.7 K/UL (ref 1.8–8)
NEUTS SEG NFR BLD: 66 % (ref 40–73)
NITRITE UR QL STRIP.AUTO: NEGATIVE
NRBC # BLD: 0 K/UL (ref 0–0.01)
NRBC BLD-RTO: 0 PER 100 WBC
PH UR STRIP: 5.5 (ref 5–8)
PLATELET # BLD AUTO: 142 K/UL (ref 135–420)
PMV BLD AUTO: 9.9 FL (ref 9.2–11.8)
POTASSIUM SERPL-SCNC: 3.8 MMOL/L (ref 3.5–5.5)
PROT SERPL-MCNC: 6.8 G/DL (ref 6.4–8.2)
PROT UR STRIP-MCNC: ABNORMAL MG/DL
RBC # BLD AUTO: 4.56 M/UL (ref 4.35–5.65)
RBC #/AREA URNS HPF: NORMAL /HPF (ref 0–5)
SODIUM SERPL-SCNC: 139 MMOL/L (ref 136–145)
SP GR UR REFRACTOMETRY: >1.03 (ref 1–1.03)
TROPONIN-HIGH SENSITIVITY: 10 NG/L (ref 0–78)
UROBILINOGEN UR QL STRIP.AUTO: 1 EU/DL (ref 0.2–1)
WBC # BLD AUTO: 7 K/UL (ref 4.6–13.2)
WBC URNS QL MICRO: NORMAL /HPF (ref 0–4)

## 2023-01-16 PROCEDURE — 81001 URINALYSIS AUTO W/SCOPE: CPT

## 2023-01-16 PROCEDURE — 83880 ASSAY OF NATRIURETIC PEPTIDE: CPT

## 2023-01-16 PROCEDURE — 80053 COMPREHEN METABOLIC PANEL: CPT

## 2023-01-16 PROCEDURE — 71045 X-RAY EXAM CHEST 1 VIEW: CPT

## 2023-01-16 PROCEDURE — 99284 EMERGENCY DEPT VISIT MOD MDM: CPT

## 2023-01-16 PROCEDURE — 83735 ASSAY OF MAGNESIUM: CPT

## 2023-01-16 PROCEDURE — 70450 CT HEAD/BRAIN W/O DYE: CPT

## 2023-01-16 PROCEDURE — 85025 COMPLETE CBC W/AUTO DIFF WBC: CPT

## 2023-01-16 PROCEDURE — 84484 ASSAY OF TROPONIN QUANT: CPT

## 2023-01-16 PROCEDURE — 72125 CT NECK SPINE W/O DYE: CPT

## 2023-01-16 NOTE — ED TRIAGE NOTES
Patient via EMS c/o \"passing out on the couch\". Patient states he felt like he was going to lose his balance so he went and laid down on the couch. Per EMS, his wife thought he was resting but was difficult to arouse so she called 911. Patient states he does not remember anything prior to lying down, other than feeling unsteady on his feet. Denies any HA, vision, or speech changes. No CP or SOB. . Pt speaks with a delay but per EMS wife stated that was his baseline.

## 2023-01-17 NOTE — ED PROVIDER NOTES
The patient is a 64-year-old male who was brought to the ED today by EMS after reportedly passing out the couch. The patient's wife stated that he felt like he was going to lose his balance and he went down on the couch. He appeared sleepier than usual and was difficult to arouse and so she called 911. The patient has a history of dementia and does not remember any of the events today. I did speak to the patient's son, who is the caretaker for both his mother and his father, and he stated that he received a frantic phone call from his mother stating that his father was less responsive. The patient's son states that his mother also has a certain degree of dementia. He was not there and did not witness the episode today. They are trying to get the patient and the patient's wife to the brother's house so that they have more supervision and care.        Past Medical History:   Diagnosis Date    CAD (coronary artery disease)     Aortic Stenosis    Carotid artery stenosis     Dementia (HCC)     Diabetes (HCC)     Glaucoma     High cholesterol     Hypertension     Ill-defined condition     Bilateral Carotid Artery Stenosis       Past Surgical History:   Procedure Laterality Date    SC UNLISTED PROCEDURE CARDIAC SURGERY      bypass         Family History:   Problem Relation Age of Onset    Diabetes Other        Social History     Socioeconomic History    Marital status:      Spouse name: Not on file    Number of children: Not on file    Years of education: Not on file    Highest education level: Not on file   Occupational History    Not on file   Tobacco Use    Smoking status: Never    Smokeless tobacco: Never   Substance and Sexual Activity    Alcohol use: No    Drug use: No    Sexual activity: Not on file   Other Topics Concern    Not on file   Social History Narrative    Not on file     Social Determinants of Health     Financial Resource Strain: Not on file   Food Insecurity: Not on file Transportation Needs: Not on file   Physical Activity: Not on file   Stress: Not on file   Social Connections: Not on file   Intimate Partner Violence: Not on file   Housing Stability: Not on file         ALLERGIES: Patient has no known allergies. Review of Systems   All other systems reviewed and are negative. Vitals:    01/16/23 1849 01/16/23 1904   BP: (!) 155/71 (!) 147/78   Pulse: 63 (!) 59   SpO2: 97% 96%            Physical Exam  Vitals and nursing note reviewed. Constitutional:       Comments: Does not really recall the events of today   HENT:      Head: Normocephalic and atraumatic. Right Ear: External ear normal.      Left Ear: External ear normal.      Nose: Nose normal.      Mouth/Throat:      Mouth: Mucous membranes are moist.      Pharynx: Oropharynx is clear. Eyes:      Extraocular Movements: Extraocular movements intact. Conjunctiva/sclera: Conjunctivae normal.      Pupils: Pupils are equal, round, and reactive to light. Cardiovascular:      Rate and Rhythm: Normal rate and regular rhythm. Pulses: Normal pulses. Heart sounds: Normal heart sounds. Pulmonary:      Effort: Pulmonary effort is normal.      Breath sounds: Normal breath sounds. Abdominal:      General: Abdomen is flat. Bowel sounds are normal.      Palpations: Abdomen is soft. Musculoskeletal:         General: Normal range of motion. Cervical back: Normal range of motion and neck supple. Right lower leg: Edema present. Left lower leg: Edema present. Comments: Very mild bilateral lower extremity edema   Skin:     General: Skin is warm and dry. Capillary Refill: Capillary refill takes less than 2 seconds. Neurological:      Mental Status: He is alert. Mental status is at baseline.    Psychiatric:      Comments: Cooperative but anxious to leave      Recent Results (from the past 12 hour(s))   CBC WITH AUTOMATED DIFF    Collection Time: 01/16/23  7:00 PM   Result Value Ref Range WBC 7.0 4.6 - 13.2 K/uL    RBC 4.56 4.35 - 5.65 M/uL    HGB 13.2 13.0 - 16.0 g/dL    HCT 39.0 36.0 - 48.0 %    MCV 85.5 78.0 - 100.0 FL    MCH 28.9 24.0 - 34.0 PG    MCHC 33.8 31.0 - 37.0 g/dL    RDW 13.2 11.6 - 14.5 %    PLATELET 485 141 - 411 K/uL    MPV 9.9 9.2 - 11.8 FL    NRBC 0.0 0  WBC    ABSOLUTE NRBC 0.00 0.00 - 0.01 K/uL    NEUTROPHILS 66 40 - 73 %    LYMPHOCYTES 19 (L) 21 - 52 %    MONOCYTES 11 (H) 3 - 10 %    EOSINOPHILS 2 0 - 5 %    BASOPHILS 1 0 - 2 %    IMMATURE GRANULOCYTES 0 0.0 - 0.5 %    ABS. NEUTROPHILS 4.7 1.8 - 8.0 K/UL    ABS. LYMPHOCYTES 1.4 0.9 - 3.6 K/UL    ABS. MONOCYTES 0.8 0.05 - 1.2 K/UL    ABS. EOSINOPHILS 0.2 0.0 - 0.4 K/UL    ABS. BASOPHILS 0.1 0.0 - 0.1 K/UL    ABS. IMM. GRANS. 0.0 0.00 - 0.04 K/UL    DF AUTOMATED     METABOLIC PANEL, COMPREHENSIVE    Collection Time: 01/16/23  7:00 PM   Result Value Ref Range    Sodium 139 136 - 145 mmol/L    Potassium 3.8 3.5 - 5.5 mmol/L    Chloride 107 100 - 111 mmol/L    CO2 28 21 - 32 mmol/L    Anion gap 4 3.0 - 18 mmol/L    Glucose 108 (H) 74 - 99 mg/dL    BUN 15 7.0 - 18 MG/DL    Creatinine 1.51 (H) 0.6 - 1.3 MG/DL    BUN/Creatinine ratio 10 (L) 12 - 20      eGFR 47 (L) >60 ml/min/1.73m2    Calcium 9.0 8.5 - 10.1 MG/DL    Bilirubin, total 0.5 0.2 - 1.0 MG/DL    ALT (SGPT) 28 16 - 61 U/L    AST (SGOT) 23 10 - 38 U/L    Alk.  phosphatase 83 45 - 117 U/L    Protein, total 6.8 6.4 - 8.2 g/dL    Albumin 3.6 3.4 - 5.0 g/dL    Globulin 3.2 2.0 - 4.0 g/dL    A-G Ratio 1.1 0.8 - 1.7     MAGNESIUM    Collection Time: 01/16/23  7:00 PM   Result Value Ref Range    Magnesium 2.1 1.6 - 2.6 mg/dL   TROPONIN-HIGH SENSITIVITY    Collection Time: 01/16/23  7:00 PM   Result Value Ref Range    Troponin-High Sensitivity 10 0 - 78 ng/L   NT-PRO BNP    Collection Time: 01/16/23  7:00 PM   Result Value Ref Range    NT pro- 0 - 1,800 PG/ML   URINALYSIS W/ RFLX MICROSCOPIC    Collection Time: 01/16/23  8:19 PM   Result Value Ref Range    Color DARK YELLOW      Appearance CLEAR      Specific gravity >1.030 (H) 1.003 - 1.030    pH (UA) 5.5 5.0 - 8.0      Protein TRACE (A) NEG mg/dL    Glucose >1,000 (A) NEG mg/dL    Ketone Negative NEG mg/dL    Bilirubin Negative NEG      Blood Negative NEG      Urobilinogen 1.0 0.2 - 1.0 EU/dL    Nitrites Negative NEG      Leukocyte Esterase Negative NEG     URINE MICROSCOPIC ONLY    Collection Time: 01/16/23  8:19 PM   Result Value Ref Range    WBC 0 to 1 0 - 4 /hpf    RBC 0 to 1 0 - 5 /hpf    Epithelial cells 0 to 1 0 - 5 /lpf    Bacteria Negative NEG /hpf     CT HEAD WO CONT   Final Result   1. No acute process is identified. 2.  Similar mild white matter microvascular changes and global parenchymal   volume loss. CT SPINE CERV WO CONT   Final Result   No fracture identified. XR CHEST SNGL V   Final Result   1. No acute pulmonary process. Asbestosis related pleural disease. Medical Decision Making  The patient is a 64-year-old male with past medical history significant for dementia, who was brought to the ED today by EMS after having an episode where he was less responsive per the patient's wife. The patient's work-up is fairly benign. He has no acute processes inside of his head. He does not have a C-spine fracture. He does not have pneumonia. CBC is unremarkable. His electrolytes are normal.  His creatinine is slightly elevated but close to his baseline. He is awake alert and at his mental status baseline. He will be discharged home and advised to follow-up with his primary care physician in 2 to 3 days. Return precautions have been given. Amount and/or Complexity of Data Reviewed  Labs: ordered.            Procedures

## 2023-01-18 ENCOUNTER — TELEPHONE (OUTPATIENT)
Dept: SURGERY | Age: 78
End: 2023-01-18

## 2023-01-25 ENCOUNTER — HOSPITAL ENCOUNTER (EMERGENCY)
Age: 78
Discharge: HOME | End: 2023-01-26
Attending: EMERGENCY MEDICINE | Admitting: INTERNAL MEDICINE
Payer: MEDICARE

## 2023-01-25 ENCOUNTER — HOSPITAL ENCOUNTER (EMERGENCY)
Age: 78
Discharge: HOME | End: 2023-01-25
Attending: EMERGENCY MEDICINE
Payer: MEDICARE

## 2023-01-25 ENCOUNTER — APPOINTMENT (OUTPATIENT)
Dept: GENERAL RADIOLOGY | Age: 78
End: 2023-01-25
Attending: EMERGENCY MEDICINE
Payer: MEDICARE

## 2023-01-25 ENCOUNTER — APPOINTMENT (OUTPATIENT)
Dept: CT IMAGING | Age: 78
End: 2023-01-25
Attending: EMERGENCY MEDICINE
Payer: MEDICARE

## 2023-01-25 VITALS
RESPIRATION RATE: 9 BRPM | TEMPERATURE: 98 F | SYSTOLIC BLOOD PRESSURE: 129 MMHG | HEART RATE: 57 BPM | DIASTOLIC BLOOD PRESSURE: 78 MMHG | OXYGEN SATURATION: 97 %

## 2023-01-25 DIAGNOSIS — R55 SYNCOPE AND COLLAPSE: Primary | ICD-10-CM

## 2023-01-25 DIAGNOSIS — E11.65 TYPE 2 DIABETES MELLITUS WITH HYPERGLYCEMIA, WITHOUT LONG-TERM CURRENT USE OF INSULIN (HCC): ICD-10-CM

## 2023-01-25 DIAGNOSIS — I10 HYPERTENSION, UNSPECIFIED TYPE: ICD-10-CM

## 2023-01-25 DIAGNOSIS — F03.90 DEMENTIA, UNSPECIFIED DEMENTIA SEVERITY, UNSPECIFIED DEMENTIA TYPE, UNSPECIFIED WHETHER BEHAVIORAL, PSYCHOTIC, OR MOOD DISTURBANCE OR ANXIETY (HCC): ICD-10-CM

## 2023-01-25 DIAGNOSIS — R41.0 ACUTE CONFUSION: Primary | ICD-10-CM

## 2023-01-25 DIAGNOSIS — R41.0 DELIRIUM: ICD-10-CM

## 2023-01-25 DIAGNOSIS — R94.31 QT PROLONGATION: ICD-10-CM

## 2023-01-25 LAB
ALBUMIN SERPL-MCNC: 3.2 G/DL (ref 3.4–5)
ALBUMIN/GLOB SERPL: 1.1 (ref 0.8–1.7)
ALP SERPL-CCNC: 90 U/L (ref 45–117)
ALT SERPL-CCNC: 29 U/L (ref 16–61)
ANION GAP SERPL CALC-SCNC: 6 MMOL/L (ref 3–18)
APPEARANCE UR: CLEAR
AST SERPL-CCNC: 35 U/L (ref 10–38)
BASOPHILS # BLD: 0 K/UL (ref 0–0.1)
BASOPHILS NFR BLD: 1 % (ref 0–2)
BILIRUB SERPL-MCNC: 0.6 MG/DL (ref 0.2–1)
BILIRUB UR QL: NEGATIVE
BNP SERPL-MCNC: 135 PG/ML (ref 0–1800)
BUN SERPL-MCNC: 18 MG/DL (ref 7–18)
BUN/CREAT SERPL: 13 (ref 12–20)
CALCIUM SERPL-MCNC: 8.6 MG/DL (ref 8.5–10.1)
CHLORIDE SERPL-SCNC: 110 MMOL/L (ref 100–111)
CO2 SERPL-SCNC: 27 MMOL/L (ref 21–32)
COLOR UR: YELLOW
CREAT SERPL-MCNC: 1.34 MG/DL (ref 0.6–1.3)
DIFFERENTIAL METHOD BLD: ABNORMAL
EOSINOPHIL # BLD: 0.2 K/UL (ref 0–0.4)
EOSINOPHIL NFR BLD: 4 % (ref 0–5)
ERYTHROCYTE [DISTWIDTH] IN BLOOD BY AUTOMATED COUNT: 13.2 % (ref 11.6–14.5)
GLOBULIN SER CALC-MCNC: 2.9 G/DL (ref 2–4)
GLUCOSE SERPL-MCNC: 226 MG/DL (ref 74–99)
GLUCOSE UR STRIP.AUTO-MCNC: >1000 MG/DL
HCT VFR BLD AUTO: 35.3 % (ref 36–48)
HGB BLD-MCNC: 11.7 G/DL (ref 13–16)
HGB UR QL STRIP: NEGATIVE
IMM GRANULOCYTES # BLD AUTO: 0 K/UL (ref 0–0.04)
IMM GRANULOCYTES NFR BLD AUTO: 0 % (ref 0–0.5)
KETONES UR QL STRIP.AUTO: NEGATIVE MG/DL
LEUKOCYTE ESTERASE UR QL STRIP.AUTO: NEGATIVE
LYMPHOCYTES # BLD: 1 K/UL (ref 0.9–3.6)
LYMPHOCYTES NFR BLD: 20 % (ref 21–52)
MCH RBC QN AUTO: 29.5 PG (ref 24–34)
MCHC RBC AUTO-ENTMCNC: 33.1 G/DL (ref 31–37)
MCV RBC AUTO: 88.9 FL (ref 78–100)
MONOCYTES # BLD: 0.5 K/UL (ref 0.05–1.2)
MONOCYTES NFR BLD: 10 % (ref 3–10)
NEUTS SEG # BLD: 3.3 K/UL (ref 1.8–8)
NEUTS SEG NFR BLD: 65 % (ref 40–73)
NITRITE UR QL STRIP.AUTO: NEGATIVE
NRBC # BLD: 0 K/UL (ref 0–0.01)
NRBC BLD-RTO: 0 PER 100 WBC
PH UR STRIP: 5.5 (ref 5–8)
PLATELET # BLD AUTO: 140 K/UL (ref 135–420)
PMV BLD AUTO: 9.8 FL (ref 9.2–11.8)
POTASSIUM SERPL-SCNC: 3.7 MMOL/L (ref 3.5–5.5)
PROT SERPL-MCNC: 6.1 G/DL (ref 6.4–8.2)
PROT UR STRIP-MCNC: NEGATIVE MG/DL
RBC # BLD AUTO: 3.97 M/UL (ref 4.35–5.65)
SODIUM SERPL-SCNC: 143 MMOL/L (ref 136–145)
SP GR UR REFRACTOMETRY: 1.03 (ref 1–1.03)
TROPONIN-HIGH SENSITIVITY: 12 NG/L (ref 0–78)
UROBILINOGEN UR QL STRIP.AUTO: 0.2 EU/DL (ref 0.2–1)
WBC # BLD AUTO: 5.1 K/UL (ref 4.6–13.2)

## 2023-01-25 PROCEDURE — 84484 ASSAY OF TROPONIN QUANT: CPT

## 2023-01-25 PROCEDURE — 99285 EMERGENCY DEPT VISIT HI MDM: CPT

## 2023-01-25 PROCEDURE — 93005 ELECTROCARDIOGRAM TRACING: CPT

## 2023-01-25 PROCEDURE — 71045 X-RAY EXAM CHEST 1 VIEW: CPT

## 2023-01-25 PROCEDURE — 85025 COMPLETE CBC W/AUTO DIFF WBC: CPT

## 2023-01-25 PROCEDURE — 70450 CT HEAD/BRAIN W/O DYE: CPT

## 2023-01-25 PROCEDURE — 80053 COMPREHEN METABOLIC PANEL: CPT

## 2023-01-25 PROCEDURE — 84443 ASSAY THYROID STIM HORMONE: CPT

## 2023-01-25 PROCEDURE — 83880 ASSAY OF NATRIURETIC PEPTIDE: CPT

## 2023-01-25 PROCEDURE — 81003 URINALYSIS AUTO W/O SCOPE: CPT

## 2023-01-25 NOTE — ED PROVIDER NOTES
EMERGENCY DEPARTMENT HISTORY AND PHYSICAL EXAM    Date: 1/25/2023  Patient Name: Adelita Resendez    History of Presenting Illness     Chief Complaint   Patient presents with    Altered mental status         History Provided By:       Additional History (Context): Adelita Resendez is a 76 y.o. male with a history of dementia, diabetes who presents to the emergency department with altered mental status. It is unclear exactly how long this has been ongoing however it sounds like the patient's son is someone who called 911 this morning to have the patient brought to the emergency department. At this time, the patient really does not have any complaints, states that he knows that he is in the hospital, not oriented to time. He states currently he is not having any abdominal pain, reports no nausea, denies any headache, reports no visual symptoms, states he does not think he has had any issues urinating recently, reports no weakness to his upper or lower extremities. PCP: Yolie Paul MD    Current Outpatient Medications   Medication Sig Dispense Refill    Zinc Mth/Copper/Saw Palm/Gnsg (PROSTATE HEALTH FORMULA PO) Take  by mouth.      empagliflozin (Jardiance) 25 mg tablet Take 25 mg by mouth daily. metFORMIN (GLUCOPHAGE) 500 mg tablet Take 500 mg by mouth daily (with breakfast). glimepiride (AMARYL) 4 mg tablet Take 4 mg by mouth every morning. metoprolol tartrate (LOPRESSOR) 25 mg tablet Take 0.5 Tabs by mouth two (2) times a day. 30 Tab 0    glucose blood VI test strips (Accu-Chek Tracee Plus test strp) strip       Accu-Chek Tracee Plus test strp strip       clopidogreL (PLAVIX) 75 mg tab Take 75 mg by mouth daily. donepeziL (ARICEPT) 10 mg tablet Take 10 mg by mouth nightly. lancets (Accu-Chek Softclix Lancets) misc       aspirin delayed-release 81 mg tablet Take 81 mg by mouth daily.       ezetimibe-simvastatin (VYTORIN 10/40) 10-40 mg per tablet Take 1 Tab by mouth nightly. FIBER CHOICE PO Take 1 Tab by mouth daily. MULTIVITAMIN W/IRON, MINERALS (MULTIVITAMIN AND MINERALS PO) Take 1 Tab by mouth daily. OTHER,NON-FORMULARY, Indications: Move Free joint care         Past History     Past Medical History:  Past Medical History:   Diagnosis Date    CAD (coronary artery disease)     Aortic Stenosis    Carotid artery stenosis     Dementia (HCC)     Diabetes (HCC)     Glaucoma     High cholesterol     Hypertension     Ill-defined condition     Bilateral Carotid Artery Stenosis       Past Surgical History:  Past Surgical History:   Procedure Laterality Date    DC UNLISTED PROCEDURE CARDIAC SURGERY      bypass       Family History:  Family History   Problem Relation Age of Onset    Diabetes Other        Social History:  Social History     Tobacco Use    Smoking status: Never    Smokeless tobacco: Never   Substance Use Topics    Alcohol use: No    Drug use: No       Allergies:  No Known Allergies      Review of Systems   Review of Systems   Constitutional:  Positive for fatigue. Negative for chills and fever. Respiratory:  Negative for cough and shortness of breath. Cardiovascular:  Negative for chest pain and palpitations. Gastrointestinal:  Negative for abdominal pain, nausea and vomiting. Genitourinary:  Negative for difficulty urinating, dysuria and flank pain. All Other Systems Negative  Physical Exam     Vitals:    01/25/23 0600 01/25/23 0615 01/25/23 0830 01/25/23 1130   BP: (!) 155/66 (!) 150/68 138/63 129/78   Pulse: (!) 54 (!) 59 (!) 54 (!) 57   Resp: 13 16 13 9   Temp:   98 °F (36.7 °C)    SpO2: 97%        Physical Exam  Constitutional:       Appearance: Normal appearance. He is normal weight. HENT:      Head: Normocephalic and atraumatic. Right Ear: External ear normal.      Left Ear: External ear normal.      Nose: Nose normal.      Mouth/Throat:      Pharynx: Oropharynx is clear.    Eyes:      Extraocular Movements: Extraocular movements intact. Conjunctiva/sclera: Conjunctivae normal.   Cardiovascular:      Rate and Rhythm: Normal rate and regular rhythm. Pulses: Normal pulses. Heart sounds: Normal heart sounds. Pulmonary:      Effort: Pulmonary effort is normal.      Breath sounds: Normal breath sounds. Abdominal:      General: Abdomen is flat. Bowel sounds are normal.      Palpations: Abdomen is soft. Musculoskeletal:         General: Normal range of motion. Cervical back: Normal range of motion and neck supple. Skin:     General: Skin is warm and dry. Capillary Refill: Capillary refill takes less than 2 seconds. Neurological:      General: No focal deficit present. Mental Status: He is alert. Mental status is at baseline. He is confused. Cranial Nerves: Cranial nerves 2-12 are intact. Motor: No weakness. Comments: Patient is alert, oriented to self and the fact that he is in a hospital, not oriented to date or time   Psychiatric:         Mood and Affect: Mood normal.         Behavior: Behavior normal.         Thought Content: Thought content normal.         Judgment: Judgment normal.         Diagnostic Study Results     Labs -     Recent Results (from the past 12 hour(s))   CBC WITH AUTOMATED DIFF    Collection Time: 01/25/23  6:11 AM   Result Value Ref Range    WBC 5.1 4.6 - 13.2 K/uL    RBC 3.97 (L) 4.35 - 5.65 M/uL    HGB 11.7 (L) 13.0 - 16.0 g/dL    HCT 35.3 (L) 36.0 - 48.0 %    MCV 88.9 78.0 - 100.0 FL    MCH 29.5 24.0 - 34.0 PG    MCHC 33.1 31.0 - 37.0 g/dL    RDW 13.2 11.6 - 14.5 %    PLATELET 295 312 - 363 K/uL    MPV 9.8 9.2 - 11.8 FL    NRBC 0.0 0  WBC    ABSOLUTE NRBC 0.00 0.00 - 0.01 K/uL    NEUTROPHILS 65 40 - 73 %    LYMPHOCYTES 20 (L) 21 - 52 %    MONOCYTES 10 3 - 10 %    EOSINOPHILS 4 0 - 5 %    BASOPHILS 1 0 - 2 %    IMMATURE GRANULOCYTES 0 0.0 - 0.5 %    ABS. NEUTROPHILS 3.3 1.8 - 8.0 K/UL    ABS. LYMPHOCYTES 1.0 0.9 - 3.6 K/UL    ABS.  MONOCYTES 0.5 0.05 - 1.2 K/UL ABS. EOSINOPHILS 0.2 0.0 - 0.4 K/UL    ABS. BASOPHILS 0.0 0.0 - 0.1 K/UL    ABS. IMM. GRANS. 0.0 0.00 - 0.04 K/UL    DF AUTOMATED     METABOLIC PANEL, COMPREHENSIVE    Collection Time: 01/25/23  6:11 AM   Result Value Ref Range    Sodium 143 136 - 145 mmol/L    Potassium 3.7 3.5 - 5.5 mmol/L    Chloride 110 100 - 111 mmol/L    CO2 27 21 - 32 mmol/L    Anion gap 6 3.0 - 18 mmol/L    Glucose 226 (H) 74 - 99 mg/dL    BUN 18 7.0 - 18 MG/DL    Creatinine 1.34 (H) 0.6 - 1.3 MG/DL    BUN/Creatinine ratio 13 12 - 20      eGFR 54 (L) >60 ml/min/1.73m2    Calcium 8.6 8.5 - 10.1 MG/DL    Bilirubin, total 0.6 0.2 - 1.0 MG/DL    ALT (SGPT) 29 16 - 61 U/L    AST (SGOT) 35 10 - 38 U/L    Alk.  phosphatase 90 45 - 117 U/L    Protein, total 6.1 (L) 6.4 - 8.2 g/dL    Albumin 3.2 (L) 3.4 - 5.0 g/dL    Globulin 2.9 2.0 - 4.0 g/dL    A-G Ratio 1.1 0.8 - 1.7     NT-PRO BNP    Collection Time: 01/25/23  6:11 AM   Result Value Ref Range    NT pro- 0 - 1,800 PG/ML   TROPONIN-HIGH SENSITIVITY    Collection Time: 01/25/23  6:11 AM   Result Value Ref Range    Troponin-High Sensitivity 12 0 - 78 ng/L   EKG, 12 LEAD, INITIAL    Collection Time: 01/25/23  6:43 AM   Result Value Ref Range    Ventricular Rate 57 BPM    Atrial Rate 57 BPM    QRS Duration 90 ms    Q-T Interval 498 ms    QTC Calculation (Bezet) 484 ms    Calculated T Axis 5 degrees    Diagnosis       Junctional rhythm  Moderate voltage criteria for LVH, may be normal variant  Prolonged QT  Abnormal ECG  When compared with ECG of 31-AUG-2022 16:10,  Junctional rhythm has replaced Sinus rhythm     URINALYSIS W/ RFLX MICROSCOPIC    Collection Time: 01/25/23 10:24 AM   Result Value Ref Range    Color YELLOW      Appearance CLEAR      Specific gravity 1.028 1.005 - 1.030      pH (UA) 5.5 5.0 - 8.0      Protein Negative NEG mg/dL    Glucose >1,000 (A) NEG mg/dL    Ketone Negative NEG mg/dL    Bilirubin Negative NEG      Blood Negative NEG      Urobilinogen 0.2 0.2 - 1.0 EU/dL Nitrites Negative NEG      Leukocyte Esterase Negative NEG         Radiologic Studies -   CT HEAD WO CONT   Final Result      No acute intracranial findings. Stable chronic senescent changes. XR CHEST PORT   Final Result   1. Asbestosis related pleural disease. No definite acute pulmonary process. CT Results  (Last 48 hours)                 01/25/23 0800  CT HEAD WO CONT Final result    Impression:      No acute intracranial findings. Stable chronic senescent changes. Narrative:  CT OF THE HEAD WITHOUT CONTRAST. CLINICAL HISTORY: Altered mental status. TECHNIQUE: Helical scan obtained of the head were obtained from the skull vertex   through the base of the skull without intravenous contrast.    All CT scans are   performed using dose optimization techniques as appropriate to the performed   exam including the following: Automated exposure control, adjustment of mA   and/or kV according to patient size, and use of iterative reconstructive   technique. COMPARISON: 1/16/2023. FINDINGS:        Stable sulcal pattern with chronic atrophy and ex vacuo compensatory enlargement   of the lateral and third ventricles. Minimal periventricular white matter   hypodensities. No new findings. No intracranial hemorrhage. No mass effect. The   visualized paranasal sinuses are clear. The mastoid air cells are clear. The   visualized bony structures are unremarkable. CXR Results  (Last 48 hours)                 01/25/23 0617  XR CHEST PORT Final result    Impression:  1. Asbestosis related pleural disease. No definite acute pulmonary process. Narrative:      Examination: Portable AP chest        History: Altered mental status       Comparison: January 16, 2023       Findings: Lungs are grossly clear. There are extensive asbestosis related   pleural calcifications. The heart is mildly enlarged in size. There is been   prior CABG.                      Medical Decision Making   I am the first provider for this patient. I reviewed the vital signs, available nursing notes, past medical history, past surgical history, family history and social history. Vital Signs-Reviewed the patient's vital signs. Records Reviewed: Nursing Notes and Old Medical Records     Procedures: None   Procedures    Provider Notes (Medical Decision Making):   68-year-old man presented to the emerged department by EMS with altered mental status. He does have a history of dementia, currently without any complaints. It is difficult to assess exactly what his change from baseline is at this time without family input. We will cast a wide net to help rule out infectious causes such as urinary tract infection, pneumonia. His neurologic exam is relatively nonfocal but will still proceed with CT scan of his head to rule out possible subdural or subacute stroke. Head CT does not show anything acute, blood work is unremarkable, normal white blood cell count, chest x-ray does not show any pulmonary process. I spoke with the son who came to the emergency department, he states that his father was very difficult to arouse this morning and said he was feeling short of breath. He states both his mother and father have dementia and is not uncommon for his father to have decreased mental status for a couple of hours before recovering spontaneously. The son states that he seems to be back to his baseline discussed effectively do not have any obvious cause for his acute change however that this is not terribly uncommon in people with dementia. The son states that he feels comfortable with him returning to the home, they are working on trying to find a more stable living situation for them both. MED RECONCILIATION:  No current facility-administered medications for this encounter.      Current Outpatient Medications   Medication Sig    Zinc Mth/Copper/Saw Palm/Gnsg (PROSTATE HEALTH FORMULA PO) Take  by mouth.    empagliflozin (Jardiance) 25 mg tablet Take 25 mg by mouth daily. metFORMIN (GLUCOPHAGE) 500 mg tablet Take 500 mg by mouth daily (with breakfast). glimepiride (AMARYL) 4 mg tablet Take 4 mg by mouth every morning. metoprolol tartrate (LOPRESSOR) 25 mg tablet Take 0.5 Tabs by mouth two (2) times a day. glucose blood VI test strips (Accu-Chek Tracee Plus test strp) strip     Accu-Chek Tracee Plus test strp strip     clopidogreL (PLAVIX) 75 mg tab Take 75 mg by mouth daily. donepeziL (ARICEPT) 10 mg tablet Take 10 mg by mouth nightly. lancets (Accu-Chek Softclix Lancets) misc     aspirin delayed-release 81 mg tablet Take 81 mg by mouth daily. ezetimibe-simvastatin (VYTORIN 10/40) 10-40 mg per tablet Take 1 Tab by mouth nightly. FIBER CHOICE PO Take 1 Tab by mouth daily. MULTIVITAMIN W/IRON, MINERALS (MULTIVITAMIN AND MINERALS PO) Take 1 Tab by mouth daily. OTHER,NON-FORMULARY, Indications: Move Free joint care       Disposition:  Home     DISCHARGE NOTE:   Pt has been reexamined. Patient has no new complaints, changes, or physical findings. Care plan outlined and precautions discussed. Results of workup were reviewed with the patient. All medications were reviewed with the patient. All of pt's questions and concerns were addressed. Patient was instructed and agrees to follow up with PCP as well as to return to the ED upon further deterioration. Patient is ready to go home. Follow-up Information       Follow up With Specialties Details Why Contact Info    Anne Muller MD Internal Medicine Physician  As needed [phone] [facility]  [phone]              Discharge Medication List as of 1/25/2023 11:33 AM              Diagnosis     Clinical Impression:   1. Acute confusion          \"Please note that this dictation was completed with [employer], the [employer] voice recognition software.  Quite often unanticipated grammatical, syntax, homophones, and other interpretive errors are inadvertently transcribed by the computer software. Please disregard these errors. Please excuse any errors that have escaped final proofreading. \"

## 2023-01-26 VITALS
BODY MASS INDEX: 29.88 KG/M2 | SYSTOLIC BLOOD PRESSURE: 161 MMHG | WEIGHT: 175 LBS | TEMPERATURE: 98.4 F | HEIGHT: 64 IN | RESPIRATION RATE: 16 BRPM | HEART RATE: 54 BPM | DIASTOLIC BLOOD PRESSURE: 72 MMHG | OXYGEN SATURATION: 96 %

## 2023-01-26 PROBLEM — F32.A DEPRESSION: Chronic | Status: ACTIVE | Noted: 2023-01-26

## 2023-01-26 PROBLEM — I73.9 PVD (PERIPHERAL VASCULAR DISEASE) (HCC): Chronic | Status: ACTIVE | Noted: 2023-01-26

## 2023-01-26 PROBLEM — F32.A DEPRESSION: Status: ACTIVE | Noted: 2023-01-26

## 2023-01-26 PROBLEM — E78.5 HYPERLIPIDEMIA: Status: ACTIVE | Noted: 2023-01-26

## 2023-01-26 PROBLEM — I10 HYPERTENSION: Status: ACTIVE | Noted: 2023-01-26

## 2023-01-26 PROBLEM — N40.0 BPH (BENIGN PROSTATIC HYPERPLASIA): Status: ACTIVE | Noted: 2023-01-26

## 2023-01-26 PROBLEM — R94.31 QT PROLONGATION: Status: ACTIVE | Noted: 2023-01-26

## 2023-01-26 PROBLEM — E11.9 TYPE 2 DIABETES MELLITUS, WITHOUT LONG-TERM CURRENT USE OF INSULIN (HCC): Chronic | Status: ACTIVE | Noted: 2023-01-26

## 2023-01-26 PROBLEM — Z95.1 S/P CABG (CORONARY ARTERY BYPASS GRAFT): Status: ACTIVE | Noted: 2023-01-26

## 2023-01-26 PROBLEM — Z95.2 S/P TAVR (TRANSCATHETER AORTIC VALVE REPLACEMENT): Chronic | Status: ACTIVE | Noted: 2023-01-26

## 2023-01-26 PROBLEM — I73.9 PVD (PERIPHERAL VASCULAR DISEASE) (HCC): Status: ACTIVE | Noted: 2023-01-26

## 2023-01-26 PROBLEM — I10 HYPERTENSION: Chronic | Status: ACTIVE | Noted: 2023-01-26

## 2023-01-26 PROBLEM — Z95.1 S/P CABG (CORONARY ARTERY BYPASS GRAFT): Chronic | Status: ACTIVE | Noted: 2023-01-26

## 2023-01-26 PROBLEM — F03.90 DEMENTIA (HCC): Chronic | Status: ACTIVE | Noted: 2023-01-26

## 2023-01-26 PROBLEM — F03.90 DEMENTIA (HCC): Status: ACTIVE | Noted: 2023-01-01

## 2023-01-26 PROBLEM — E78.5 HYPERLIPIDEMIA: Chronic | Status: ACTIVE | Noted: 2023-01-26

## 2023-01-26 PROBLEM — N40.0 BPH (BENIGN PROSTATIC HYPERPLASIA): Chronic | Status: ACTIVE | Noted: 2023-01-26

## 2023-01-26 PROBLEM — Z95.2 S/P TAVR (TRANSCATHETER AORTIC VALVE REPLACEMENT): Status: ACTIVE | Noted: 2023-01-26

## 2023-01-26 PROBLEM — E11.9 TYPE 2 DIABETES MELLITUS, WITHOUT LONG-TERM CURRENT USE OF INSULIN (HCC): Status: ACTIVE | Noted: 2023-01-26

## 2023-01-26 LAB
ALBUMIN SERPL-MCNC: 3.4 G/DL (ref 3.4–5)
ALBUMIN/GLOB SERPL: 1.2 (ref 0.8–1.7)
ALP SERPL-CCNC: 76 U/L (ref 45–117)
ALT SERPL-CCNC: 28 U/L (ref 16–61)
AMMONIA PLAS-SCNC: 30 UMOL/L (ref 11–32)
ANION GAP SERPL CALC-SCNC: 4 MMOL/L (ref 3–18)
ARTERIAL PATENCY WRIST A: POSITIVE
AST SERPL-CCNC: 21 U/L (ref 10–38)
ATRIAL RATE: 57 BPM
B PERT DNA SPEC QL NAA+PROBE: NOT DETECTED
BASE EXCESS BLD CALC-SCNC: 0.4 MMOL/L
BASOPHILS # BLD: 0 K/UL (ref 0–0.1)
BASOPHILS NFR BLD: 0 % (ref 0–2)
BDY SITE: ABNORMAL
BILIRUB SERPL-MCNC: 0.4 MG/DL (ref 0.2–1)
BORDETELLA PARAPERTUSSIS PCR, BORPAR: NOT DETECTED
BUN SERPL-MCNC: 18 MG/DL (ref 7–18)
BUN/CREAT SERPL: 14 (ref 12–20)
C PNEUM DNA SPEC QL NAA+PROBE: NOT DETECTED
CALCIUM SERPL-MCNC: 8.8 MG/DL (ref 8.5–10.1)
CALCULATED T AXIS, ECG11: 5 DEGREES
CHLORIDE SERPL-SCNC: 110 MMOL/L (ref 100–111)
CO2 SERPL-SCNC: 28 MMOL/L (ref 21–32)
CREAT SERPL-MCNC: 1.28 MG/DL (ref 0.6–1.3)
DIAGNOSIS, 93000: NORMAL
DIFFERENTIAL METHOD BLD: ABNORMAL
EOSINOPHIL # BLD: 0.2 K/UL (ref 0–0.4)
EOSINOPHIL NFR BLD: 4 % (ref 0–5)
ERYTHROCYTE [DISTWIDTH] IN BLOOD BY AUTOMATED COUNT: 13.3 % (ref 11.6–14.5)
FLUAV H1 2009 PAND RNA SPEC QL NAA+PROBE: NOT DETECTED
FLUAV H1 RNA SPEC QL NAA+PROBE: NOT DETECTED
FLUAV H3 RNA SPEC QL NAA+PROBE: NOT DETECTED
FLUAV SUBTYP SPEC NAA+PROBE: NOT DETECTED
FLUBV RNA SPEC QL NAA+PROBE: NOT DETECTED
GAS FLOW.O2 O2 DELIVERY SYS: ABNORMAL
GLOBULIN SER CALC-MCNC: 2.9 G/DL (ref 2–4)
GLUCOSE BLD STRIP.AUTO-MCNC: 117 MG/DL (ref 70–110)
GLUCOSE SERPL-MCNC: 225 MG/DL (ref 74–99)
HADV DNA SPEC QL NAA+PROBE: NOT DETECTED
HCO3 BLD-SCNC: 25.1 MMOL/L (ref 22–26)
HCOV 229E RNA SPEC QL NAA+PROBE: NOT DETECTED
HCOV HKU1 RNA SPEC QL NAA+PROBE: NOT DETECTED
HCOV NL63 RNA SPEC QL NAA+PROBE: NOT DETECTED
HCOV OC43 RNA SPEC QL NAA+PROBE: NOT DETECTED
HCT VFR BLD AUTO: 35.7 % (ref 36–48)
HGB BLD-MCNC: 11.9 G/DL (ref 13–16)
HMPV RNA SPEC QL NAA+PROBE: NOT DETECTED
HPIV1 RNA SPEC QL NAA+PROBE: NOT DETECTED
HPIV2 RNA SPEC QL NAA+PROBE: NOT DETECTED
HPIV3 RNA SPEC QL NAA+PROBE: NOT DETECTED
HPIV4 RNA SPEC QL NAA+PROBE: NOT DETECTED
IMM GRANULOCYTES # BLD AUTO: 0 K/UL (ref 0–0.04)
IMM GRANULOCYTES NFR BLD AUTO: 0 % (ref 0–0.5)
LACTATE SERPL-SCNC: 1.2 MMOL/L (ref 0.4–2)
LYMPHOCYTES # BLD: 1.1 K/UL (ref 0.9–3.6)
LYMPHOCYTES NFR BLD: 21 % (ref 21–52)
M PNEUMO DNA SPEC QL NAA+PROBE: NOT DETECTED
MCH RBC QN AUTO: 29.2 PG (ref 24–34)
MCHC RBC AUTO-ENTMCNC: 33.3 G/DL (ref 31–37)
MCV RBC AUTO: 87.5 FL (ref 78–100)
MONOCYTES # BLD: 0.6 K/UL (ref 0.05–1.2)
MONOCYTES NFR BLD: 11 % (ref 3–10)
NEUTS SEG # BLD: 3.4 K/UL (ref 1.8–8)
NEUTS SEG NFR BLD: 64 % (ref 40–73)
NRBC # BLD: 0 K/UL (ref 0–0.01)
NRBC BLD-RTO: 0 PER 100 WBC
O2/TOTAL GAS SETTING VFR VENT: 21 %
PCO2 BLD: 39.9 MMHG (ref 35–45)
PH BLD: 7.41 (ref 7.35–7.45)
PLATELET # BLD AUTO: 142 K/UL (ref 135–420)
PMV BLD AUTO: 10.3 FL (ref 9.2–11.8)
PO2 BLD: 72 MMHG (ref 80–100)
POTASSIUM SERPL-SCNC: 3.4 MMOL/L (ref 3.5–5.5)
PROT SERPL-MCNC: 6.3 G/DL (ref 6.4–8.2)
Q-T INTERVAL, ECG07: 498 MS
QRS DURATION, ECG06: 90 MS
QTC CALCULATION (BEZET), ECG08: 484 MS
RBC # BLD AUTO: 4.08 M/UL (ref 4.35–5.65)
RSV RNA SPEC QL NAA+PROBE: NOT DETECTED
RV+EV RNA SPEC QL NAA+PROBE: NOT DETECTED
SAO2 % BLD: 94.3 % (ref 92–97)
SARS-COV-2 PCR, COVPCR: NOT DETECTED
SERVICE CMNT-IMP: ABNORMAL
SODIUM SERPL-SCNC: 142 MMOL/L (ref 136–145)
SPECIMEN TYPE: ABNORMAL
TOTAL RESP. RATE, ITRR: 14
TROPONIN-HIGH SENSITIVITY: 11 NG/L (ref 0–78)
TSH SERPL DL<=0.05 MIU/L-ACNC: 3.2 UIU/ML (ref 0.36–3.74)
VENTRICULAR RATE, ECG03: 57 BPM
WBC # BLD AUTO: 5.2 K/UL (ref 4.6–13.2)

## 2023-01-26 PROCEDURE — 36600 WITHDRAWAL OF ARTERIAL BLOOD: CPT

## 2023-01-26 PROCEDURE — 82140 ASSAY OF AMMONIA: CPT

## 2023-01-26 PROCEDURE — 83605 ASSAY OF LACTIC ACID: CPT

## 2023-01-26 PROCEDURE — 0202U NFCT DS 22 TRGT SARS-COV-2: CPT

## 2023-01-26 PROCEDURE — 99222 1ST HOSP IP/OBS MODERATE 55: CPT | Performed by: INTERNAL MEDICINE

## 2023-01-26 PROCEDURE — 82803 BLOOD GASES ANY COMBINATION: CPT

## 2023-01-26 PROCEDURE — 82962 GLUCOSE BLOOD TEST: CPT

## 2023-01-26 PROCEDURE — 65270000029 HC RM PRIVATE

## 2023-01-26 RX ORDER — MEMANTINE HYDROCHLORIDE 5 MG/1
5 TABLET ORAL DAILY
COMMUNITY

## 2023-01-26 RX ORDER — ROSUVASTATIN CALCIUM 20 MG/1
20 TABLET, COATED ORAL
Status: DISCONTINUED | OUTPATIENT
Start: 2023-01-26 | End: 2023-01-26 | Stop reason: HOSPADM

## 2023-01-26 RX ORDER — ROSUVASTATIN CALCIUM 20 MG/1
20 TABLET, COATED ORAL
COMMUNITY

## 2023-01-26 RX ORDER — METFORMIN HYDROCHLORIDE 500 MG/1
500 TABLET ORAL
COMMUNITY

## 2023-01-26 RX ORDER — ENOXAPARIN SODIUM 100 MG/ML
40 INJECTION SUBCUTANEOUS DAILY
Status: DISCONTINUED | OUTPATIENT
Start: 2023-01-26 | End: 2023-01-26 | Stop reason: HOSPADM

## 2023-01-26 RX ORDER — CHOLECALCIFEROL (VITAMIN D3) 125 MCG
5 CAPSULE ORAL
Status: DISCONTINUED | OUTPATIENT
Start: 2023-01-26 | End: 2023-01-26 | Stop reason: HOSPADM

## 2023-01-26 RX ORDER — MEMANTINE HYDROCHLORIDE 5 MG/1
5 TABLET ORAL DAILY
Status: DISCONTINUED | OUTPATIENT
Start: 2023-01-26 | End: 2023-01-26 | Stop reason: HOSPADM

## 2023-01-26 RX ORDER — ACETAMINOPHEN 325 MG/1
650 TABLET ORAL
Status: DISCONTINUED | OUTPATIENT
Start: 2023-01-26 | End: 2023-01-26 | Stop reason: HOSPADM

## 2023-01-26 RX ORDER — SODIUM CHLORIDE 0.9 % (FLUSH) 0.9 %
5-40 SYRINGE (ML) INJECTION AS NEEDED
Status: DISCONTINUED | OUTPATIENT
Start: 2023-01-26 | End: 2023-01-26 | Stop reason: HOSPADM

## 2023-01-26 RX ORDER — ONDANSETRON 4 MG/1
4 TABLET, ORALLY DISINTEGRATING ORAL
Status: DISCONTINUED | OUTPATIENT
Start: 2023-01-26 | End: 2023-01-26 | Stop reason: HOSPADM

## 2023-01-26 RX ORDER — ACETAMINOPHEN 650 MG/1
650 SUPPOSITORY RECTAL
Status: DISCONTINUED | OUTPATIENT
Start: 2023-01-26 | End: 2023-01-26 | Stop reason: HOSPADM

## 2023-01-26 RX ORDER — ASPIRIN 81 MG/1
81 TABLET ORAL DAILY
Status: DISCONTINUED | OUTPATIENT
Start: 2023-01-26 | End: 2023-01-26 | Stop reason: HOSPADM

## 2023-01-26 RX ORDER — IBUPROFEN 200 MG
4 TABLET ORAL AS NEEDED
Status: DISCONTINUED | OUTPATIENT
Start: 2023-01-26 | End: 2023-01-26 | Stop reason: HOSPADM

## 2023-01-26 RX ORDER — METOPROLOL TARTRATE 25 MG/1
12.5 TABLET, FILM COATED ORAL 2 TIMES DAILY
Status: DISCONTINUED | OUTPATIENT
Start: 2023-01-26 | End: 2023-01-26

## 2023-01-26 RX ORDER — ONDANSETRON 2 MG/ML
4 INJECTION INTRAMUSCULAR; INTRAVENOUS
Status: DISCONTINUED | OUTPATIENT
Start: 2023-01-26 | End: 2023-01-26 | Stop reason: HOSPADM

## 2023-01-26 RX ORDER — METOPROLOL TARTRATE 25 MG/1
25 TABLET, FILM COATED ORAL 2 TIMES DAILY
Status: DISCONTINUED | OUTPATIENT
Start: 2023-01-26 | End: 2023-01-26 | Stop reason: HOSPADM

## 2023-01-26 RX ORDER — ROSUVASTATIN CALCIUM 10 MG/1
20 TABLET, COATED ORAL
Status: DISCONTINUED | OUTPATIENT
Start: 2023-01-26 | End: 2023-01-26

## 2023-01-26 RX ORDER — IPRATROPIUM BROMIDE AND ALBUTEROL SULFATE 2.5; .5 MG/3ML; MG/3ML
3 SOLUTION RESPIRATORY (INHALATION)
Status: DISCONTINUED | OUTPATIENT
Start: 2023-01-26 | End: 2023-01-26 | Stop reason: RX

## 2023-01-26 RX ORDER — DEXTROSE MONOHYDRATE 100 MG/ML
0-250 INJECTION, SOLUTION INTRAVENOUS AS NEEDED
Status: DISCONTINUED | OUTPATIENT
Start: 2023-01-26 | End: 2023-01-26 | Stop reason: HOSPADM

## 2023-01-26 RX ORDER — INSULIN LISPRO 100 [IU]/ML
INJECTION, SOLUTION INTRAVENOUS; SUBCUTANEOUS
Status: DISCONTINUED | OUTPATIENT
Start: 2023-01-26 | End: 2023-01-26 | Stop reason: HOSPADM

## 2023-01-26 RX ORDER — ESCITALOPRAM OXALATE 20 MG/1
20 TABLET ORAL DAILY
COMMUNITY

## 2023-01-26 RX ORDER — POLYETHYLENE GLYCOL 3350 17 G/17G
17 POWDER, FOR SOLUTION ORAL DAILY PRN
Status: DISCONTINUED | OUTPATIENT
Start: 2023-01-26 | End: 2023-01-26 | Stop reason: HOSPADM

## 2023-01-26 RX ORDER — ESCITALOPRAM OXALATE 20 MG/1
20 TABLET ORAL DAILY
Status: DISCONTINUED | OUTPATIENT
Start: 2023-01-26 | End: 2023-01-26 | Stop reason: HOSPADM

## 2023-01-26 RX ORDER — SODIUM CHLORIDE 0.9 % (FLUSH) 0.9 %
5-40 SYRINGE (ML) INJECTION EVERY 8 HOURS
Status: DISCONTINUED | OUTPATIENT
Start: 2023-01-26 | End: 2023-01-26 | Stop reason: HOSPADM

## 2023-01-26 NOTE — ED PROVIDER NOTES
EMERGENCY DEPARTMENT HISTORY AND PHYSICAL EXAM    12:06 AM patient seen at this time in room 3      Date: 1/25/2023  Patient Name: Lakshmi Funes    History of Presenting Illness     No chief complaint on file. History Provided By: Son and wife    Additional History (Context): Lakshmi Funes is a 76 y.o. male presents with early dementia, lives with his wife, comes in for the second time in 24 hours, apparently collapsed onto their couch and though his eyes were open he was not speaking or responding to his wife and scared her. The patient does not remember the event. There is no seizure activity reported no loss of bowel or bladder continence. Blood sugar was normal by EMS    Review of studies done today CT of the head chemistries complete blood count urinalysis were all nonactionable. PCP: Romana Valerio MD    Chief Complaint:   Duration:    Timing:    Location:   Quality:   Severity:   Modifying Factors:   Associated Symptoms:       Current Outpatient Medications   Medication Sig Dispense Refill    Zinc Mth/Copper/Saw Palm/Gnsg (PROSTATE HEALTH FORMULA PO) Take  by mouth.      empagliflozin (Jardiance) 25 mg tablet Take 25 mg by mouth daily. metFORMIN (GLUCOPHAGE) 500 mg tablet Take 500 mg by mouth daily (with breakfast). glimepiride (AMARYL) 4 mg tablet Take 4 mg by mouth every morning. metoprolol tartrate (LOPRESSOR) 25 mg tablet Take 0.5 Tabs by mouth two (2) times a day. 30 Tab 0    glucose blood VI test strips (Accu-Chek Tracee Plus test strp) strip       Accu-Chek Tracee Plus test strp strip       clopidogreL (PLAVIX) 75 mg tab Take 75 mg by mouth daily. donepeziL (ARICEPT) 10 mg tablet Take 10 mg by mouth nightly. lancets (Accu-Chek Softclix Lancets) misc       aspirin delayed-release 81 mg tablet Take 81 mg by mouth daily. ezetimibe-simvastatin (VYTORIN 10/40) 10-40 mg per tablet Take 1 Tab by mouth nightly.       FIBER CHOICE PO Take 1 Tab by mouth daily. MULTIVITAMIN W/IRON, MINERALS (MULTIVITAMIN AND MINERALS PO) Take 1 Tab by mouth daily. OTHER,NON-FORMULARY, Indications: Move Free joint care         Past History     Past Medical History:  Past Medical History:   Diagnosis Date    CAD (coronary artery disease)     Aortic Stenosis    Carotid artery stenosis     Dementia (HCC)     Diabetes (HCC)     Glaucoma     High cholesterol     Hypertension     Ill-defined condition     Bilateral Carotid Artery Stenosis       Past Surgical History:  Past Surgical History:   Procedure Laterality Date    MI UNLISTED PROCEDURE CARDIAC SURGERY      bypass       Family History:  Family History   Problem Relation Age of Onset    Diabetes Other        Social History:  Social History     Tobacco Use    Smoking status: Never    Smokeless tobacco: Never   Substance Use Topics    Alcohol use: No    Drug use: No       Allergies:  No Known Allergies      Review of Systems     Review of Systems      Physical Exam       Patient Vitals for the past 12 hrs:   Temp Pulse Resp BP SpO2   01/25/23 2348 98 °F (36.7 °C) -- -- -- --   01/25/23 2345 -- -- 16 (!) 147/77 93 %   01/25/23 2333 -- 60 14 (!) 171/71 93 %   01/25/23 2318 -- 64 14 (!) 144/82 98 %       IPVITALS  Patient Vitals for the past 24 hrs:   BP Temp Pulse Resp SpO2 Height Weight   01/25/23 2348 -- 98 °F (36.7 °C) -- -- -- -- --   01/25/23 2345 (!) 147/77 -- -- 16 93 % 5' 4\" (1.626 m) 79.4 kg (175 lb)   01/25/23 2333 (!) 171/71 -- 60 14 93 % -- --   01/25/23 2318 (!) 144/82 -- 64 14 98 % 5' 4\" (1.626 m) 79.4 kg (175 lb)       Physical Exam  Vitals and nursing note reviewed. Constitutional:       General: He is not in acute distress. Appearance: He is well-developed. He is not ill-appearing. HENT:      Head: Normocephalic and atraumatic. Eyes:      General: No scleral icterus. Extraocular Movements: Extraocular movements intact.       Conjunctiva/sclera: Conjunctivae normal.      Pupils: Pupils are equal, round, and reactive to light. Neck:      Vascular: No JVD. Cardiovascular:      Rate and Rhythm: Normal rate and regular rhythm. Heart sounds: Normal heart sounds. Comments: 4 intact extremity pulses  Pulmonary:      Effort: Pulmonary effort is normal.      Breath sounds: Normal breath sounds. Abdominal:      Palpations: Abdomen is soft. There is no mass. Tenderness: There is no abdominal tenderness. Musculoskeletal:         General: Normal range of motion. Cervical back: Normal range of motion and neck supple. Lymphadenopathy:      Cervical: No cervical adenopathy. Skin:     General: Skin is warm and dry. Neurological:      General: No focal deficit present. Mental Status: He is alert. Cranial Nerves: No cranial nerve deficit. Comments: Alert normal speech and sentence formation. However he has no recollection of the events. Diagnostic Study Results   Labs -  Recent Results (from the past 24 hour(s))   CBC WITH AUTOMATED DIFF    Collection Time: 01/25/23  6:11 AM   Result Value Ref Range    WBC 5.1 4.6 - 13.2 K/uL    RBC 3.97 (L) 4.35 - 5.65 M/uL    HGB 11.7 (L) 13.0 - 16.0 g/dL    HCT 35.3 (L) 36.0 - 48.0 %    MCV 88.9 78.0 - 100.0 FL    MCH 29.5 24.0 - 34.0 PG    MCHC 33.1 31.0 - 37.0 g/dL    RDW 13.2 11.6 - 14.5 %    PLATELET 661 193 - 289 K/uL    MPV 9.8 9.2 - 11.8 FL    NRBC 0.0 0  WBC    ABSOLUTE NRBC 0.00 0.00 - 0.01 K/uL    NEUTROPHILS 65 40 - 73 %    LYMPHOCYTES 20 (L) 21 - 52 %    MONOCYTES 10 3 - 10 %    EOSINOPHILS 4 0 - 5 %    BASOPHILS 1 0 - 2 %    IMMATURE GRANULOCYTES 0 0.0 - 0.5 %    ABS. NEUTROPHILS 3.3 1.8 - 8.0 K/UL    ABS. LYMPHOCYTES 1.0 0.9 - 3.6 K/UL    ABS. MONOCYTES 0.5 0.05 - 1.2 K/UL    ABS. EOSINOPHILS 0.2 0.0 - 0.4 K/UL    ABS. BASOPHILS 0.0 0.0 - 0.1 K/UL    ABS. IMM.  GRANS. 0.0 0.00 - 0.04 K/UL    DF AUTOMATED     METABOLIC PANEL, COMPREHENSIVE    Collection Time: 01/25/23  6:11 AM   Result Value Ref Range Sodium 143 136 - 145 mmol/L    Potassium 3.7 3.5 - 5.5 mmol/L    Chloride 110 100 - 111 mmol/L    CO2 27 21 - 32 mmol/L    Anion gap 6 3.0 - 18 mmol/L    Glucose 226 (H) 74 - 99 mg/dL    BUN 18 7.0 - 18 MG/DL    Creatinine 1.34 (H) 0.6 - 1.3 MG/DL    BUN/Creatinine ratio 13 12 - 20      eGFR 54 (L) >60 ml/min/1.73m2    Calcium 8.6 8.5 - 10.1 MG/DL    Bilirubin, total 0.6 0.2 - 1.0 MG/DL    ALT (SGPT) 29 16 - 61 U/L    AST (SGOT) 35 10 - 38 U/L    Alk. phosphatase 90 45 - 117 U/L    Protein, total 6.1 (L) 6.4 - 8.2 g/dL    Albumin 3.2 (L) 3.4 - 5.0 g/dL    Globulin 2.9 2.0 - 4.0 g/dL    A-G Ratio 1.1 0.8 - 1.7     NT-PRO BNP    Collection Time: 01/25/23  6:11 AM   Result Value Ref Range    NT pro- 0 - 1,800 PG/ML   TROPONIN-HIGH SENSITIVITY    Collection Time: 01/25/23  6:11 AM   Result Value Ref Range    Troponin-High Sensitivity 12 0 - 78 ng/L   EKG, 12 LEAD, INITIAL    Collection Time: 01/25/23  6:43 AM   Result Value Ref Range    Ventricular Rate 57 BPM    Atrial Rate 57 BPM    QRS Duration 90 ms    Q-T Interval 498 ms    QTC Calculation (Bezet) 484 ms    Calculated T Axis 5 degrees    Diagnosis       Junctional rhythm  Moderate voltage criteria for LVH, may be normal variant  Prolonged QT  Abnormal ECG  When compared with ECG of 31-AUG-2022 16:10,  Junctional rhythm has replaced Sinus rhythm     URINALYSIS W/ RFLX MICROSCOPIC    Collection Time: 01/25/23 10:24 AM   Result Value Ref Range    Color YELLOW      Appearance CLEAR      Specific gravity 1.028 1.005 - 1.030      pH (UA) 5.5 5.0 - 8.0      Protein Negative NEG mg/dL    Glucose >1,000 (A) NEG mg/dL    Ketone Negative NEG mg/dL    Bilirubin Negative NEG      Blood Negative NEG      Urobilinogen 0.2 0.2 - 1.0 EU/dL    Nitrites Negative NEG      Leukocyte Esterase Negative NEG         Radiologic Studies -   No orders to display     CT HEAD WO CONT    Result Date: 1/25/2023  CT OF THE HEAD WITHOUT CONTRAST. CLINICAL HISTORY: Altered mental status. TECHNIQUE: Helical scan obtained of the head were obtained from the skull vertex through the base of the skull without intravenous contrast.    All CT scans are performed using dose optimization techniques as appropriate to the performed exam including the following: Automated exposure control, adjustment of mA and/or kV according to patient size, and use of iterative reconstructive technique. COMPARISON: 1/16/2023. FINDINGS: Stable sulcal pattern with chronic atrophy and ex vacuo compensatory enlargement of the lateral and third ventricles. Minimal periventricular white matter hypodensities. No new findings. No intracranial hemorrhage. No mass effect. The visualized paranasal sinuses are clear. The mastoid air cells are clear. The visualized bony structures are unremarkable. No acute intracranial findings. Stable chronic senescent changes. XR CHEST PORT    Result Date: 1/25/2023  Examination: Portable AP chest History: Altered mental status Comparison: January 16, 2023 Findings: Lungs are grossly clear. There are extensive asbestosis related pleural calcifications. The heart is mildly enlarged in size. There is been prior CABG. 1.  Asbestosis related pleural disease. No definite acute pulmonary process. Medications ordered:   Medications - No data to display      Medical Decision Making   Initial Medical Decision Making and DDx:   Physical exam review of prior labs do not suggest hypoglycemia, alarming metabolic derangement, kidney failure, anemia, ACS, pneumonia, intracranial injury. Not suggestive of stroke or seizure. Will get ABG, elevated CO2 could cause some confusion. We will plan on admitting, sounds very much like a syncopal episode. ED Course: Progress Notes, Reevaluation, and Consults:         I am the first provider for this patient. I reviewed the vital signs, available nursing notes, past medical history, past surgical history, family history and social history.     Patient Vitals for the past 12 hrs:   Temp Pulse Resp BP SpO2   01/25/23 2348 98 °F (36.7 °C) -- -- -- --   01/25/23 2345 -- -- 16 (!) 147/77 93 %   01/25/23 2333 -- 60 14 (!) 171/71 93 %   01/25/23 2318 -- 64 14 (!) 144/82 98 %       Vital Signs-Reviewed the patient's vital signs. Pulse Oximetry Analysis, Cardiac Monitor, 12 lead ekg:      Interpreted by the EP. Records Reviewed: Nursing notes reviewed (Time of Review: 12:06 AM)    Procedures:   Critical Care Time: 0  If critical care time is note it is exclusive of any separately billable procedures. Aspirin: (was aspirin given for stroke?)    Diagnosis     Clinical Impression: No diagnosis found. Disposition:       Follow-up Information    None          Patient's Medications   Start Taking    No medications on file   Continue Taking    ACCU-CHEK ROBERT PLUS TEST STRP STRIP        ASPIRIN DELAYED-RELEASE 81 MG TABLET    Take 81 mg by mouth daily. CLOPIDOGREL (PLAVIX) 75 MG TAB    Take 75 mg by mouth daily. DONEPEZIL (ARICEPT) 10 MG TABLET    Take 10 mg by mouth nightly. EMPAGLIFLOZIN (JARDIANCE) 25 MG TABLET    Take 25 mg by mouth daily. EZETIMIBE-SIMVASTATIN (VYTORIN 10/40) 10-40 MG PER TABLET    Take 1 Tab by mouth nightly. FIBER CHOICE PO    Take 1 Tab by mouth daily. GLIMEPIRIDE (AMARYL) 4 MG TABLET    Take 4 mg by mouth every morning. GLUCOSE BLOOD VI TEST STRIPS (ACCU-CHEK ROBERT PLUS TEST STRP) STRIP        LANCETS (ACCU-CHEK SOFTCLIX LANCETS) MISC        METFORMIN (GLUCOPHAGE) 500 MG TABLET    Take 500 mg by mouth daily (with breakfast). METOPROLOL TARTRATE (LOPRESSOR) 25 MG TABLET    Take 0.5 Tabs by mouth two (2) times a day. MULTIVITAMIN W/IRON, MINERALS (MULTIVITAMIN AND MINERALS PO)    Take 1 Tab by mouth daily. OTHER,NON-FORMULARY,    Indications: Move Free joint care    ZINC MTH/COPPER/SAW PALM/GNSG (PROSTATE HEALTH FORMULA PO)    Take  by mouth.    These Medications have changed    No medications on file   Stop Taking    No medications on file     _______________________________    Notes:    Vonzella Habermann, MD using Dragon dictation      _______________________________

## 2023-01-26 NOTE — ED NOTES
Pt ambulated to restroom w/ steady gait. RN walked w/ pt d/t unknown nature of pt's fall earlier this evening.

## 2023-01-26 NOTE — DISCHARGE SUMMARY
Discharge Summary    Date of Service: 1/26/2023  Admission date:  1/26/2023  Discharge date:  1/26/2023  Attending:  Roger Avery D.O.  PCP:  Christine Mcdonald M.D. Admission diagnoses:  Concern for Delirium  QT Prolongation  Dementia  Depression  Hypertension  S/P CABG  S/P TAVR  PVD  BPH  Diabetes Mellitus Type 2    Discharge diagnoses:  Concern for Delirium  QT Prolongation  Dementia  Depression  Hypertension  S/P CABG  S/P TAVR  PVD  BPH  Diabetes Mellitus Type 2    Consults:  None  Procedures:  None  Diagnostic Imaging:  PORTABLE AP CHEST  History: Altered mental status  Comparison: January 16, 2023  Findings: Lungs are grossly clear. There are extensive asbestosis related pleural calcifications. The heart is mildly enlarged in size. There is been prior CABG. IMPRESSION  1. Asbestosis related pleural disease. No definite acute pulmonary process. CT OF THE HEAD WITHOUT CONTRAST  CLINICAL HISTORY: Altered mental status. TECHNIQUE: Helical scan obtained of the head were obtained from the skull vertex through the base of the skull without intravenous contrast.    All CT scans are performed using dose optimization techniques as appropriate to the performed exam including the following: Automated exposure control, adjustment of mA and/or kV according to patient size, and use of iterative reconstructive technique. COMPARISON: 1/16/2023. FINDINGS:   Stable sulcal pattern with chronic atrophy and ex vacuo compensatory enlargement of the lateral and third ventricles. Minimal periventricular white matter hypodensities. No new findings. No intracranial hemorrhage. No mass effect. The visualized paranasal sinuses are clear. The mastoid air cells are clear. The visualized bony structures are unremarkable. IMPRESSION  No acute intracranial findings. Stable chronic senescent changes. HPI (per admitting Physician, Roger Harrison D.O.):  Tona Stephens is a 76 y.o. male who presents to [facility] ER with complaint of Altered Mental Status and Difficulty Speaking. As Noted by [facility] ER Physician, Patient arrives for the second time in 24 hours for work-up regarding Altered Mental Status. Patient was initially worked up, no focal cause was found, and labs were reassuring, so Patient was discharged home. However, Patient's Son reports that Patient had some difficulty speaking at home and he was brought back for reassessment. Notably, Patient and Patient's Wife are poor historians with some Hardness of Hearing and history is difficult to obtain even between the two of them. Patient's Son attempts to help with providing the history. History of present illness is very vague, as neither Patient nor his Wife can answer specific questions with any reliability, particularly as pertains to chronicity of events. Patient unsure when he last had diarrhea, but states \"it wasn't that long ago\" when he was asked if this occurred months ago. Patient reports that he had dysuria, but cannot recall when (possibly weeks ago). There were various reports previously that Patient may have fallen, may have felt short of breath, and may have had an episode where he was unresponsive---but none of these have any reliable witness. Patient's Son reports that Patient was having some difficulty speaking and suggests that seems more likely confusion. Patient's Son reports that Patient has Dementia, but has not seen a Neurologist for this yet. Patient's Son states that Patient \"is good for 5 hours tops\" before he will become paranoid and attempt to leave the hospital and has reportedly pulled out IV lines and attempted to elope previously (this sounds like \"Sundowning\"). ROS is otherwise unclear.      In [facility] ER, Patient is noted to have Temperature 98.0°F, Heart Rate 54 bpm to 64 bpm, Respiration Rate 19 bpm, Blood Pressure 129/78 mm Hg to 171/71 mm Hg, SpO2 87% on Room Air, WBC 5.1, Hgb 11.7 g/dL, Neut% 65%, Neut# 3.3, UA (-), Glucose 226 mg/dL, BUN 18, Creatinine 1.34 mg/dL (at Baseline), eGFR 54, Albumin 3.2 g/dL, Troponin 12 ng/L, Pro- pg/mL, and pH 7.41, pCO2 39.9, pO2 72, HCO3 25.1, sO2 94.3. CXR has been read by Radiologist to show \"Asbestosis related pleural disease. No definite acute pulmonary process. \"  CT Head w/o Contrast has been read by Radiologist to show \"No acute intracranial findings. Stable chronic senescent changes. \"  Patient received no agents in [facility] ER. Patient is admitted to [facility] Telemetry Unit for management of concern for possible Delirium. Hospital Course:  Patient's Son states that he would like to take Patient and Patient's Wife home, as they are \"getting that way,\" alluding to Patient and Patient's Wife succumbing to sundowning. Admission of Patient was marginal and contingent on attempting to discover the cause of possible Delirium after a reassuring initial work-up and then return within 24 hours. Discharge after second round of lab tests is not unreasonable, so long as Patient's Son is okay with no cause of possible Delirium being found (and he is). Lactic Acid (-), Serum Ammonia 30 umol/L, Respiratory Viral Panel (-), and TSH 3.20 uIU/L. Work-up thus far negative.     Follow-Up labs:  None  Discharge Medications:  Aspirin Delayed-Release 81 mg PO qday  Empagliflozin (Jardiance) 25 mg PO qday  Escitalopram oxalate 20 mg PO qday  Fiber Choice PO 1 tab PO qday  Glimepiride (Amaryl) 4 mg PO qday  Magnesium Glycinate 1 tab PO qday  Memantine (Namenda) 5 mg tab PO qday  Metformin (Glucophage) 500 mg tab 2 tabs PO qBreakfast  Metformin (Glucophage) 500 mg tab 1 tab PO qDinner  Metoprolol tartrate 25 mg 1 tab PO BID  Multivitamin 1 tab PO qday  Rosuvastatin 20 mg PO qHS (nightly)  Saw Palmetto supplement 1 tab PO 1day  Clopidogrel was DISCONTINUED per Primary Cardiologist's Office Note on 11/18/2022    Condition:  Stable, Questionable at Baseline Mentation  Disposition:  Home (under Son's Care)  Activity:  Up as Tolerated  Diet:  Diabetic   Discharge Instructions: Follow-up with PCP in 1 week. Follow-up with Neurologist in 2 weeks. Discharge Recommendations: Follow-up with PCP in 1 week. Follow-up with Neurologist in 2 weeks. Carbon copy to Patient's PCP Judy Cano M.D.). Discharge Planning took 20 minutes.

## 2023-01-26 NOTE — ED TRIAGE NOTES
Pt presents to ED via EMS c/o altered mental status/fall. Pt's wife poor historian. Pt's son states, Aurelia Nicholas was unable to form sentences, was more confused, and seemed out of it\". Wife believes pt fell but cannot tell RN when or what happened that caused her to call her son or 44.

## 2023-01-26 NOTE — H&P
History and Physical    Patient: Gerard Berry MRN: [id number]  SSN: [id number]    YOB: 1945  Age: 76 y.o. Sex: male      Subjective:      Gerard Berry is a 76 y.o. male who presents to [facility] ER with complaint of Altered Mental Status and Difficulty Speaking. As Noted by [facility] ER Physician, Patient arrives for the second time in 24 hours for work-up regarding Altered Mental Status. Patient was initially worked up, no focal cause was found, and labs were reassuring, so Patient was discharged home. However, Patient's Son reports that Patient had some difficulty speaking at home and he was brought back for reassessment. Notably, Patient and Patient's Wife are poor historians with some Hardness of Hearing and history is difficult to obtain even between the two of them. Patient's Son attempts to help with providing the history. History of present illness is very vague, as neither Patient nor his Wife can answer specific questions with any reliability, particularly as pertains to chronicity of events. Patient unsure when he last had diarrhea, but states \"it wasn't that long ago\" when he was asked if this occurred months ago. Patient reports that he had dysuria, but cannot recall when (possibly weeks ago). There were various reports previously that Patient may have fallen, may have felt short of breath, and may have had an episode where he was unresponsive---but none of these have any reliable witness. Patient's Son reports that Patient was having some difficulty speaking and suggests that seems more likely confusion. Patient's Son reports that Patient has Dementia, but has not seen a Neurologist for this yet. Patient's Son states that Patient \"is good for 5 hours tops\" before he will become paranoid and attempt to leave the hospital and has reportedly pulled out IV lines and attempted to elope previously (this sounds like \"Sundowning\").   ROS is otherwise unclear. In [facility] ER, Patient is noted to have Temperature 98.0°F, Heart Rate 54 bpm to 64 bpm, Respiration Rate 19 bpm, Blood Pressure 129/78 mm Hg to 171/71 mm Hg, SpO2 87% on Room Air, WBC 5.1, Hgb 11.7 g/dL, Neut% 65%, Neut# 3.3, UA (-), Glucose 226 mg/dL, BUN 18, Creatinine 1.34 mg/dL (at Baseline), eGFR 54, Albumin 3.2 g/dL, Troponin 12 ng/L, Pro- pg/mL, and pH 7.41, pCO2 39.9, pO2 72, HCO3 25.1, sO2 94.3. CXR has been read by Radiologist to show \"Asbestosis related pleural disease. No definite acute pulmonary process. \"  CT Head w/o Contrast has been read by Radiologist to show \"No acute intracranial findings. Stable chronic senescent changes. \"  Patient received no agents in [facility] ER. Patient is admitted to [facility] Telemetry Unit for management of concern for possible Delirium. Past Medical History:   Diagnosis Date    Carotid artery stenosis     Bilateral Carotid Artery Stenosis    Dementia (HCC)     Diabetes (HCC)     Glaucoma     High cholesterol     Hypertension     S/P CABG (coronary artery bypass graft)     CAD S/P CABG    S/P TAVR (transcatheter aortic valve replacement)      Past Surgical History:   Procedure Laterality Date    [place] UNLISTED PROCEDURE CARDIAC SURGERY      bypass      Family History   Problem Relation Age of Onset    Diabetes Other      Social History     Tobacco Use    Smoking status: Never    Smokeless tobacco: Never   Substance Use Topics    Alcohol use: No      Prior to Admission medications    Medication Sig Start Date End Date Taking? Authorizing Provider   MAGNESIUM GLYCINATE, BULK, Take 1 Tablet by mouth daily. Yes Provider, Historical   escitalopram oxalate (LEXAPRO) 20 mg tablet Take 20 mg by mouth daily. Yes Provider, Historical   rosuvastatin (CRESTOR) 20 mg tablet Take 20 mg by mouth nightly. Yes Provider, Historical   memantine (NAMENDA) 5 mg tablet Take 5 mg by mouth daily.    Yes Provider, Historical   Zinc Mth/Copper/Saw Palm/Gnsg ([address] PO) Take  by mouth. Yes Joe, MD Lisa   empagliflozin (JARDIANCE) 25 mg tablet Take 25 mg by mouth daily. 9/13/21  Yes Lisa Diaz MD   metFORMIN (GLUCOPHAGE) 500 mg tablet Take 500 mg by mouth daily (with breakfast). Yes Provider, Historical   glimepiride (AMARYL) 4 mg tablet Take 4 mg by mouth every morning. Yes Provider, Historical   metoprolol tartrate (LOPRESSOR) 25 mg tablet Take 0.5 Tabs by mouth two (2) times a day. Patient taking differently: Take 25 mg by mouth two (2) times a day. 10/13/20  Yes Goldy Giron MD   clopidogreL (PLAVIX) 75 mg tab Take 75 mg by mouth daily. 5/8/20  Yes Lisa Diaz MD   aspirin delayed-release 81 mg tablet Take 81 mg by mouth daily. Yes Joe, MD Lisa   MULTIVITAMIN W/IRON, MINERALS (MULTIVITAMIN AND MINERALS PO) Take 1 Tab by mouth daily. Yes Joe, MD Lisa   glucose blood VI test strips strip  4/8/20   Joe, MD Lisa   Accu-Chek Tracee Plus test strp strip  4/8/20   OtherLisa MD   lancets misc  6/18/19   Joe, MD Lisa   FIBER CHOICE PO Take 1 Tab by mouth daily. Lisa Diaz MD   Note from Office Visit to Cardiologist shows that Patient was asked to DISCONTINUE Clopidogrel on 11/18/2022 (if not earlier). No Known Allergies    Review of Systems:  Patient is A&O x2/2 during interview and cannot provide a reliable ROS except for observations made at that time of questioning:  (-) Chest Pain  (-) Pain with Inspiration  (-) Abdominal Pain  (-) Nausea  (-) Vomiting  (-) Shortness of Breath    Objective:     Vitals:    01/26/23 0048 01/26/23 0103 01/26/23 0133 01/26/23 0148   BP: (!) 159/85 (!) 164/70 (!) 160/73 (!) 165/81   Pulse: (!) 56 (!) 54 60 60   Resp: 15 19 14 13   Temp:       SpO2: 96% (!) 87% 100% 99%   Weight:       Height:            Physical Exam:  General:  Older adult male lying in bed in no acute distress  HEENT:  Atraumatic, normocephalic; Pupils equally round and reactive to light with accommodation; Extraocular muscles intact;  Moist Oropharynx without erythema, edema, or exudates  Neck:  No Bruits; No Lymphadenopathy  Chest:  No pectus carinatum; No pectus excavatum  Cardiovascular:  (+) Borderline Bradycardic rate, regular rhythm without rubs, gallops, or murmurs  Respiratory:  Clear to Auscultation Bilaterally without wheezes, rales, or rhonchi; normal effort of breathing  Abdominal:  Soft, non-tense, non-tender abdomen; BS present without guarding, rebound, or masses  :  Deferred  Extremities:  Pulses 2+ x4 without edema, clubbing, or cyanosis  Musculoskeletal:  Strength 5/5 and symmetrical in BUE and BLE  Integument:  No rash on face, forearms, or legs  Neurological: (+) A&O x2/4 (states YEAR is \"2020\" and cannot guess STATE); No gross deficits of Visual Acuity, Eye Movement, Jaw Opening, Facial Expression, Hearing, Phonation, or Head Movement;  No gross deficits of Tongue Movement or Slurring of Speech  Psychiatric:  Affect is appropriate; Language is present and fluent; (+) Behavior exhibits deflections and concealment of disorientation, but is otherwise appropriate      Laboratory Studies:  CMP:   Lab Results   Component Value Date/Time     01/25/2023 11:32 PM    K 3.4 (L) 01/25/2023 11:32 PM     01/25/2023 11:32 PM    CO2 28 01/25/2023 11:32 PM    AGAP 4 01/25/2023 11:32 PM     (H) 01/25/2023 11:32 PM    BUN 18 01/25/2023 11:32 PM    CREA 1.28 01/25/2023 11:32 PM    CA 8.8 01/25/2023 11:32 PM    ALB 3.4 01/25/2023 11:32 PM    TP 6.3 (L) 01/25/2023 11:32 PM    GLOB 2.9 01/25/2023 11:32 PM    AGRAT 1.2 01/25/2023 11:32 PM    ALT 28 01/25/2023 11:32 PM     CBC:   Lab Results   Component Value Date/Time    WBC 5.2 01/25/2023 11:32 PM    HGB 11.9 (L) 01/25/2023 11:32 PM    HCT 35.7 (L) 01/25/2023 11:32 PM     01/25/2023 11:32 PM     All Cardiac Markers in the last 24 hours: No results found for: CPK, CK, CKMMB, CKMB, RCK3, CKMBT, CKNDX, CKND1, OCTAVIO, TROPT, TROIQ, SERENA, TROPT, TNIPOC, BNP, BNPP  Recent Glucose Results:   Lab Results   Component Value Date/Time     (H) 01/25/2023 11:32 PM     ABG:   Lab Results   Component Value Date/Time    PHI 7.41 01/26/2023 12:29 AM    PCO2I 39.9 01/26/2023 12:29 AM    PO2I 72 (L) 01/26/2023 12:29 AM    HCO3I 25.1 01/26/2023 12:29 AM    FIO2I 21 01/26/2023 12:29 AM     COAGS: No results found for: APTT, PTP, INR, INREXT, INREXT     Images Reviewed:  CT HEAD WO CONT    Result Date: 1/25/2023  CT OF THE HEAD WITHOUT CONTRAST. CLINICAL HISTORY: Altered mental status. TECHNIQUE: Helical scan obtained of the head were obtained from the skull vertex through the base of the skull without intravenous contrast.    All CT scans are performed using dose optimization techniques as appropriate to the performed exam including the following: Automated exposure control, adjustment of mA and/or kV according to patient size, and use of iterative reconstructive technique. COMPARISON: 1/16/2023. FINDINGS: Stable sulcal pattern with chronic atrophy and ex vacuo compensatory enlargement of the lateral and third ventricles. Minimal periventricular white matter hypodensities. No new findings. No intracranial hemorrhage. No mass effect. The visualized paranasal sinuses are clear. The mastoid air cells are clear. The visualized bony structures are unremarkable. No acute intracranial findings. Stable chronic senescent changes. XR CHEST PORT    Result Date: 1/25/2023  Examination: Portable AP chest History: Altered mental status Comparison: January 16, 2023 Findings: Lungs are grossly clear. There are extensive asbestosis related pleural calcifications. The heart is mildly enlarged in size. There is been prior CABG. 1.  Asbestosis related pleural disease. No definite acute pulmonary process.       Assessment:     Hospital Problems  Date Reviewed: 1/26/2023            Codes Class Noted POA    QT prolongation ICD-10-CM: R94.31  ICD-9-CM: 794.31  1/26/2023 Yes    Overview Signed 1/26/2023  1:22 AM by Moncho Javier, DO     QTc Prolongation (484 ms) by EKG on 1/25/2023. S/P CABG (coronary artery bypass graft) (Chronic) ICD-10-CM: Z95.1  ICD-9-CM: V45.81  1/26/2023 Yes        S/P TAVR (transcatheter aortic valve replacement) (Chronic) ICD-10-CM: Z95.2  ICD-9-CM: V43.3  1/26/2023 Yes        PVD (peripheral vascular disease) (HCC) (Chronic) ICD-10-CM: I73.9  ICD-9-CM: 443.9  1/26/2023 Yes        Hypertension (Chronic) ICD-10-CM: I10  ICD-9-CM: 401.9  1/26/2023 Yes        Hyperlipidemia (Chronic) ICD-10-CM: E78.5  ICD-9-CM: 272.4  1/26/2023 Yes        Dementia ([address] (Chronic) ICD-10-CM: F03.90  ICD-9-CM: 294.20  1/26/2023 Yes        Depression (Chronic) ICD-10-CM: F32. A  ICD-9-CM: 013  1/26/2023 Yes        BPH (benign prostatic hyperplasia) (Chronic) ICD-10-CM: N40.0  ICD-9-CM: 600.00  1/26/2023 Yes        Type 2 diabetes mellitus, without long-term current use of insulin (HCC) (Chronic) ICD-10-CM: E11.9  ICD-9-CM: 250.00  1/26/2023 Yes           Plan:     concern for possible Delirium  Check Serum Ammonia, Troponin, Lactic Acid, TSH, Respiratory Viral Panel, and Blood Cultures. Consider consulting Neurological services versus referring Patient to Outpatient Neurological services. Monitor Patient for delirium, causes of confusion, and possible arrhythmia. Anticipate discharge and change of admission to Observation if no obvious source is found with outpatient follow-up with Neurological services for serial evaluations of mental state to make diagnosis/prognosis of dementia. QT Prolongation  - QTc Prolongation (484 ms) by EKG performed on 1/25/2023. Avoid QTc Prolonging agents. CAD S/P CABG, PVD  Continue home Aspirin (HOLD Clopidogrel, as this should have been DISCONTINUED per Cardiologist's Office Note on 11/18/2022) and Metoprolol tartrate. Hypertension  No current home medications for this issue. Hyperlipidemia  Continue home Rosuvastatin.     Dementia  Continue home Memantine. Depression  Continue home Escitalopram.    BPH  HOLD home Palmetto supplement. Asbestosis? PRN Duoneb. Diabetes Mellitus Type 2 with Glaucoma  POC Glucose checks qACHS with SSI coverage. DVT Prophylaxis  DVT chemoprophylaxis is achieved with subcutaneous Enoxaparin 40 mg qday.      Signed By: Vandana Panchal,      January 26, 2023

## 2023-01-26 NOTE — PROGRESS NOTES
0500: Dr. Aliya Li paged about patient's son expressing that the family wants to leave. 0530: Dr. Aliya Li is in the room with the patient's family and will start the discharge paperwork. 3198: Patient discharged and education given to son.

## 2023-01-26 NOTE — PROGRESS NOTES
INTERIM UPDATE - 0515 EST on 1/26/2023    Patient's Son states that he would like to take Patient and Patient's Wife home, as they are \"getting that way,\" alluding to Patient and Patient's Wife succumbing to sundowning. Admission of Patient was marginal and contingent on attempting to discover the cause of possible Delirium after a reassuring initial work-up and then return within 24 hours. Discharge after second round of lab tests is not unreasonable, so long as Patient's Son is okay with no cause of possible Delirium being found (and he is). Lactic Acid (-), Serum Ammonia 30 umol/L, Respiratory Viral Panel (-), and TSH 3.20 uIU/L. Work-up thus far negative. Plan: Will discharge Patient at this time.

## 2023-01-26 NOTE — ED NOTES
RN collected a lavendar top tube and a dark green/gray top tube on ice for requested lab tests. A covid/flu swab was also obtained. All samples hand walked to the lab.

## 2023-01-30 ENCOUNTER — APPOINTMENT (OUTPATIENT)
Dept: GENERAL RADIOLOGY | Age: 78
End: 2023-01-30
Attending: EMERGENCY MEDICINE
Payer: MEDICARE

## 2023-01-30 ENCOUNTER — APPOINTMENT (OUTPATIENT)
Dept: CT IMAGING | Age: 78
End: 2023-01-30
Attending: EMERGENCY MEDICINE
Payer: MEDICARE

## 2023-01-30 ENCOUNTER — HOSPITAL ENCOUNTER (EMERGENCY)
Age: 78
Discharge: HOME | End: 2023-01-30
Attending: EMERGENCY MEDICINE
Payer: MEDICARE

## 2023-01-30 VITALS
WEIGHT: 166.7 LBS | SYSTOLIC BLOOD PRESSURE: 154 MMHG | TEMPERATURE: 97.3 F | OXYGEN SATURATION: 96 % | DIASTOLIC BLOOD PRESSURE: 71 MMHG | RESPIRATION RATE: 16 BRPM | BODY MASS INDEX: 25.26 KG/M2 | HEIGHT: 68 IN | HEART RATE: 60 BPM

## 2023-01-30 DIAGNOSIS — R40.4 TRANSIENT ALTERATION OF AWARENESS: Primary | ICD-10-CM

## 2023-01-30 DIAGNOSIS — E86.0 DEHYDRATION: ICD-10-CM

## 2023-01-30 DIAGNOSIS — F03.B4 MODERATE DEMENTIA WITH ANXIETY, UNSPECIFIED DEMENTIA TYPE: ICD-10-CM

## 2023-01-30 LAB
ALBUMIN SERPL-MCNC: 3.5 G/DL (ref 3.4–5)
ALBUMIN/GLOB SERPL: 1.2 (ref 0.8–1.7)
ALP SERPL-CCNC: 80 U/L (ref 45–117)
ALT SERPL-CCNC: 34 U/L (ref 16–61)
ANION GAP SERPL CALC-SCNC: 5 MMOL/L (ref 3–18)
APPEARANCE UR: CLEAR
AST SERPL-CCNC: 29 U/L (ref 10–38)
BASOPHILS # BLD: 0 K/UL (ref 0–0.1)
BASOPHILS NFR BLD: 0 % (ref 0–2)
BILIRUB SERPL-MCNC: 0.4 MG/DL (ref 0.2–1)
BILIRUB UR QL: NEGATIVE
BNP SERPL-MCNC: 116 PG/ML (ref 0–1800)
BUN SERPL-MCNC: 18 MG/DL (ref 7–18)
BUN/CREAT SERPL: 13 (ref 12–20)
CALCIUM SERPL-MCNC: 8.9 MG/DL (ref 8.5–10.1)
CHLORIDE SERPL-SCNC: 107 MMOL/L (ref 100–111)
CO2 SERPL-SCNC: 27 MMOL/L (ref 21–32)
COLOR UR: ABNORMAL
CREAT SERPL-MCNC: 1.44 MG/DL (ref 0.6–1.3)
DIFFERENTIAL METHOD BLD: ABNORMAL
EOSINOPHIL # BLD: 0.2 K/UL (ref 0–0.4)
EOSINOPHIL NFR BLD: 2 % (ref 0–5)
ERYTHROCYTE [DISTWIDTH] IN BLOOD BY AUTOMATED COUNT: 13.4 % (ref 11.6–14.5)
GLOBULIN SER CALC-MCNC: 3 G/DL (ref 2–4)
GLUCOSE SERPL-MCNC: 175 MG/DL (ref 74–99)
GLUCOSE UR STRIP.AUTO-MCNC: >1000 MG/DL
HCT VFR BLD AUTO: 35.7 % (ref 36–48)
HGB BLD-MCNC: 11.9 G/DL (ref 13–16)
HGB UR QL STRIP: NEGATIVE
IMM GRANULOCYTES # BLD AUTO: 0 K/UL (ref 0–0.04)
IMM GRANULOCYTES NFR BLD AUTO: 0 % (ref 0–0.5)
KETONES UR QL STRIP.AUTO: NEGATIVE MG/DL
LACTATE BLD-SCNC: 1.69 MMOL/L (ref 0.4–2)
LACTATE BLD-SCNC: 2.33 MMOL/L (ref 0.4–2)
LACTATE SERPL-SCNC: 2.7 MMOL/L (ref 0.4–2)
LEUKOCYTE ESTERASE UR QL STRIP.AUTO: NEGATIVE
LYMPHOCYTES # BLD: 1 K/UL (ref 0.9–3.6)
LYMPHOCYTES NFR BLD: 15 % (ref 21–52)
MCH RBC QN AUTO: 29.2 PG (ref 24–34)
MCHC RBC AUTO-ENTMCNC: 33.3 G/DL (ref 31–37)
MCV RBC AUTO: 87.7 FL (ref 78–100)
MONOCYTES # BLD: 0.8 K/UL (ref 0.05–1.2)
MONOCYTES NFR BLD: 12 % (ref 3–10)
NEUTS SEG # BLD: 4.8 K/UL (ref 1.8–8)
NEUTS SEG NFR BLD: 71 % (ref 40–73)
NITRITE UR QL STRIP.AUTO: NEGATIVE
NRBC # BLD: 0 K/UL (ref 0–0.01)
NRBC BLD-RTO: 0 PER 100 WBC
PH UR STRIP: 5.5 (ref 5–8)
PLATELET # BLD AUTO: 128 K/UL (ref 135–420)
PMV BLD AUTO: 10.1 FL (ref 9.2–11.8)
POTASSIUM SERPL-SCNC: 3.8 MMOL/L (ref 3.5–5.5)
PROT SERPL-MCNC: 6.5 G/DL (ref 6.4–8.2)
PROT UR STRIP-MCNC: NEGATIVE MG/DL
RBC # BLD AUTO: 4.07 M/UL (ref 4.35–5.65)
SODIUM SERPL-SCNC: 139 MMOL/L (ref 136–145)
SP GR UR REFRACTOMETRY: 1.03 (ref 1–1.03)
TROPONIN-HIGH SENSITIVITY: 31 NG/L (ref 0–78)
UROBILINOGEN UR QL STRIP.AUTO: 0.2 EU/DL (ref 0.2–1)
WBC # BLD AUTO: 6.8 K/UL (ref 4.6–13.2)

## 2023-01-30 PROCEDURE — 96360 HYDRATION IV INFUSION INIT: CPT

## 2023-01-30 PROCEDURE — 81003 URINALYSIS AUTO W/O SCOPE: CPT

## 2023-01-30 PROCEDURE — 83605 ASSAY OF LACTIC ACID: CPT

## 2023-01-30 PROCEDURE — 80053 COMPREHEN METABOLIC PANEL: CPT

## 2023-01-30 PROCEDURE — 84484 ASSAY OF TROPONIN QUANT: CPT

## 2023-01-30 PROCEDURE — 83880 ASSAY OF NATRIURETIC PEPTIDE: CPT

## 2023-01-30 PROCEDURE — 74011250636 HC RX REV CODE- 250/636: Performed by: EMERGENCY MEDICINE

## 2023-01-30 PROCEDURE — 96361 HYDRATE IV INFUSION ADD-ON: CPT

## 2023-01-30 PROCEDURE — 93005 ELECTROCARDIOGRAM TRACING: CPT

## 2023-01-30 PROCEDURE — 70450 CT HEAD/BRAIN W/O DYE: CPT

## 2023-01-30 PROCEDURE — 71045 X-RAY EXAM CHEST 1 VIEW: CPT

## 2023-01-30 PROCEDURE — 99285 EMERGENCY DEPT VISIT HI MDM: CPT

## 2023-01-30 PROCEDURE — 85025 COMPLETE CBC W/AUTO DIFF WBC: CPT

## 2023-01-30 RX ADMIN — SODIUM CHLORIDE 1000 ML: 900 INJECTION, SOLUTION INTRAVENOUS at 16:39

## 2023-01-31 LAB
ATRIAL RATE: 62 BPM
CALCULATED P AXIS, ECG09: 35 DEGREES
CALCULATED R AXIS, ECG10: -34 DEGREES
CALCULATED T AXIS, ECG11: 28 DEGREES
DIAGNOSIS, 93000: NORMAL
P-R INTERVAL, ECG05: 280 MS
Q-T INTERVAL, ECG07: 470 MS
QRS DURATION, ECG06: 72 MS
QTC CALCULATION (BEZET), ECG08: 477 MS
VENTRICULAR RATE, ECG03: 62 BPM

## 2023-01-31 NOTE — ED PROVIDER NOTES
Patient is an 70-year-old male with past medical history significant for carotid artery stenosis, dementia, diabetes, hypertension, and hyperlipidemia, who was brought to the ED today by EMS for altered mental status. This patient and the family is well-known to me. I have taken care of him multiple times. The patient's wife called EMS. She has a certain degree of dementia and becomes concerned about him when he she is not as responsive to her as he should be. Per the son, the patient's wife seems to panic and has done this multiple times. They are trying to get the patient and his wife moved over to a family member's house where they can have more supervision of both individuals. The patient is ambulating and asking when he gets to leave.        Past Medical History:   Diagnosis Date    Carotid artery stenosis     Bilateral Carotid Artery Stenosis    Dementia (HCC)     Diabetes (HCC)     Glaucoma     High cholesterol     Hypertension     S/P CABG (coronary artery bypass graft)     CAD S/P CABG    S/P TAVR (transcatheter aortic valve replacement)        Past Surgical History:   Procedure Laterality Date    [place] UNLISTED PROCEDURE CARDIAC SURGERY      bypass         Family History:   Problem Relation Age of Onset    Diabetes Other        Social History     Socioeconomic History    Marital status:      Spouse name: Not on file    Number of children: Not on file    Years of education: Not on file    Highest education level: Not on file   Occupational History    Not on file   Tobacco Use    Smoking status: Never    Smokeless tobacco: Never   Substance and Sexual Activity    Alcohol use: No    Drug use: No    Sexual activity: Not on file   Other Topics Concern    Not on file   Social History Narrative    Not on file     Social Determinants of Health     Financial Resource Strain: Not on file   Food Insecurity: Not on file   Transportation Needs: Not on file   Physical Activity: Not on file   Stress: Not on file   Social Connections: Not on file   Intimate Partner Violence: Not on file   Housing Stability: Not on file         ALLERGIES: Patient has no known allergies. Review of Systems   All other systems reviewed and are negative. Vitals:    01/30/23 1620   BP: (!) 154/71   Pulse: 60   Resp: 16   Temp: 97.3 °F (36.3 °C)   SpO2: 96%   Weight: 75.6 kg (166 lb 11.2 oz)   Height: 5' 8\" (1.727 m)            Physical Exam  Vitals and nursing note reviewed. Constitutional:       Appearance: Normal appearance. HENT:      Head: Normocephalic and atraumatic. Right Ear: External ear normal.      Left Ear: External ear normal.      Nose: Nose normal.      Mouth/Throat:      Mouth: Mucous membranes are moist.      Pharynx: Oropharynx is clear. Eyes:      Extraocular Movements: Extraocular movements intact. Conjunctiva/sclera: Conjunctivae normal.      Pupils: Pupils are equal, round, and reactive to light. Cardiovascular:      Rate and Rhythm: Normal rate and regular rhythm. Pulses: Normal pulses. Heart sounds: Normal heart sounds. Pulmonary:      Effort: Pulmonary effort is normal.      Breath sounds: Normal breath sounds. Abdominal:      General: Abdomen is flat. Bowel sounds are normal.      Palpations: Abdomen is soft. Musculoskeletal:         General: Normal range of motion. Cervical back: Normal range of motion and neck supple. Skin:     General: Skin is warm and dry. Capillary Refill: Capillary refill takes less than 2 seconds. Neurological:      General: No focal deficit present. Mental Status: He is alert. Mental status is at baseline. Psychiatric:      Comments: Repeatedly asking when he can leave.       Recent Results (from the past 12 hour(s))   URINALYSIS W/ RFLX MICROSCOPIC    Collection Time: 01/30/23  4:15 PM   Result Value Ref Range    Color DARK YELLOW      Appearance CLEAR      Specific gravity 1.027 1.005 - 1.030      pH (UA) 5.5 5.0 - 8.0 Protein Negative NEG mg/dL    Glucose >1,000 (A) NEG mg/dL    Ketone Negative NEG mg/dL    Bilirubin Negative NEG      Blood Negative NEG      Urobilinogen 0.2 0.2 - 1.0 EU/dL    Nitrites Negative NEG      Leukocyte Esterase Negative NEG     POC LACTIC ACID    Collection Time: 01/30/23  4:29 PM   Result Value Ref Range    Lactic Acid (POC) 2.33 (HH) 0.40 - 2.00 mmol/L   CBC WITH AUTOMATED DIFF    Collection Time: 01/30/23  4:30 PM   Result Value Ref Range    WBC 6.8 4.6 - 13.2 K/uL    RBC 4.07 (L) 4.35 - 5.65 M/uL    HGB 11.9 (L) 13.0 - 16.0 g/dL    HCT 35.7 (L) 36.0 - 48.0 %    MCV 87.7 78.0 - 100.0 FL    MCH 29.2 24.0 - 34.0 PG    MCHC 33.3 31.0 - 37.0 g/dL    RDW 13.4 11.6 - 14.5 %    PLATELET 430 (L) 154 - 420 K/uL    MPV 10.1 9.2 - 11.8 FL    NRBC 0.0 0  WBC    ABSOLUTE NRBC 0.00 0.00 - 0.01 K/uL    NEUTROPHILS 71 40 - 73 %    LYMPHOCYTES 15 (L) 21 - 52 %    MONOCYTES 12 (H) 3 - 10 %    EOSINOPHILS 2 0 - 5 %    BASOPHILS 0 0 - 2 %    IMMATURE GRANULOCYTES 0 0.0 - 0.5 %    ABS. NEUTROPHILS 4.8 1.8 - 8.0 K/UL    ABS. LYMPHOCYTES 1.0 0.9 - 3.6 K/UL    ABS. MONOCYTES 0.8 0.05 - 1.2 K/UL    ABS. EOSINOPHILS 0.2 0.0 - 0.4 K/UL    ABS. BASOPHILS 0.0 0.0 - 0.1 K/UL    ABS. IMM. GRANS. 0.0 0.00 - 0.04 K/UL    DF AUTOMATED     METABOLIC PANEL, COMPREHENSIVE    Collection Time: 01/30/23  4:30 PM   Result Value Ref Range    Sodium 139 136 - 145 mmol/L    Potassium 3.8 3.5 - 5.5 mmol/L    Chloride 107 100 - 111 mmol/L    CO2 27 21 - 32 mmol/L    Anion gap 5 3.0 - 18 mmol/L    Glucose 175 (H) 74 - 99 mg/dL    BUN 18 7.0 - 18 MG/DL    Creatinine 1.44 (H) 0.6 - 1.3 MG/DL    BUN/Creatinine ratio 13 12 - 20      eGFR 49 (L) >60 ml/min/1.73m2    Calcium 8.9 8.5 - 10.1 MG/DL    Bilirubin, total 0.4 0.2 - 1.0 MG/DL    ALT (SGPT) 34 16 - 61 U/L    AST (SGOT) 29 10 - 38 U/L    Alk.  phosphatase 80 45 - 117 U/L    Protein, total 6.5 6.4 - 8.2 g/dL    Albumin 3.5 3.4 - 5.0 g/dL    Globulin 3.0 2.0 - 4.0 g/dL    A-G Ratio 1.2 0.8 - 1.7     NT-PRO BNP    Collection Time: 01/30/23  4:30 PM   Result Value Ref Range    NT pro- 0 - 1,800 PG/ML   TROPONIN-HIGH SENSITIVITY    Collection Time: 01/30/23  4:30 PM   Result Value Ref Range    Troponin-High Sensitivity 31 0 - 78 ng/L   EKG, 12 LEAD, INITIAL    Collection Time: 01/30/23  6:08 PM   Result Value Ref Range    Ventricular Rate 62 BPM    Atrial Rate 62 BPM    P-R Interval 280 ms    QRS Duration 72 ms    Q-T Interval 470 ms    QTC Calculation (Bezet) 477 ms    Calculated P Axis 35 degrees    Calculated R Axis -34 degrees    Calculated T Axis 28 degrees    Diagnosis       Sinus rhythm with 1st degree AV block  Left axis deviation  Minimal voltage criteria for LVH, may be normal variant ( R in aVL )  Anterior infarct , age undetermined  Abnormal ECG  When compared with ECG of 25-JAN-2023 06:43,  QRS axis shifted left       CT HEAD WO CONT   Final Result      No acute intracranial findings. Minimal periventricular low-attenuation, likely chronic microvascular ischemic   change. XR CHEST PORT   Final Result   Asbestos related pleural disease. No definite superimposed acute cardiopulmonary   findings. Medical Decision Making  The patient is an 42-year-old male with past medical history as listed above, who also has dementia, who lives with his wife who also has a certain degree of dementia. His wife panics about him and calls EMS on multiple occasions. She called today and stated that he was altered. Per EMS, on the way here he was awake and talking with no deficits. I have seen this patient multiple times and he is at his baseline. The only significant acute finding is that his lactic acid was elevated. We did not find any evidence of infection, CVA, or electrolyte abnormality. I also do not believe that he has acute coronary syndrome. He received 1 L of IV fluid.   His lactic acid returned to normal.  He will be discharged home and will be advised to follow-up with his primary care physician in 2 to 3 days. Return precautions have been given. Amount and/or Complexity of Data Reviewed  Labs: ordered. Radiology: ordered. ECG/medicine tests: ordered.            Procedures

## 2023-02-03 ENCOUNTER — APPOINTMENT (OUTPATIENT)
Dept: GENERAL RADIOLOGY | Age: 78
End: 2023-02-03
Attending: EMERGENCY MEDICINE
Payer: MEDICARE

## 2023-02-03 ENCOUNTER — HOSPITAL ENCOUNTER (EMERGENCY)
Age: 78
Discharge: HOME | End: 2023-02-04
Attending: EMERGENCY MEDICINE
Payer: MEDICARE

## 2023-02-03 VITALS
SYSTOLIC BLOOD PRESSURE: 164 MMHG | HEART RATE: 65 BPM | DIASTOLIC BLOOD PRESSURE: 72 MMHG | OXYGEN SATURATION: 95 % | RESPIRATION RATE: 18 BRPM | TEMPERATURE: 97.8 F

## 2023-02-03 DIAGNOSIS — R07.9 CHEST PAIN, UNSPECIFIED TYPE: Primary | ICD-10-CM

## 2023-02-03 LAB
ALBUMIN SERPL-MCNC: 3.7 G/DL (ref 3.4–5)
ALBUMIN/GLOB SERPL: 1.2 (ref 0.8–1.7)
ALP SERPL-CCNC: 80 U/L (ref 45–117)
ALT SERPL-CCNC: 30 U/L (ref 16–61)
ANION GAP SERPL CALC-SCNC: 5 MMOL/L (ref 3–18)
AST SERPL-CCNC: 33 U/L (ref 10–38)
BASOPHILS # BLD: 0 K/UL (ref 0–0.1)
BASOPHILS NFR BLD: 1 % (ref 0–2)
BILIRUB SERPL-MCNC: 0.5 MG/DL (ref 0.2–1)
BUN SERPL-MCNC: 18 MG/DL (ref 7–18)
BUN/CREAT SERPL: 13 (ref 12–20)
CALCIUM SERPL-MCNC: 9.4 MG/DL (ref 8.5–10.1)
CHLORIDE SERPL-SCNC: 108 MMOL/L (ref 100–111)
CO2 SERPL-SCNC: 28 MMOL/L (ref 21–32)
CREAT SERPL-MCNC: 1.4 MG/DL (ref 0.6–1.3)
DIFFERENTIAL METHOD BLD: ABNORMAL
EOSINOPHIL # BLD: 0.1 K/UL (ref 0–0.4)
EOSINOPHIL NFR BLD: 2 % (ref 0–5)
ERYTHROCYTE [DISTWIDTH] IN BLOOD BY AUTOMATED COUNT: 13.7 % (ref 11.6–14.5)
GLOBULIN SER CALC-MCNC: 3.1 G/DL (ref 2–4)
GLUCOSE SERPL-MCNC: 132 MG/DL (ref 74–99)
HCT VFR BLD AUTO: 37.1 % (ref 36–48)
HGB BLD-MCNC: 12.2 G/DL (ref 13–16)
IMM GRANULOCYTES # BLD AUTO: 0 K/UL (ref 0–0.04)
IMM GRANULOCYTES NFR BLD AUTO: 0 % (ref 0–0.5)
INR PPP: 1 (ref 0.8–1.2)
LYMPHOCYTES # BLD: 1.1 K/UL (ref 0.9–3.6)
LYMPHOCYTES NFR BLD: 16 % (ref 21–52)
MAGNESIUM SERPL-MCNC: 2.2 MG/DL (ref 1.6–2.6)
MCH RBC QN AUTO: 29 PG (ref 24–34)
MCHC RBC AUTO-ENTMCNC: 32.9 G/DL (ref 31–37)
MCV RBC AUTO: 88.1 FL (ref 78–100)
MONOCYTES # BLD: 0.9 K/UL (ref 0.05–1.2)
MONOCYTES NFR BLD: 12 % (ref 3–10)
NEUTS SEG # BLD: 5 K/UL (ref 1.8–8)
NEUTS SEG NFR BLD: 70 % (ref 40–73)
NRBC # BLD: 0 K/UL (ref 0–0.01)
NRBC BLD-RTO: 0 PER 100 WBC
PLATELET # BLD AUTO: 145 K/UL (ref 135–420)
PMV BLD AUTO: 10.3 FL (ref 9.2–11.8)
POTASSIUM SERPL-SCNC: 4 MMOL/L (ref 3.5–5.5)
PROT SERPL-MCNC: 6.8 G/DL (ref 6.4–8.2)
PROTHROMBIN TIME: 13.3 SEC (ref 11.5–15.2)
RBC # BLD AUTO: 4.21 M/UL (ref 4.35–5.65)
SODIUM SERPL-SCNC: 141 MMOL/L (ref 136–145)
TROPONIN I SERPL HS-MCNC: 10 NG/L (ref 0–78)
WBC # BLD AUTO: 7.2 K/UL (ref 4.6–13.2)

## 2023-02-03 PROCEDURE — 83735 ASSAY OF MAGNESIUM: CPT

## 2023-02-03 PROCEDURE — 84484 ASSAY OF TROPONIN QUANT: CPT

## 2023-02-03 PROCEDURE — 80053 COMPREHEN METABOLIC PANEL: CPT

## 2023-02-03 PROCEDURE — 99285 EMERGENCY DEPT VISIT HI MDM: CPT

## 2023-02-03 PROCEDURE — 85610 PROTHROMBIN TIME: CPT

## 2023-02-03 PROCEDURE — 93005 ELECTROCARDIOGRAM TRACING: CPT

## 2023-02-03 PROCEDURE — 71045 X-RAY EXAM CHEST 1 VIEW: CPT

## 2023-02-03 PROCEDURE — 85025 COMPLETE CBC W/AUTO DIFF WBC: CPT

## 2023-02-04 LAB
ATRIAL RATE: 68 BPM
CALCULATED P AXIS, ECG09: 61 DEGREES
CALCULATED R AXIS, ECG10: -36 DEGREES
CALCULATED T AXIS, ECG11: 37 DEGREES
DIAGNOSIS, 93000: NORMAL
P-R INTERVAL, ECG05: 300 MS
Q-T INTERVAL, ECG07: 436 MS
QRS DURATION, ECG06: 78 MS
QTC CALCULATION (BEZET), ECG08: 463 MS
TROPONIN I SERPL HS-MCNC: 11 NG/L (ref 0–78)
VENTRICULAR RATE, ECG03: 68 BPM

## 2023-02-04 NOTE — DISCHARGE INSTRUCTIONS
1.  Make an appointment follow-up with your primary care provider and cardiologist.  2.  Continue home medications as prescribed. 3.  Return to the emergency department for any worsening or concerning symptoms.

## 2023-02-04 NOTE — ED PROVIDER NOTES
EMERGENCY DEPARTMENT HISTORY AND PHYSICAL EXAM      Date: 2/3/2023  Patient Name: Brittany Maharaj      History of Presenting Illness     Chief Complaint   Patient presents with    Chest Pain (Angina)       Location/Duration/Severity/Modifying factors   Chief Complaint   Patient presents with    Chest Pain (Angina)       HPI:  Brittany Maharaj is a [de-identified] y.o. male with history as listed presents with a concern of chest pain with mild shortness of breath this morning per the patient's report. The patient's son is at bedside and states the patient has dementia and they are currently having him evaluated for this. Patient has not had fever or cough. He is at his baseline state of confusion secondary to dementia per the son's report. He has not had nausea vomiting or diaphoresis. Associated Symptoms:see ROS      There are no other complaints, changes, or physical findings at this time. PCP: Marylee Russel, MD    Current Outpatient Medications   Medication Sig Dispense Refill    MAGNESIUM GLYCINATE, BULK, Take 1 Tablet by mouth daily. escitalopram oxalate (LEXAPRO) 20 mg tablet Take 20 mg by mouth daily. rosuvastatin (CRESTOR) 20 mg tablet Take 20 mg by mouth nightly. memantine (NAMENDA) 5 mg tablet Take 5 mg by mouth daily. metFORMIN (GLUCOPHAGE) 500 mg tablet Take 500 mg by mouth daily (with dinner). Zinc Mth/Copper/Saw Palm/Gnsg (PROSTATE HEALTH FORMULA PO) Take  by mouth.      empagliflozin (JARDIANCE) 25 mg tablet Take 25 mg by mouth daily. metFORMIN (GLUCOPHAGE) 500 mg tablet Take 1,000 mg by mouth daily (with breakfast). glimepiride (AMARYL) 4 mg tablet Take 4 mg by mouth every morning. metoprolol tartrate (LOPRESSOR) 25 mg tablet Take 0.5 Tabs by mouth two (2) times a day.  (Patient taking differently: Take 25 mg by mouth two (2) times a day.) 30 Tab 0    glucose blood VI test strips strip       Accu-Chek Tracee Plus test strp strip       lancets misc aspirin delayed-release 81 mg tablet Take 81 mg by mouth daily. FIBER CHOICE PO Take 1 Tab by mouth daily. MULTIVITAMIN W/IRON, MINERALS (MULTIVITAMIN AND MINERALS PO) Take 1 Tab by mouth daily. Past History     Past Medical History:  Past Medical History:   Diagnosis Date    Carotid artery stenosis     Bilateral Carotid Artery Stenosis    Dementia (HCC)     Diabetes (HCC)     Glaucoma     High cholesterol     Hypertension     S/P CABG (coronary artery bypass graft)     CAD S/P CABG    S/P TAVR (transcatheter aortic valve replacement)        Past Surgical History:  Past Surgical History:   Procedure Laterality Date    [place] UNLISTED PROCEDURE CARDIAC SURGERY      bypass       Family History:  Family History   Problem Relation Age of Onset    Diabetes Other        Social History:  Social History     Tobacco Use    Smoking status: Never    Smokeless tobacco: Never   Substance Use Topics    Alcohol use: No    Drug use: No       Allergies:  No Known Allergies      Review of Systems     Review of Systems   Unable to perform ROS: Dementia   Respiratory:  Positive for shortness of breath. Cardiovascular:  Positive for chest pain. All other systems reviewed and are negative. Physical Exam     Physical Exam  Vitals and nursing note reviewed. Constitutional:       General: He is awake. He is not in acute distress. Appearance: Normal appearance. He is well-developed and well-groomed. He is not ill-appearing, toxic-appearing or diaphoretic. HENT:      Head: Normocephalic and atraumatic. Right Ear: External ear normal.      Left Ear: External ear normal.      Nose: Nose normal.      Mouth/Throat:      Mouth: Mucous membranes are moist.      Pharynx: Oropharynx is clear. No oropharyngeal exudate or posterior oropharyngeal erythema. Eyes:      General: Lids are normal. Vision grossly intact. Gaze aligned appropriately. No scleral icterus. Right eye: No discharge.          Left eye: No discharge. Extraocular Movements: Extraocular movements intact. Conjunctiva/sclera: Conjunctivae normal.      Pupils: Pupils are equal, round, and reactive to light. Neck:      Vascular: No carotid bruit. Trachea: Trachea and phonation normal.   Cardiovascular:      Rate and Rhythm: Normal rate and regular rhythm. Pulses: Normal pulses. Heart sounds: Normal heart sounds, S1 normal and S2 normal. No murmur heard. Pulmonary:      Effort: Pulmonary effort is normal. No respiratory distress. Breath sounds: Normal breath sounds and air entry. No wheezing or rales. Abdominal:      General: Bowel sounds are normal. There is no distension. Palpations: Abdomen is soft. Tenderness: There is no abdominal tenderness. There is no guarding. Musculoskeletal:         General: No swelling, deformity or signs of injury. Normal range of motion. Right shoulder: Normal. No swelling, deformity, tenderness, bony tenderness or crepitus. Left shoulder: Normal. No swelling, deformity, tenderness, bony tenderness or crepitus. Right upper arm: Normal. No swelling, edema, deformity, tenderness or bony tenderness. Left upper arm: Normal. No swelling, edema, deformity, tenderness or bony tenderness. Right elbow: No swelling or deformity. No tenderness. Left elbow: No swelling or deformity. No tenderness. Right forearm: No swelling, edema, deformity, tenderness or bony tenderness. Left forearm: No swelling, edema, deformity, tenderness or bony tenderness. Right wrist: No swelling, deformity, tenderness or crepitus. Normal pulse. Left wrist: No swelling, deformity, tenderness or crepitus. Normal pulse. Right hand: Normal. No swelling, deformity, tenderness or bony tenderness. Normal capillary refill. Left hand: Normal. No swelling, deformity, tenderness or bony tenderness. Normal capillary refill.       Cervical back: Normal, full passive range of motion without pain, normal range of motion and neck supple. No swelling, deformity, rigidity, tenderness, bony tenderness or crepitus. No pain with movement. Thoracic back: Normal.      Lumbar back: Normal.      Right hip: No deformity, tenderness, bony tenderness or crepitus. Left hip: No deformity, tenderness, bony tenderness or crepitus. Right upper leg: Normal.      Left upper leg: Normal.      Right knee: Normal. No swelling. Left knee: No swelling. Right lower leg: No swelling or tenderness. No edema. Left lower leg: No swelling or tenderness. No edema. Right ankle: Normal.      Left ankle: Normal.      Right foot: Normal. No swelling or deformity. Left foot: Normal. No swelling. Skin:     General: Skin is warm and dry. Neurological:      General: No focal deficit present. Mental Status: He is alert. Mental status is at baseline. He is disoriented and confused. GCS: GCS eye subscore is 4. GCS verbal subscore is 5. GCS motor subscore is 6. Cranial Nerves: Cranial nerves 2-12 are intact. No cranial nerve deficit. Sensory: Sensation is intact. No sensory deficit. Motor: Motor function is intact. No weakness. Coordination: Coordination is intact. Coordination normal.      Gait: Gait is intact. Psychiatric:         Attention and Perception: Attention and perception normal.         Mood and Affect: Mood and affect normal.         Speech: Speech normal.         Behavior: Behavior normal. Behavior is cooperative. Thought Content:  Thought content normal.         Cognition and Memory: Cognition and memory normal.         Judgment: Judgment normal.       Lab and Diagnostic Study Results     Labs -  Recent Results (from the past 24 hour(s))   EKG, 12 LEAD, INITIAL    Collection Time: 02/03/23  7:28 PM   Result Value Ref Range    Ventricular Rate 68 BPM    Atrial Rate 68 BPM    P-R Interval 300 ms    QRS Duration 78 ms    Q-T Interval 436 ms    QTC Calculation (Bezet) 463 ms    Calculated P Axis 61 degrees    Calculated R Axis -36 degrees    Calculated T Axis 37 degrees    Diagnosis       Sinus rhythm with 1st degree AV block  Left axis deviation  Minimal voltage criteria for LVH, may be normal variant ( R in aVL )  Abnormal ECG  When compared with ECG of 30-JAN-2023 18:08,  No significant change was found     CBC WITH AUTOMATED DIFF    Collection Time: 02/03/23  8:00 PM   Result Value Ref Range    WBC 7.2 4.6 - 13.2 K/uL    RBC 4.21 (L) 4.35 - 5.65 M/uL    HGB 12.2 (L) 13.0 - 16.0 g/dL    HCT 37.1 36.0 - 48.0 %    MCV 88.1 78.0 - 100.0 FL    MCH 29.0 24.0 - 34.0 PG    MCHC 32.9 31.0 - 37.0 g/dL    RDW 13.7 11.6 - 14.5 %    PLATELET 444 748 - 722 K/uL    MPV 10.3 9.2 - 11.8 FL    NRBC 0.0 0  WBC    ABSOLUTE NRBC 0.00 0.00 - 0.01 K/uL    NEUTROPHILS 70 40 - 73 %    LYMPHOCYTES 16 (L) 21 - 52 %    MONOCYTES 12 (H) 3 - 10 %    EOSINOPHILS 2 0 - 5 %    BASOPHILS 1 0 - 2 %    IMMATURE GRANULOCYTES 0 0.0 - 0.5 %    ABS. NEUTROPHILS 5.0 1.8 - 8.0 K/UL    ABS. LYMPHOCYTES 1.1 0.9 - 3.6 K/UL    ABS. MONOCYTES 0.9 0.05 - 1.2 K/UL    ABS. EOSINOPHILS 0.1 0.0 - 0.4 K/UL    ABS. BASOPHILS 0.0 0.0 - 0.1 K/UL    ABS. IMM.  GRANS. 0.0 0.00 - 0.04 K/UL    DF AUTOMATED     PROTHROMBIN TIME + INR    Collection Time: 02/03/23  8:00 PM   Result Value Ref Range    Prothrombin time 13.3 11.5 - 15.2 sec    INR 1.0 0.8 - 1.2     METABOLIC PANEL, COMPREHENSIVE    Collection Time: 02/03/23  8:00 PM   Result Value Ref Range    Sodium 141 136 - 145 mmol/L    Potassium 4.0 3.5 - 5.5 mmol/L    Chloride 108 100 - 111 mmol/L    CO2 28 21 - 32 mmol/L    Anion gap 5 3.0 - 18 mmol/L    Glucose 132 (H) 74 - 99 mg/dL    BUN 18 7.0 - 18 MG/DL    Creatinine 1.40 (H) 0.6 - 1.3 MG/DL    BUN/Creatinine ratio 13 12 - 20      eGFR 51 (L) >60 ml/min/1.73m2    Calcium 9.4 8.5 - 10.1 MG/DL    Bilirubin, total 0.5 0.2 - 1.0 MG/DL    ALT (SGPT) 30 16 - 61 U/L    AST (SGOT) 33 10 - 38 U/L Alk. phosphatase 80 45 - 117 U/L    Protein, total 6.8 6.4 - 8.2 g/dL    Albumin 3.7 3.4 - 5.0 g/dL    Globulin 3.1 2.0 - 4.0 g/dL    A-G Ratio 1.2 0.8 - 1.7     TROPONIN-HIGH SENSITIVITY    Collection Time: 02/03/23  8:00 PM   Result Value Ref Range    Troponin-High Sensitivity 10 0 - 78 ng/L   MAGNESIUM    Collection Time: 02/03/23  8:00 PM   Result Value Ref Range    Magnesium 2.2 1.6 - 2.6 mg/dL   TROPONIN-HIGH SENSITIVITY    Collection Time: 02/03/23 11:45 PM   Result Value Ref Range    Troponin-High Sensitivity 11 0 - 78 ng/L         Radiologic Studies -   XR CHEST PORT    (Results Pending)         Procedures and Critical Care       Performed by: Lisbeth Palmer MD    Procedures     EKG: Sinus rhythm with first-degree AV block and left axis deviation. 60 bpm with no acute ST segment elevation. Twelve-lead EKG is similar to previous twelve-lead EKG. Nir Bauman MD    Medical Decision Making and ED Course   - I am the first and primary provider for this patient AND AM THE PRIMARY PROVIDER OF RECORD. - I reviewed the vital signs, available nursing notes, past medical history, past surgical history, family history and social history. - Initial assessment performed. The patients presenting problems have been discussed, and the staff are in agreement with the care plan formulated and outlined with them. I have encouraged them to ask questions as they arise throughout their visit. Vital Signs-Reviewed the patient's vital signs. Patient Vitals for the past 12 hrs:   Temp Pulse Resp BP SpO2   02/03/23 2343 97.8 °F (36.6 °C) -- -- -- --   02/03/23 2011 -- 65 18 (!) 164/72 95 %         Provider Notes (Medical Decision Making):     MDM  Number of Diagnoses or Management Options  Chest pain, unspecified type  Diagnosis management comments: The patient presented to the emergency department with report of chest pain and shortness of breath.   He has confusion which is at his baseline per the son's report. Patient is currently in the process of being evaluated for dementia. The patient was without acute distress obvious discomfort in emergency department. Twelve-lead EKG did not demonstrate acute changes. Chest x-ray demonstrated chronic changes with a known history of asbestosis. The initial repeat troponin was negative. Laboratory studies were unremarkable. The patient and his son both requested to be discharged after the second troponin with a plan to follow-up with the PCP. They are to return immediately if any worsening or concerning symptoms. At disposition the patient is stable and in no acute distress or obvious discomfort. It is possible that the patient's symptoms are related to GERD. However review of his past medical history yielded the concern of possible cardiac etiology for the discomfort. The patient also might be experiencing pain secondary to asbestosis involving the pleural surface and along noted on chest x-ray. He will be discharged with his son. He is in no acute distress obvious discomfort. ED Course:          ------------------------------------------------------------------------------------------------------------        Consultations:       Consultations:       Disposition         Discharged      Diagnosis     Clinical Impression:   1.  Chest pain, unspecified type        Attestations:    Patty Ferreira MD

## 2023-02-07 ENCOUNTER — OFFICE VISIT (OUTPATIENT)
Dept: SURGERY | Age: 78
End: 2023-02-07
Payer: MEDICARE

## 2023-02-07 VITALS
HEIGHT: 64 IN | DIASTOLIC BLOOD PRESSURE: 64 MMHG | BODY MASS INDEX: 27.83 KG/M2 | HEART RATE: 61 BPM | OXYGEN SATURATION: 95 % | SYSTOLIC BLOOD PRESSURE: 126 MMHG | WEIGHT: 163 LBS

## 2023-02-07 DIAGNOSIS — F03.90 DEMENTIA, UNSPECIFIED DEMENTIA SEVERITY, UNSPECIFIED DEMENTIA TYPE, UNSPECIFIED WHETHER BEHAVIORAL, PSYCHOTIC, OR MOOD DISTURBANCE OR ANXIETY (HCC): ICD-10-CM

## 2023-02-07 DIAGNOSIS — R10.84 GENERALIZED ABDOMINAL PAIN: ICD-10-CM

## 2023-02-07 DIAGNOSIS — K80.20 GALLSTONES: Primary | ICD-10-CM

## 2023-02-07 PROCEDURE — 1123F ACP DISCUSS/DSCN MKR DOCD: CPT | Performed by: SURGERY

## 2023-02-07 PROCEDURE — G8427 DOCREV CUR MEDS BY ELIG CLIN: HCPCS | Performed by: SURGERY

## 2023-02-07 PROCEDURE — 99204 OFFICE O/P NEW MOD 45 MIN: CPT | Performed by: SURGERY

## 2023-02-07 PROCEDURE — G8536 NO DOC ELDER MAL SCRN: HCPCS | Performed by: SURGERY

## 2023-02-07 PROCEDURE — 1101F PT FALLS ASSESS-DOCD LE1/YR: CPT | Performed by: SURGERY

## 2023-02-07 PROCEDURE — G9717 DOC PT DX DEP/BP F/U NT REQ: HCPCS | Performed by: SURGERY

## 2023-02-07 PROCEDURE — G8417 CALC BMI ABV UP PARAM F/U: HCPCS | Performed by: SURGERY

## 2023-02-07 PROCEDURE — 3078F DIAST BP <80 MM HG: CPT | Performed by: SURGERY

## 2023-02-07 PROCEDURE — 3074F SYST BP LT 130 MM HG: CPT | Performed by: SURGERY

## 2023-02-07 NOTE — PROGRESS NOTES
Leslie Givens is a [de-identified] y.o. male (: 1945) presenting to address:    Chief Complaint   Patient presents with    New Patient     Cholelithiasis/ referred by Dr. Tawanna Vanessa       Medication list and allergies have been reviewed with Leslie Givens and updated as of today's date. I have gone over all Medical, Surgical and Social History with Brayan Munroe and updated/added the information accordingly.

## 2023-02-07 NOTE — PROGRESS NOTES
General Surgery Consult      Brayan GRACE Shaneka  Admit date: (Not on file)    MRN: [id number]     : 1945     Age: [de-identified] y.o. Attending Physician: Mayela Cherry MD, FACS      Subjective:     Amy Magaña is a [de-identified] y.o. male was referred to me by Dr. Barbi Delgado for evaluation of possible symptomatic cholelithiasis. The patient has dementia but he is very pleasant but he does not recall what is the pain. He is here today with his son who stated that the patient has been having pain for about 1 month now. He stated the pain happened maybe 2 or 3 times but sometimes the patient complained of discomfort after eating. It seems that recently over the last week the patient did not have any episodes of pain and he is doing better. He had a CT scan of abdomen and pelvis that showed cholelithiasis but no other pathology to explain his abdominal pain. According to the son the patient did not have any previous abdominal surgeries.      Patient Active Problem List    Diagnosis Date Noted    QT prolongation 2023    S/P CABG (coronary artery bypass graft) 2023    S/P TAVR (transcatheter aortic valve replacement) 2023    PVD (peripheral vascular disease) ([facility] [address] 2023    Hypertension 2023    Hyperlipidemia 2023    Dementia ([facility] [address] 2023    Depression 2023    BPH (benign prostatic hyperplasia) 2023    Type 2 diabetes mellitus, without long-term current use of insulin ([address] 2023    Syncope 10/11/2020     Past Medical History:   Diagnosis Date    Carotid artery stenosis     Bilateral Carotid Artery Stenosis    Dementia (HCC)     Diabetes (HCC)     Glaucoma     High cholesterol     Hypertension     S/P CABG (coronary artery bypass graft)     CAD S/P CABG    S/P TAVR (transcatheter aortic valve replacement)       Past Surgical History:   Procedure Laterality Date    [place] UNLISTED PROCEDURE CARDIAC SURGERY      bypass      Social History     Tobacco Use    Smoking status: Never    Smokeless tobacco: Never   Substance Use Topics    Alcohol use: No      Social History     Tobacco Use   Smoking Status Never   Smokeless Tobacco Never     Family History   Problem Relation Age of Onset    Diabetes Other       Current Outpatient Medications   Medication Sig    MAGNESIUM GLYCINATE, BULK, Take 1 Tablet by mouth daily. escitalopram oxalate (LEXAPRO) 20 mg tablet Take 20 mg by mouth daily. rosuvastatin (CRESTOR) 20 mg tablet Take 20 mg by mouth nightly. memantine (NAMENDA) 5 mg tablet Take 5 mg by mouth daily. metFORMIN (GLUCOPHAGE) 500 mg tablet Take 500 mg by mouth daily (with dinner). Zinc Mth/Copper/Saw Palm/Gnsg (PROSTATE HEALTH FORMULA PO) Take  by mouth.    empagliflozin (JARDIANCE) 25 mg tablet Take 25 mg by mouth daily. metFORMIN (GLUCOPHAGE) 500 mg tablet Take 1,000 mg by mouth daily (with breakfast). glimepiride (AMARYL) 4 mg tablet Take 4 mg by mouth every morning. metoprolol tartrate (LOPRESSOR) 25 mg tablet Take 0.5 Tabs by mouth two (2) times a day. (Patient taking differently: Take 25 mg by mouth two (2) times a day.)    glucose blood VI test strips strip     Accu-Chek Tracee Plus test strp strip     lancets misc     aspirin delayed-release 81 mg tablet Take 81 mg by mouth daily. FIBER CHOICE PO Take 1 Tab by mouth daily. MULTIVITAMIN W/IRON, MINERALS (MULTIVITAMIN AND MINERALS PO) Take 1 Tab by mouth daily. No current facility-administered medications for this visit. No Known Allergies     Review of Systems:  Review of systems not obtained due to patient factors.     Objective:     Visit Vitals  /64   Pulse 61   Ht 5' 4\" (1.626 m)   Wt 73.9 kg (163 lb)   SpO2 95%   BMI 27.98 kg/m²       Physical Exam:      General:  in no apparent distress   Eyes:  conjunctivae and sclerae normal, pupils equal, round, reactive to light   Throat & Neck: no erythema or exudates noted and neck supple and symmetrical; no palpable masses   Lungs:   clear to auscultation bilaterally   Heart:  Regular rate and rhythm   Abdomen:   rounded, soft, nontender, nondistended, no masses or organomegaly. No abdominal wall hernias. Extremities: extremities normal, atraumatic, no cyanosis or edema   Skin: Normal.         Imaging and Lab Review:     CBC:   Lab Results   Component Value Date/Time    WBC 7.2 02/03/2023 08:00 PM    RBC 4.21 (L) 02/03/2023 08:00 PM    HGB 12.2 (L) 02/03/2023 08:00 PM    HCT 37.1 02/03/2023 08:00 PM    PLATELET 969 16/78/6928 08:00 PM     BMP:   Lab Results   Component Value Date/Time    Glucose 132 (H) 02/03/2023 08:00 PM    Sodium 141 02/03/2023 08:00 PM    Potassium 4.0 02/03/2023 08:00 PM    Chloride 108 02/03/2023 08:00 PM    CO2 28 02/03/2023 08:00 PM    BUN 18 02/03/2023 08:00 PM    Creatinine 1.40 (H) 02/03/2023 08:00 PM    Calcium 9.4 02/03/2023 08:00 PM     CMP:  Lab Results   Component Value Date/Time    Glucose 132 (H) 02/03/2023 08:00 PM    Sodium 141 02/03/2023 08:00 PM    Potassium 4.0 02/03/2023 08:00 PM    Chloride 108 02/03/2023 08:00 PM    CO2 28 02/03/2023 08:00 PM    BUN 18 02/03/2023 08:00 PM    Creatinine 1.40 (H) 02/03/2023 08:00 PM    Calcium 9.4 02/03/2023 08:00 PM    Anion gap 5 02/03/2023 08:00 PM    BUN/Creatinine ratio 13 02/03/2023 08:00 PM    Alk. phosphatase 80 02/03/2023 08:00 PM    Protein, total 6.8 02/03/2023 08:00 PM    Albumin 3.7 02/03/2023 08:00 PM    Globulin 3.1 02/03/2023 08:00 PM    A-G Ratio 1.2 02/03/2023 08:00 PM       No results found for this or any previous visit (from the past 24 hour(s)). images and reports reviewed    Assessment:   Shakeel Grider is a [de-identified] y.o. male who has multiple comorbidities including dementia who has been having nonspecific abdominal pain for which we do not know the exact location but it seems that he is doing better. According to the son the patient has been having some discomfort after eating but not significant.   Giving all these factors as well as the CT scan showed only cholelithiasis I gave the son the option of observation versus ordering an ultrasound and a HIDA scan and we decided to just observe it for now and he will follow-up with me if needed.      Plan:     Observation  Follow-up as needed    Please call me if you have any questions (cell phone: [phone])     Signed By: Conor Handy MD     February 7, 2023

## 2023-02-17 ENCOUNTER — HOSPITAL ENCOUNTER (EMERGENCY)
Facility: HOSPITAL | Age: 78
Discharge: HOME | End: 2023-02-17
Attending: EMERGENCY MEDICINE
Payer: MEDICARE

## 2023-02-17 ENCOUNTER — APPOINTMENT (OUTPATIENT)
Facility: HOSPITAL | Age: 78
End: 2023-02-17
Payer: MEDICARE

## 2023-02-17 VITALS
DIASTOLIC BLOOD PRESSURE: 60 MMHG | RESPIRATION RATE: 17 BRPM | HEART RATE: 55 BPM | SYSTOLIC BLOOD PRESSURE: 124 MMHG | BODY MASS INDEX: 27.31 KG/M2 | WEIGHT: 160 LBS | HEIGHT: 64 IN | TEMPERATURE: 97.9 F | OXYGEN SATURATION: 95 %

## 2023-02-17 DIAGNOSIS — R07.9 CHEST PAIN, UNSPECIFIED TYPE: Primary | ICD-10-CM

## 2023-02-17 LAB
ALBUMIN SERPL-MCNC: 2.8 G/DL (ref 3.4–5)
ALBUMIN/GLOB SERPL: 1.2 (ref 0.8–1.7)
ALP SERPL-CCNC: 79 U/L (ref 45–117)
ALT SERPL-CCNC: 22 U/L (ref 16–61)
ANION GAP SERPL CALC-SCNC: 5 MMOL/L (ref 3–18)
AST SERPL-CCNC: 17 U/L (ref 10–38)
BASOPHILS # BLD: 0 K/UL (ref 0–0.1)
BASOPHILS NFR BLD: 0 % (ref 0–2)
BILIRUB SERPL-MCNC: 0.3 MG/DL (ref 0.2–1)
BUN SERPL-MCNC: 19 MG/DL (ref 7–18)
BUN/CREAT SERPL: 14 (ref 12–20)
CALCIUM SERPL-MCNC: 7.9 MG/DL (ref 8.5–10.1)
CHLORIDE SERPL-SCNC: 118 MMOL/L (ref 100–111)
CO2 SERPL-SCNC: 24 MMOL/L (ref 21–32)
CREAT SERPL-MCNC: 1.32 MG/DL (ref 0.6–1.3)
D DIMER PPP FEU-MCNC: 0.55 UG/ML(FEU)
DIFFERENTIAL METHOD BLD: ABNORMAL
EKG ATRIAL RATE: 61 BPM
EKG DIAGNOSIS: NORMAL
EKG P AXIS: 37 DEGREES
EKG P-R INTERVAL: 268 MS
EKG Q-T INTERVAL: 460 MS
EKG QRS DURATION: 80 MS
EKG QTC CALCULATION (BAZETT): 463 MS
EKG R AXIS: -25 DEGREES
EKG T AXIS: 42 DEGREES
EKG VENTRICULAR RATE: 61 BPM
EOSINOPHIL # BLD: 0.2 K/UL (ref 0–0.4)
EOSINOPHIL NFR BLD: 3 % (ref 0–5)
ERYTHROCYTE [DISTWIDTH] IN BLOOD BY AUTOMATED COUNT: 13.2 % (ref 11.6–14.5)
GLOBULIN SER CALC-MCNC: 2.4 G/DL (ref 2–4)
GLUCOSE SERPL-MCNC: 156 MG/DL (ref 74–99)
HCT VFR BLD AUTO: 32.5 % (ref 36–48)
HGB BLD-MCNC: 10.4 G/DL (ref 13–16)
IMM GRANULOCYTES # BLD AUTO: 0 K/UL (ref 0–0.04)
IMM GRANULOCYTES NFR BLD AUTO: 0 % (ref 0–0.5)
LYMPHOCYTES # BLD: 1 K/UL (ref 0.9–3.6)
LYMPHOCYTES NFR BLD: 16 % (ref 21–52)
MAGNESIUM SERPL-MCNC: 1.9 MG/DL (ref 1.6–2.6)
MCH RBC QN AUTO: 28.4 PG (ref 24–34)
MCHC RBC AUTO-ENTMCNC: 32 G/DL (ref 31–37)
MCV RBC AUTO: 88.8 FL (ref 78–100)
MONOCYTES # BLD: 0.7 K/UL (ref 0.05–1.2)
MONOCYTES NFR BLD: 11 % (ref 3–10)
NEUTS SEG # BLD: 4.6 K/UL (ref 1.8–8)
NEUTS SEG NFR BLD: 70 % (ref 40–73)
NRBC # BLD: 0 K/UL (ref 0–0.01)
NRBC BLD-RTO: 0 PER 100 WBC
NT PRO BNP: 133 PG/ML (ref 0–1800)
PLATELET # BLD AUTO: 131 K/UL (ref 135–420)
PMV BLD AUTO: 10 FL (ref 9.2–11.8)
POTASSIUM SERPL-SCNC: 3 MMOL/L (ref 3.5–5.5)
PROT SERPL-MCNC: 5.2 G/DL (ref 6.4–8.2)
RBC # BLD AUTO: 3.66 M/UL (ref 4.35–5.65)
SODIUM SERPL-SCNC: 147 MMOL/L (ref 136–145)
TROPONIN I SERPL HS-MCNC: 11 NG/L (ref 0–78)
TROPONIN I SERPL HS-MCNC: 9 NG/L (ref 0–78)
WBC # BLD AUTO: 6.6 K/UL (ref 4.6–13.2)

## 2023-02-17 PROCEDURE — 80053 COMPREHEN METABOLIC PANEL: CPT

## 2023-02-17 PROCEDURE — 83880 ASSAY OF NATRIURETIC PEPTIDE: CPT

## 2023-02-17 PROCEDURE — 84484 ASSAY OF TROPONIN QUANT: CPT

## 2023-02-17 PROCEDURE — 71045 X-RAY EXAM CHEST 1 VIEW: CPT

## 2023-02-17 PROCEDURE — 85025 COMPLETE CBC W/AUTO DIFF WBC: CPT

## 2023-02-17 PROCEDURE — 93005 ELECTROCARDIOGRAM TRACING: CPT | Performed by: EMERGENCY MEDICINE

## 2023-02-17 PROCEDURE — 99285 EMERGENCY DEPT VISIT HI MDM: CPT

## 2023-02-17 PROCEDURE — 83735 ASSAY OF MAGNESIUM: CPT

## 2023-02-17 PROCEDURE — 85379 FIBRIN DEGRADATION QUANT: CPT

## 2023-02-17 RX ORDER — POTASSIUM CHLORIDE 20 MEQ/1
40 TABLET, EXTENDED RELEASE ORAL
Status: DISCONTINUED | OUTPATIENT
Start: 2023-02-17 | End: 2023-02-17 | Stop reason: HOSPADM

## 2023-02-17 NOTE — ED TRIAGE NOTES
Pt arrives via EMS from home with C/O sharp intermittent chest pain that began \"a few days ago\".  Denies chest pain or shortness of breath at this time

## 2023-02-17 NOTE — ED PROVIDER NOTES
EMERGENCY DEPARTMENT HISTORY AND PHYSICAL EXAM    9:34 AM patient seen at this time in room 9 with his son      Date: 2/17/2023  Patient Name: Isa May    History of Presenting Illness     Chief Complaint   Patient presents with    Chest Pain         History Provided By: patient and patient's son    Additional History (Context): Isa May is a [de-identified] y.o. male presents with numerous visits to the emergency department and attention is brought to his visits on.  January 7, 16, 25, second visit on the 25th, 30th, then February 3 noted to have confusion at baseline and becomes agitated after few hours in the ER, sundowning symptoms. Nursing note tonight indicates he contacted EMS for sharp chest pain and trouble breathing. He did call his son and talk to them on the phone and son says he sounded fine and in no distress but did want to go to the hospital.  At the time my first exam he complains only that his feet are cold and does not remember why he called to come to the hospital.  Certainly no complaint of chest pain or shortness of breath. In no acute distress.     PCP: Andra Farah MD    Chief Complaint:   Duration:    Timing:    Location:   Quality:   Severity:   Modifying Factors:   Associated Symptoms:       Current Facility-Administered Medications   Medication Dose Route Frequency Provider Last Rate Last Admin    potassium chloride (KLOR-CON M) extended release tablet 40 mEq  40 mEq Oral NOW Italia Hirsch MD         Current Outpatient Medications   Medication Sig Dispense Refill    Inulin (FIBER CHOICE PO) Take 1 tablet by mouth daily      aspirin 81 MG EC tablet Take 81 mg by mouth daily      clopidogrel (PLAVIX) 75 MG tablet Take 75 mg by mouth daily      donepezil (ARICEPT) 10 MG tablet Take 10 mg by mouth      empagliflozin (JARDIANCE) 25 MG tablet Take 25 mg by mouth daily      ezetimibe-simvastatin (VYTORIN) 10-40 MG per tablet Take 1 tablet by mouth      glimepiride (AMARYL) 4 MG tablet Take 4 mg by mouth      metFORMIN (GLUCOPHAGE) 500 MG tablet Take 500 mg by mouth daily (with breakfast)      metoprolol tartrate (LOPRESSOR) 25 MG tablet Take 12.5 mg by mouth 2 times daily         Past History     Past Medical History:  Past Medical History:   Diagnosis Date    Carotid artery stenosis     Bilateral Carotid Artery Stenosis    Dementia (HCC)     Diabetes (HCC)     Glaucoma     High cholesterol     Hypertension     S/P CABG (coronary artery bypass graft)     CAD S/P CABG    S/P TAVR (transcatheter aortic valve replacement)        Past Surgical History:  Past Surgical History:   Procedure Laterality Date    CT UNLISTED PROCEDURE CARDIAC SURGERY      bypass       Family History:  Family History   Problem Relation Age of Onset    Diabetes Other        Social History:  Social History     Tobacco Use    Smoking status: Never    Smokeless tobacco: Never   Substance Use Topics    Alcohol use: No    Drug use: No       Allergies:  No Known Allergies      Review of Systems     Review of Systems      Physical Exam       Patient Vitals for the past 12 hrs:   Temp Pulse Resp BP SpO2   02/17/23 0700 -- 55 17 124/60 95 %   02/17/23 0630 -- 75 17 124/80 97 %   02/17/23 0600 -- 54 12 131/61 91 %   02/17/23 0530 -- 56 15 (!) 129/56 --   02/17/23 0404 97.9 °F (36.6 °C) 63 18 130/62 99 %       IPVITALS  Patient Vitals for the past 24 hrs:   BP Temp Temp src Pulse Resp SpO2 Height Weight   02/17/23 0700 124/60 -- -- 55 17 95 % -- --   02/17/23 0630 124/80 -- -- 75 17 97 % -- --   02/17/23 0600 131/61 -- -- 54 12 91 % -- --   02/17/23 0530 (!) 129/56 -- -- 56 15 -- -- --   02/17/23 0404 130/62 97.9 °F (36.6 °C) Oral 63 18 99 % 5' 4\" (1.626 m) 160 lb (72.6 kg)       Physical Exam  Constitutional:       Appearance: Normal appearance. He is obese. Comments: Appears stated age. HENT:      Head: Normocephalic and atraumatic. Cardiovascular:      Rate and Rhythm: Normal rate and regular rhythm. Pulmonary:      Effort: Pulmonary effort is normal.      Breath sounds: Normal breath sounds. Abdominal:      Tenderness: There is no abdominal tenderness. Musculoskeletal:         General: Normal range of motion. Cervical back: Normal range of motion and neck supple. Skin:     General: Skin is warm and dry. Neurological:      General: No focal deficit present. Mental Status: He is alert. Cranial Nerves: No cranial nerve deficit. Comments: Confused, not oriented to time or year.   Does not recall his original complaint that prompted him to come to the emergency department         Diagnostic Study Results   Labs -  Recent Results (from the past 24 hour(s))   CBC with Auto Differential    Collection Time: 02/17/23  5:40 AM   Result Value Ref Range    WBC 6.6 4.6 - 13.2 K/uL    RBC 3.66 (L) 4.35 - 5.65 M/uL    Hemoglobin 10.4 (L) 13.0 - 16.0 g/dL    Hematocrit 32.5 (L) 36.0 - 48.0 %    MCV 88.8 78.0 - 100.0 FL    MCH 28.4 24.0 - 34.0 PG    MCHC 32.0 31.0 - 37.0 g/dL    RDW 13.2 11.6 - 14.5 %    Platelets 899 (L) 399 - 420 K/uL    MPV 10.0 9.2 - 11.8 FL    Nucleated RBCs 0.0 0  WBC    nRBC 0.00 0.00 - 0.01 K/uL    Seg Neutrophils 70 40 - 73 %    Lymphocytes 16 (L) 21 - 52 %    Monocytes 11 (H) 3 - 10 %    Eosinophils % 3 0 - 5 %    Basophils 0 0 - 2 %    Immature Granulocytes 0 0.0 - 0.5 %    Segs Absolute 4.6 1.8 - 8.0 K/UL    Absolute Lymph # 1.0 0.9 - 3.6 K/UL    Absolute Mono # 0.7 0.05 - 1.2 K/UL    Absolute Eos # 0.2 0.0 - 0.4 K/UL    Basophils Absolute 0.0 0.0 - 0.1 K/UL    Absolute Immature Granulocyte 0.0 0.00 - 0.04 K/UL    Differential Type AUTOMATED     Magnesium    Collection Time: 02/17/23  5:40 AM   Result Value Ref Range    Magnesium 1.9 1.6 - 2.6 mg/dL   CMP    Collection Time: 02/17/23  5:40 AM   Result Value Ref Range    Sodium 147 (H) 136 - 145 mmol/L    Potassium 3.0 (L) 3.5 - 5.5 mmol/L    Chloride 118 (H) 100 - 111 mmol/L    CO2 24 21 - 32 mmol/L    Anion Gap 5 3.0 - 18 mmol/L    Glucose 156 (H) 74 - 99 mg/dL    BUN 19 (H) 7.0 - 18 MG/DL    Creatinine 1.32 (H) 0.6 - 1.3 MG/DL    Bun/Cre Ratio 14 12 - 20      Est, Glom Filt Rate 55 (L) >60 ml/min/1.73m2    Calcium 7.9 (L) 8.5 - 10.1 MG/DL    Total Bilirubin 0.3 0.2 - 1.0 MG/DL    ALT 22 16 - 61 U/L    AST 17 10 - 38 U/L    Alk Phosphatase 79 45 - 117 U/L    Total Protein 5.2 (L) 6.4 - 8.2 g/dL    Albumin 2.8 (L) 3.4 - 5.0 g/dL    Globulin 2.4 2.0 - 4.0 g/dL    Albumin/Globulin Ratio 1.2 0.8 - 1.7     Troponin    Collection Time: 02/17/23  5:40 AM   Result Value Ref Range    Troponin, High Sensitivity 9 0 - 78 ng/L   Brain Natriuretic Peptide    Collection Time: 02/17/23  5:40 AM   Result Value Ref Range    NT Pro- 0 - 1,800 PG/ML   D-Dimer, Quantitative    Collection Time: 02/17/23  6:21 AM   Result Value Ref Range    D-Dimer, Quant 0.55 (H) <0.46 ug/ml(FEU)   Troponin    Collection Time: 02/17/23  8:41 AM   Result Value Ref Range    Troponin, High Sensitivity 11 0 - 78 ng/L       Radiologic Studies -   XR CHEST PORTABLE   Final Result   No radiographic evidence of acute cardiopulmonary process. Asbestos related   pleural disease. Hypoinflation. [unfilled]    Medications ordered:   Medications   potassium chloride (KLOR-CON M) extended release tablet 40 mEq (has no administration in time range)         Medical Decision Making   Initial Medical Decision Making and DDx:  Twelve-lead EKG sinus rhythm first-degree AV block narrow complex at a rate of 61 without ischemic change  Labs are pending  Chest x-ray by my interpretation right fourth posterior rib fracture chronic, can be seen on x-ray from 2/3/2023 CBC benign waiting on chemistry and troponin. Patient is pain-free at this point. Family is trying to move the patient and his wife into residence with other family to try and help them out.   They will get worried about something in either the patient or his wife will call EMS to come into the hospital.  They are poor historians and cannot provide extensive detailed descriptions of the events that occurred. Attention to Dr. Goodwin Mace discharge summary within the last month. Clinical presentation at this point not consistent with ACS stroke syncope systemic illness such as sepsis, pneumonia. Anticipate uneventful discharge. Son is in agreement with this and is present here in the emergency department. ED Course: Progress Notes, Reevaluation, and Consults:  ED Course as of 02/17/23 0934   Fri Feb 17, 2023   3341 Updated son on the plan. Per nursing, patient is a bit restless pacing around. Redirectable. [CB]      ED Course User Index  [CB] Jacklyn Valenzuela MD     9:34 AM negative troponin benign EKG patient is chest pain-free no tachycardia or hypoxia I do not feel D-dimer was indicated this was ordered by staff preemptively. Do not feel there is significant risk for PE. Patient is now as predicted getting a little bit inpatient. Will get delta Trope and anticipate uneventful discharge and is pain-free at this time    9:34 AM negative repeat troponin patient's up and ambulatory being managed by his son will be discharged. No ACS. Doubt aortic disease or PE. Family is happy with the plan. I am the first provider for this patient. I reviewed the vital signs, available nursing notes, past medical history, past surgical history, family history and social history. Patient Vitals for the past 12 hrs:   Temp Pulse Resp BP SpO2   02/17/23 0700 -- 55 17 124/60 95 %   02/17/23 0630 -- 75 17 124/80 97 %   02/17/23 0600 -- 54 12 131/61 91 %   02/17/23 0530 -- 56 15 (!) 129/56 --   02/17/23 0404 97.9 °F (36.6 °C) 63 18 130/62 99 %       Vital Signs-Reviewed the patient's vital signs. Pulse Oximetry Analysis, Cardiac Monitor, 12 lead ekg:      Interpreted by the EP.       Records Reviewed: Nursing notes reviewed (Time of Review: 9:34 AM)    Procedures:   Critical Care Time: 0  If critical care time is note it is exclusive of any separately billable procedures. Aspirin: (was aspirin given for stroke?)    Diagnosis     Clinical Impression:   1.  Chest pain, unspecified type        Disposition: [unfilled]      @[facility]@     @[email or web address]@    [facility] for 2023:  DISCHARGE MEDICATIONS:  Current Discharge Medication List             Details   Inulin (FIBER CHOICE PO) Take 1 tablet by mouth daily      aspirin 81 MG EC tablet Take 81 mg by mouth daily      clopidogrel (PLAVIX) 75 MG tablet Take 75 mg by mouth daily      donepezil (ARICEPT) 10 MG tablet Take 10 mg by mouth      empagliflozin (JARDIANCE) 25 MG tablet Take 25 mg by mouth daily      ezetimibe-simvastatin (VYTORIN) 10-40 MG per tablet Take 1 tablet by mouth      glimepiride (AMARYL) 4 MG tablet Take 4 mg by mouth      metFORMIN (GLUCOPHAGE) 500 MG tablet Take 500 mg by mouth daily (with breakfast)      metoprolol tartrate (LOPRESSOR) 25 MG tablet Take 12.5 mg by mouth 2 times daily             DISCONTINUED MEDICATIONS:  Current Discharge Medication List          PATIENT REFERRED TO:  Follow Up with:  Chalino Lopez MD  [address] [place] [address]  [phone]        _______________________________    Notes:    Arian Wilson MD using Dragon dictation      _______________________________     Maddi Su MD  02/17/23 5774

## 2023-02-17 NOTE — ED NOTES
Assumed care of pt in rm 9. Per report pt was here for CP. Pt denies any current CP. Resting comfortable on stretcher. Repeat Trop drawn per MD order. Family at bedside.       Shell Farmer RN  02/17/23 2524

## 2023-02-24 ENCOUNTER — HOSPITAL ENCOUNTER (EMERGENCY)
Facility: HOSPITAL | Age: 78
Discharge: HOME | End: 2023-02-24
Attending: EMERGENCY MEDICINE
Payer: MEDICARE

## 2023-02-24 VITALS
SYSTOLIC BLOOD PRESSURE: 158 MMHG | OXYGEN SATURATION: 96 % | RESPIRATION RATE: 16 BRPM | HEART RATE: 64 BPM | TEMPERATURE: 97.5 F | DIASTOLIC BLOOD PRESSURE: 74 MMHG

## 2023-02-24 DIAGNOSIS — F03.90 DEMENTIA, UNSPECIFIED DEMENTIA SEVERITY, UNSPECIFIED DEMENTIA TYPE, UNSPECIFIED WHETHER BEHAVIORAL, PSYCHOTIC, OR MOOD DISTURBANCE OR ANXIETY (HCC): ICD-10-CM

## 2023-02-24 DIAGNOSIS — R73.9 HYPERGLYCEMIA: Primary | ICD-10-CM

## 2023-02-24 LAB
ALBUMIN SERPL-MCNC: 4 G/DL (ref 3.4–5)
ALBUMIN/GLOB SERPL: 1.1 (ref 0.8–1.7)
ALP SERPL-CCNC: 119 U/L (ref 45–117)
ALT SERPL-CCNC: 40 U/L (ref 16–61)
ANION GAP SERPL CALC-SCNC: 9 MMOL/L (ref 3–18)
APPEARANCE UR: ABNORMAL
AST SERPL-CCNC: 40 U/L (ref 10–38)
BACTERIA URNS QL MICRO: ABNORMAL /HPF
BASOPHILS # BLD: 0 K/UL (ref 0–0.1)
BASOPHILS NFR BLD: 1 % (ref 0–2)
BILIRUB SERPL-MCNC: 0.6 MG/DL (ref 0.2–1)
BILIRUB UR QL: NEGATIVE
BUN SERPL-MCNC: 19 MG/DL (ref 7–18)
BUN/CREAT SERPL: 11 (ref 12–20)
CALCIUM SERPL-MCNC: 9.2 MG/DL (ref 8.5–10.1)
CHLORIDE SERPL-SCNC: 105 MMOL/L (ref 100–111)
CO2 SERPL-SCNC: 24 MMOL/L (ref 21–32)
COLOR UR: YELLOW
CREAT SERPL-MCNC: 1.67 MG/DL (ref 0.6–1.3)
DIFFERENTIAL METHOD BLD: ABNORMAL
EKG ATRIAL RATE: 63 BPM
EKG DIAGNOSIS: NORMAL
EKG P AXIS: 81 DEGREES
EKG P-R INTERVAL: 280 MS
EKG Q-T INTERVAL: 456 MS
EKG QRS DURATION: 84 MS
EKG QTC CALCULATION (BAZETT): 466 MS
EKG R AXIS: -32 DEGREES
EKG T AXIS: 34 DEGREES
EKG VENTRICULAR RATE: 63 BPM
EOSINOPHIL # BLD: 0.1 K/UL (ref 0–0.4)
EOSINOPHIL NFR BLD: 2 % (ref 0–5)
EPITH CASTS URNS QL MICRO: ABNORMAL /LPF (ref 0–5)
ERYTHROCYTE [DISTWIDTH] IN BLOOD BY AUTOMATED COUNT: 13.3 % (ref 11.6–14.5)
GLOBULIN SER CALC-MCNC: 3.6 G/DL (ref 2–4)
GLUCOSE BLD STRIP.AUTO-MCNC: 203 MG/DL (ref 70–110)
GLUCOSE SERPL-MCNC: 337 MG/DL (ref 74–99)
GLUCOSE UR STRIP.AUTO-MCNC: >1000 MG/DL
HCT VFR BLD AUTO: 40.9 % (ref 36–48)
HGB BLD-MCNC: 13.3 G/DL (ref 13–16)
HGB UR QL STRIP: ABNORMAL
IMM GRANULOCYTES # BLD AUTO: 0 K/UL (ref 0–0.04)
IMM GRANULOCYTES NFR BLD AUTO: 0 % (ref 0–0.5)
INR PPP: 1 (ref 0.8–1.2)
KETONES UR QL STRIP.AUTO: NEGATIVE MG/DL
LEUKOCYTE ESTERASE UR QL STRIP.AUTO: ABNORMAL
LYMPHOCYTES # BLD: 0.7 K/UL (ref 0.9–3.6)
LYMPHOCYTES NFR BLD: 12 % (ref 21–52)
MCH RBC QN AUTO: 28.7 PG (ref 24–34)
MCHC RBC AUTO-ENTMCNC: 32.5 G/DL (ref 31–37)
MCV RBC AUTO: 88.3 FL (ref 78–100)
MONOCYTES # BLD: 0.5 K/UL (ref 0.05–1.2)
MONOCYTES NFR BLD: 9 % (ref 3–10)
MUCOUS THREADS URNS QL MICRO: ABNORMAL /LPF
NEUTS SEG # BLD: 4.4 K/UL (ref 1.8–8)
NEUTS SEG NFR BLD: 76 % (ref 40–73)
NITRITE UR QL STRIP.AUTO: NEGATIVE
NRBC # BLD: 0 K/UL (ref 0–0.01)
NRBC BLD-RTO: 0 PER 100 WBC
PH UR STRIP: 5.5 (ref 5–8)
PLATELET # BLD AUTO: 139 K/UL (ref 135–420)
PMV BLD AUTO: 10.3 FL (ref 9.2–11.8)
POTASSIUM SERPL-SCNC: 4.1 MMOL/L (ref 3.5–5.5)
PROT SERPL-MCNC: 7.6 G/DL (ref 6.4–8.2)
PROT UR STRIP-MCNC: 30 MG/DL
PROTHROMBIN TIME: 13.4 SEC (ref 11.5–15.2)
RBC # BLD AUTO: 4.63 M/UL (ref 4.35–5.65)
RBC #/AREA URNS HPF: ABNORMAL /HPF (ref 0–5)
SODIUM SERPL-SCNC: 138 MMOL/L (ref 136–145)
SP GR UR REFRACTOMETRY: >1.03 (ref 1–1.03)
UROBILINOGEN UR QL STRIP.AUTO: 0.2 EU/DL (ref 0.2–1)
WBC # BLD AUTO: 5.8 K/UL (ref 4.6–13.2)
WBC URNS QL MICRO: ABNORMAL /HPF (ref 0–4)

## 2023-02-24 PROCEDURE — 93005 ELECTROCARDIOGRAM TRACING: CPT | Performed by: EMERGENCY MEDICINE

## 2023-02-24 PROCEDURE — 87186 SC STD MICRODIL/AGAR DIL: CPT

## 2023-02-24 PROCEDURE — 81001 URINALYSIS AUTO W/SCOPE: CPT

## 2023-02-24 PROCEDURE — 2580000003 HC RX 258: Performed by: HEALTH CARE PROVIDER

## 2023-02-24 PROCEDURE — 85610 PROTHROMBIN TIME: CPT

## 2023-02-24 PROCEDURE — 99284 EMERGENCY DEPT VISIT MOD MDM: CPT

## 2023-02-24 PROCEDURE — 87086 URINE CULTURE/COLONY COUNT: CPT

## 2023-02-24 PROCEDURE — 87077 CULTURE AEROBIC IDENTIFY: CPT

## 2023-02-24 PROCEDURE — 82962 GLUCOSE BLOOD TEST: CPT

## 2023-02-24 PROCEDURE — 85025 COMPLETE CBC W/AUTO DIFF WBC: CPT

## 2023-02-24 PROCEDURE — 80053 COMPREHEN METABOLIC PANEL: CPT

## 2023-02-24 RX ORDER — 0.9 % SODIUM CHLORIDE 0.9 %
1000 INTRAVENOUS SOLUTION INTRAVENOUS ONCE
Status: COMPLETED | OUTPATIENT
Start: 2023-02-24 | End: 2023-02-24

## 2023-02-24 RX ADMIN — SODIUM CHLORIDE 1000 ML: 900 INJECTION, SOLUTION INTRAVENOUS at 13:23

## 2023-02-24 NOTE — ED NOTES
Pt does not want to wait any longer, per PA pt's BS is lower and pt can leave.      Charletta Leventhal, RN  02/24/23 3349

## 2023-02-24 NOTE — ED NOTES
Pt refused to wait for IV fluids to be finished and wants IV removed. Pt has received 800 ml IV fluids. BS rechecked is 203.      Du Vitale RN  02/24/23 8306       Du Vitale RN  02/24/23 9505

## 2023-02-24 NOTE — ED TRIAGE NOTES
Pt. Arrives to the ED with wife endorsing that he does not know what is going on. Pt. Is very altered at this time. Pt. Wife reports he is more confused than normal at this time.

## 2023-02-24 NOTE — ED NOTES
EMERGENCY DEPARTMENT HISTORY AND PHYSICAL EXAM        Date: 2/24/2023  Patient Name: Saira Good    History of Presenting Illness     Chief Complaint   Patient presents with    Altered Mental Status     Wife states hes more confused than normal.        History Provided By: History obtained from patient, son, wife    HPI: Saira Good, [de-identified] y.o. male PMHx significant for demenita, T2DM, CAD presents by personal vehicle to the ED with cc of nonspecific complaints. Patient is nonfocused and shifts topics during history taking. Patient has dementia. Initially patient stated he was here for spots on his skin. Triage note stated that wife had concerns about altered mental status. Discussion with son in the waiting room revealed today's behavior is typical.  That the patient has occasional impulse to be seen in the hospital.  Family and patient do not report concerns for systemic illness, no recent nausea, vomiting, diarrhea, fever, chill, no cough, no leg swelling. There are no other complaints, changes, or physical findings at this time. PCP: Shari Oro MD    No current facility-administered medications on file prior to encounter.      Current Outpatient Medications on File Prior to Encounter   Medication Sig Dispense Refill    Inulin (FIBER CHOICE PO) Take 1 tablet by mouth daily      aspirin 81 MG EC tablet Take 81 mg by mouth daily      clopidogrel (PLAVIX) 75 MG tablet Take 75 mg by mouth daily      donepezil (ARICEPT) 10 MG tablet Take 10 mg by mouth      empagliflozin (JARDIANCE) 25 MG tablet Take 25 mg by mouth daily      ezetimibe-simvastatin (VYTORIN) 10-40 MG per tablet Take 1 tablet by mouth      glimepiride (AMARYL) 4 MG tablet Take 4 mg by mouth      metFORMIN (GLUCOPHAGE) 500 MG tablet Take 500 mg by mouth daily (with breakfast)      metoprolol tartrate (LOPRESSOR) 25 MG tablet Take 12.5 mg by mouth 2 times daily             Past History     Past Medical History:  Past Medical History:   Diagnosis Date    Carotid artery stenosis     Bilateral Carotid Artery Stenosis    Dementia (HCC)     Diabetes (HCC)     Glaucoma     High cholesterol     Hypertension     S/P CABG (coronary artery bypass graft)     CAD S/P CABG    S/P TAVR (transcatheter aortic valve replacement)        Past Surgical History:  Past Surgical History:   Procedure Laterality Date    PA UNLISTED PROCEDURE CARDIAC SURGERY      bypass       Family History:  Family History   Problem Relation Age of Onset    Diabetes Other        Social History:  Social History     Tobacco Use    Smoking status: Never    Smokeless tobacco: Never   Substance Use Topics    Alcohol use: No    Drug use: No       Allergies:  No Known Allergies      Review of Systems   Review of Systems   Constitutional:  Negative for appetite change, fatigue and fever. Respiratory:  Negative for cough, chest tightness and shortness of breath. Cardiovascular:  Negative for chest pain, palpitations and leg swelling. Gastrointestinal:  Negative for abdominal pain, diarrhea, nausea and vomiting. Genitourinary:  Negative for difficulty urinating, dysuria, flank pain and frequency. Skin:  Negative for rash. Neurological:  Negative for dizziness, facial asymmetry, weakness, light-headedness, numbness and headaches. Hematological:  Negative for adenopathy. Does not bruise/bleed easily. Psychiatric/Behavioral:  Positive for agitation and confusion. Negative for behavioral problems and self-injury. Physical Exam   Physical Exam  Vitals and nursing note reviewed. Constitutional:       General: He is not in acute distress. Appearance: Normal appearance. He is not ill-appearing, toxic-appearing or diaphoretic. HENT:      Head: Normocephalic and atraumatic. Cardiovascular:      Rate and Rhythm: Normal rate and regular rhythm. Pulmonary:      Effort: Pulmonary effort is normal.      Breath sounds: Normal breath sounds.    Musculoskeletal: General: Normal range of motion. Cervical back: Normal range of motion and neck supple. Skin:     General: Skin is warm. Findings: No rash. Neurological:      General: No focal deficit present. Mental Status: Mental status is at baseline. He is disoriented and confused. Cranial Nerves: No cranial nerve deficit. Sensory: No sensory deficit. Motor: No weakness. Comments: Patient not oriented to place or time. Able to recognize objects. Able to read some words, but not fluently. Obeys verbal 3 step command. Family states this is baseline. Diagnostic Study Results     Labs -     No results found for this or any previous visit (from the past 12 hour(s)). Radiologic Studies -   No orders to display         Medical Decision Making   I am the first provider for this patient. I reviewed the vital signs, available nursing notes, past medical history, past surgical history, family history and social history. Vital Signs-Reviewed the patient's vital signs. Vitals:    02/24/23 1150   BP: (!) 158/74   Pulse: 64   Resp: 16   Temp: 97.5 °F (36.4 °C)   SpO2: 96%           Records Reviewed: previous lab data    Provider Notes (Medical Decision Making):   Nani Green, [de-identified] y.o. male PMHx significant for demenita, T2DM, CAD presents by personal vehicle to the ED with cc of nonspecific complaints. Patient is nonfocused and shifts topics during history taking. Patient has dementia. Initially patient stated he was here for spots on his skin. Initial lab work-up showed glucose of 337. Son states that primary care remove metformin from diabetes regimen yesterday due to decreasing kidney function. Creatinine was elevated mildly and this was treated with 1 L of normal saline. This also brought blood glucose down to 203. Acute alteration of mental status was a concern, but family stated that patient is at baseline.   Labs did not show an underlying infection or metabolic process other than elevated blood sugar without diabetic ketoacidosis. Urinalysis did not show infection. After 800 mL of normal saline patient was insistent that he needed to leave the department, patient left with family after repeat blood glucose showed 203. ED Course:   ED Course as of 02/25/23 1903 Fri Feb 24, 2023   1318 Not in DKA based on labs, son states BGL in mid 200s is normal AM reading.  [AS]   1319 Stopped metformin yesterday after advised to do so at medical visit [AS]      ED Course User Index  [AS] Natividad Lucas PA-C       1. Hyperglycemia    2. Dementia, unspecified dementia severity, unspecified dementia type, unspecified whether behavioral, psychotic, or mood disturbance or anxiety ([facility] [address]           Medication List        ASK your doctor about these medications      aspirin 81 MG EC tablet     clopidogrel 75 MG tablet  Commonly known as: PLAVIX     donepezil 10 MG tablet  Commonly known as: ARICEPT     empagliflozin 25 MG tablet  Commonly known as: JARDIANCE     ezetimibe-simvastatin 10-40 MG per tablet  Commonly known as: VYTORIN     FIBER CHOICE PO     glimepiride 4 MG tablet  Commonly known as: AMARYL     metFORMIN 500 MG tablet  Commonly known as: GLUCOPHAGE     metoprolol tartrate 25 MG tablet  Commonly known as: LOPRESSOR              No follow-ups on file.          Venessa Bramblia PA-C  02/24/23 [facility] 106 SHANNON Lucas  02/25/23 175 [facility] SHANNON Lucas  02/25/23 1904

## 2023-02-25 ASSESSMENT — ENCOUNTER SYMPTOMS
DIARRHEA: 0
SHORTNESS OF BREATH: 0
COUGH: 0
NAUSEA: 0
ABDOMINAL PAIN: 0
VOMITING: 0
CHEST TIGHTNESS: 0

## 2023-02-26 LAB
BACTERIA SPEC CULT: ABNORMAL
CC UR VC: ABNORMAL
SERVICE CMNT-IMP: ABNORMAL

## 2023-02-27 LAB
BACTERIA SPEC CULT: ABNORMAL
CC UR VC: ABNORMAL
SERVICE CMNT-IMP: ABNORMAL

## 2023-03-01 ENCOUNTER — TELEPHONE (OUTPATIENT)
Facility: HOSPITAL | Age: 78
End: 2023-03-01

## 2023-03-01 RX ORDER — NITROFURANTOIN 25; 75 MG/1; MG/1
100 CAPSULE ORAL 2 TIMES DAILY
Qty: 14 CAPSULE | Refills: 0 | Status: SHIPPED | OUTPATIENT
Start: 2023-03-01 | End: 2023-03-08

## 2023-03-13 ENCOUNTER — HOSPITAL ENCOUNTER (EMERGENCY)
Facility: HOSPITAL | Age: 78
Discharge: HOME | End: 2023-03-13
Attending: STUDENT IN AN ORGANIZED HEALTH CARE EDUCATION/TRAINING PROGRAM
Payer: MEDICARE

## 2023-03-13 ENCOUNTER — APPOINTMENT (OUTPATIENT)
Facility: HOSPITAL | Age: 78
End: 2023-03-13
Payer: MEDICARE

## 2023-03-13 VITALS
RESPIRATION RATE: 14 BRPM | HEART RATE: 58 BPM | DIASTOLIC BLOOD PRESSURE: 69 MMHG | SYSTOLIC BLOOD PRESSURE: 154 MMHG | OXYGEN SATURATION: 94 % | TEMPERATURE: 98.5 F

## 2023-03-13 DIAGNOSIS — R07.9 CHEST PAIN, UNSPECIFIED TYPE: Primary | ICD-10-CM

## 2023-03-13 LAB
ALBUMIN SERPL-MCNC: 3.7 G/DL (ref 3.4–5)
ALBUMIN/GLOB SERPL: 1.2 (ref 0.8–1.7)
ALP SERPL-CCNC: 85 U/L (ref 45–117)
ALT SERPL-CCNC: 42 U/L (ref 16–61)
ANION GAP SERPL CALC-SCNC: 6 MMOL/L (ref 3–18)
AST SERPL-CCNC: 35 U/L (ref 10–38)
BASOPHILS # BLD: 0.1 K/UL (ref 0–0.1)
BASOPHILS NFR BLD: 1 % (ref 0–2)
BILIRUB SERPL-MCNC: 0.5 MG/DL (ref 0.2–1)
BUN SERPL-MCNC: 29 MG/DL (ref 7–18)
BUN/CREAT SERPL: 17 (ref 12–20)
CALCIUM SERPL-MCNC: 8.9 MG/DL (ref 8.5–10.1)
CHLORIDE SERPL-SCNC: 109 MMOL/L (ref 100–111)
CO2 SERPL-SCNC: 27 MMOL/L (ref 21–32)
CREAT SERPL-MCNC: 1.75 MG/DL (ref 0.6–1.3)
DIFFERENTIAL METHOD BLD: ABNORMAL
EKG ATRIAL RATE: 55 BPM
EKG DIAGNOSIS: NORMAL
EKG P AXIS: 78 DEGREES
EKG P-R INTERVAL: 282 MS
EKG Q-T INTERVAL: 484 MS
EKG QRS DURATION: 76 MS
EKG QTC CALCULATION (BAZETT): 463 MS
EKG R AXIS: -40 DEGREES
EKG T AXIS: 11 DEGREES
EKG VENTRICULAR RATE: 55 BPM
EOSINOPHIL # BLD: 0.2 K/UL (ref 0–0.4)
EOSINOPHIL NFR BLD: 3 % (ref 0–5)
ERYTHROCYTE [DISTWIDTH] IN BLOOD BY AUTOMATED COUNT: 13.5 % (ref 11.6–14.5)
GLOBULIN SER CALC-MCNC: 3.1 G/DL (ref 2–4)
GLUCOSE SERPL-MCNC: 157 MG/DL (ref 74–99)
HCT VFR BLD AUTO: 40.1 % (ref 36–48)
HGB BLD-MCNC: 13 G/DL (ref 13–16)
IMM GRANULOCYTES # BLD AUTO: 0 K/UL (ref 0–0.04)
IMM GRANULOCYTES NFR BLD AUTO: 0 % (ref 0–0.5)
LYMPHOCYTES # BLD: 1.5 K/UL (ref 0.9–3.6)
LYMPHOCYTES NFR BLD: 27 % (ref 21–52)
MCH RBC QN AUTO: 28.5 PG (ref 24–34)
MCHC RBC AUTO-ENTMCNC: 32.4 G/DL (ref 31–37)
MCV RBC AUTO: 87.9 FL (ref 78–100)
MONOCYTES # BLD: 0.6 K/UL (ref 0.05–1.2)
MONOCYTES NFR BLD: 11 % (ref 3–10)
NEUTS SEG # BLD: 3.3 K/UL (ref 1.8–8)
NEUTS SEG NFR BLD: 58 % (ref 40–73)
NRBC # BLD: 0 K/UL (ref 0–0.01)
NRBC BLD-RTO: 0 PER 100 WBC
PLATELET # BLD AUTO: 141 K/UL (ref 135–420)
PMV BLD AUTO: 10.1 FL (ref 9.2–11.8)
POTASSIUM SERPL-SCNC: 4.1 MMOL/L (ref 3.5–5.5)
PROT SERPL-MCNC: 6.8 G/DL (ref 6.4–8.2)
RBC # BLD AUTO: 4.56 M/UL (ref 4.35–5.65)
SODIUM SERPL-SCNC: 142 MMOL/L (ref 136–145)
TROPONIN I SERPL HS-MCNC: 11 NG/L (ref 0–78)
WBC # BLD AUTO: 5.7 K/UL (ref 4.6–13.2)

## 2023-03-13 PROCEDURE — 85025 COMPLETE CBC W/AUTO DIFF WBC: CPT

## 2023-03-13 PROCEDURE — 99285 EMERGENCY DEPT VISIT HI MDM: CPT

## 2023-03-13 PROCEDURE — 93005 ELECTROCARDIOGRAM TRACING: CPT | Performed by: STUDENT IN AN ORGANIZED HEALTH CARE EDUCATION/TRAINING PROGRAM

## 2023-03-13 PROCEDURE — 84484 ASSAY OF TROPONIN QUANT: CPT

## 2023-03-13 PROCEDURE — 80053 COMPREHEN METABOLIC PANEL: CPT

## 2023-03-13 PROCEDURE — 93010 ELECTROCARDIOGRAM REPORT: CPT | Performed by: INTERNAL MEDICINE

## 2023-03-13 PROCEDURE — 71045 X-RAY EXAM CHEST 1 VIEW: CPT

## 2023-03-13 ASSESSMENT — PAIN - FUNCTIONAL ASSESSMENT: PAIN_FUNCTIONAL_ASSESSMENT: NONE - DENIES PAIN

## 2023-03-13 NOTE — ED NOTES
Attempted to call pt's son for transport home. No answer.         Roberto [place] RN  03/13/23 3011

## 2023-03-13 NOTE — ED NOTES
EMERGENCY DEPARTMENT HISTORY AND PHYSICAL EXAM      Date: 3/13/2023  Patient Name: Val Carpenter    History of Presenting Illness     Chief Complaint   Patient presents with    Chest Pain       49-year-old male with history of dementia, diabetes, hypertension, hypercholesterolemia, status post CABG, status post TAVR presenting to the emergency department via EMS for evaluation of chest pain at home. Patient is a poor historian given his dementia. Patient's wife at bedside states that he was complaining of chest pain prior to arrival and asked to be brought to the hospital.  However in route, the patient's chest pain had resolved. He has no complaints at this time. The history is provided by the patient and the spouse. PCP: Mike Haynes MD    No current facility-administered medications for this encounter.      Current Outpatient Medications   Medication Sig Dispense Refill    Inulin (FIBER CHOICE PO) Take 1 tablet by mouth daily      aspirin 81 MG EC tablet Take 81 mg by mouth daily      clopidogrel (PLAVIX) 75 MG tablet Take 75 mg by mouth daily      donepezil (ARICEPT) 10 MG tablet Take 10 mg by mouth      empagliflozin (JARDIANCE) 25 MG tablet Take 25 mg by mouth daily      ezetimibe-simvastatin (VYTORIN) 10-40 MG per tablet Take 1 tablet by mouth      glimepiride (AMARYL) 4 MG tablet Take 4 mg by mouth      metFORMIN (GLUCOPHAGE) 500 MG tablet Take 500 mg by mouth daily (with breakfast)      metoprolol tartrate (LOPRESSOR) 25 MG tablet Take 12.5 mg by mouth 2 times daily         Past History     Past Medical History:  Past Medical History:   Diagnosis Date    Carotid artery stenosis     Bilateral Carotid Artery Stenosis    Dementia (HCC)     Diabetes (HCC)     Glaucoma     High cholesterol     Hypertension     S/P CABG (coronary artery bypass graft)     CAD S/P CABG    S/P TAVR (transcatheter aortic valve replacement)        Past Surgical History:  Past Surgical History:   Procedure Laterality Date    MS UNLISTED PROCEDURE CARDIAC SURGERY      bypass       Family History:  Family History   Problem Relation Age of Onset    Diabetes Other        Social History:  Social History     Tobacco Use    Smoking status: Never    Smokeless tobacco: Never   Substance Use Topics    Alcohol use: No    Drug use: No       Allergies:  No Known Allergies      Review of Systems       Review of Systems   Unable to perform ROS: Dementia       Physical Exam   BP (!) 154/69   Pulse 58   Temp 98.5 °F (36.9 °C) (Oral)   Resp 14   SpO2 94%       Physical Exam  Constitutional:       General: He is not in acute distress. Appearance: He is not toxic-appearing. HENT:      Head: Normocephalic and atraumatic. Mouth/Throat:      Mouth: Mucous membranes are moist.   Eyes:      Extraocular Movements: Extraocular movements intact. Pupils: Pupils are equal, round, and reactive to light. Cardiovascular:      Rate and Rhythm: Normal rate and regular rhythm. Pulses: Normal pulses. Heart sounds: Normal heart sounds. Pulmonary:      Effort: Pulmonary effort is normal.      Breath sounds: Normal breath sounds. Abdominal:      General: Abdomen is flat. Palpations: Abdomen is soft. Tenderness: There is no abdominal tenderness. Musculoskeletal:         General: No swelling or deformity. Normal range of motion. Cervical back: Normal range of motion and neck supple. Skin:     General: Skin is warm and dry. Capillary Refill: Capillary refill takes less than 2 seconds. Neurological:      General: No focal deficit present. Mental Status: He is alert and oriented to person, place, and time.    Psychiatric:         Mood and Affect: Mood normal.         Diagnostic Study Results     Labs -  Recent Results (from the past 12 hour(s))   CBC with Auto Differential    Collection Time: 03/13/23  2:32 AM   Result Value Ref Range    WBC 5.7 4.6 - 13.2 K/uL    RBC 4.56 4.35 - 5.65 M/uL Hemoglobin 13.0 13.0 - 16.0 g/dL    Hematocrit 40.1 36.0 - 48.0 %    MCV 87.9 78.0 - 100.0 FL    MCH 28.5 24.0 - 34.0 PG    MCHC 32.4 31.0 - 37.0 g/dL    RDW 13.5 11.6 - 14.5 %    Platelets 952 397 - 490 K/uL    MPV 10.1 9.2 - 11.8 FL    Nucleated RBCs 0.0 0  WBC    nRBC 0.00 0.00 - 0.01 K/uL    Seg Neutrophils 58 40 - 73 %    Lymphocytes 27 21 - 52 %    Monocytes 11 (H) 3 - 10 %    Eosinophils % 3 0 - 5 %    Basophils 1 0 - 2 %    Immature Granulocytes 0 0.0 - 0.5 %    Segs Absolute 3.3 1.8 - 8.0 K/UL    Absolute Lymph # 1.5 0.9 - 3.6 K/UL    Absolute Mono # 0.6 0.05 - 1.2 K/UL    Absolute Eos # 0.2 0.0 - 0.4 K/UL    Basophils Absolute 0.1 0.0 - 0.1 K/UL    Absolute Immature Granulocyte 0.0 0.00 - 0.04 K/UL    Differential Type AUTOMATED     Comprehensive Metabolic Panel    Collection Time: 03/13/23  2:32 AM   Result Value Ref Range    Sodium 142 136 - 145 mmol/L    Potassium 4.1 3.5 - 5.5 mmol/L    Chloride 109 100 - 111 mmol/L    CO2 27 21 - 32 mmol/L    Anion Gap 6 3.0 - 18 mmol/L    Glucose 157 (H) 74 - 99 mg/dL    BUN 29 (H) 7.0 - 18 MG/DL    Creatinine 1.75 (H) 0.6 - 1.3 MG/DL    Bun/Cre Ratio 17 12 - 20      Est, Glom Filt Rate 39 (L) >60 ml/min/1.73m2    Calcium 8.9 8.5 - 10.1 MG/DL    Total Bilirubin 0.5 0.2 - 1.0 MG/DL    ALT 42 16 - 61 U/L    AST 35 10 - 38 U/L    Alk Phosphatase 85 45 - 117 U/L    Total Protein 6.8 6.4 - 8.2 g/dL    Albumin 3.7 3.4 - 5.0 g/dL    Globulin 3.1 2.0 - 4.0 g/dL    Albumin/Globulin Ratio 1.2 0.8 - 1.7     Troponin    Collection Time: 03/13/23  2:32 AM   Result Value Ref Range    Troponin, High Sensitivity 11 0 - 78 ng/L       Radiologic Studies -   XR CHEST PORTABLE    (Results Pending)           Medical Decision Making   I am the first provider for this patient. I reviewed the vital signs, available nursing notes, past medical history, past surgical history, family history and social history. Vital Signs-Reviewed the patient's vital signs.     EKG: Sinus bradycardia with first-degree AV block.  Unchanged from previous EKG.    ED Course: Progress Notes, Reevaluation, and Consults:    Provider Notes (Medical Decision Making):       MDM  Number of Diagnoses or Management Options  Chest pain, unspecified type  Diagnosis management comments: Patient is well-appearing on exam.  No acute distress.  Vital signs are stable.  Overall benign physical exam with clear lungs and normal heart sounds.  Screening lab work obtained is grossly unremarkable.  Troponin is negative.  Chest x-ray showing no acute cardiopulmonary process.  Patient and spouse reassured.  Will discharge home at this time.  Instructed on follow-up with his PCP.  Return precautions advised.  Patient verbalizes understanding and agreement with plan.  Spouse also verbalizes good understanding and agreement with plan.                 Procedures          Diagnosis     Clinical Impression:   1. Chest pain, unspecified type        Disposition: dicharged      Disclaimer: Sections of this note are dictated using utilizing voice recognition software.  Minor typographical errors may be present. If questions arise, please do not hesitate to contact me or call our department.         [place], DO  03/13/23 0356

## 2023-03-29 ENCOUNTER — OFFICE VISIT (OUTPATIENT)
Age: 78
End: 2023-03-29
Payer: MEDICARE

## 2023-03-29 VITALS
DIASTOLIC BLOOD PRESSURE: 70 MMHG | BODY MASS INDEX: 27.66 KG/M2 | SYSTOLIC BLOOD PRESSURE: 130 MMHG | WEIGHT: 162 LBS | HEIGHT: 64 IN | OXYGEN SATURATION: 95 % | HEART RATE: 60 BPM | RESPIRATION RATE: 18 BRPM

## 2023-03-29 DIAGNOSIS — Z78.9 DECREASED ACTIVITIES OF DAILY LIVING (ADL): ICD-10-CM

## 2023-03-29 DIAGNOSIS — R26.89 IMBALANCE: ICD-10-CM

## 2023-03-29 DIAGNOSIS — R41.3 MEMORY LOSS: Primary | ICD-10-CM

## 2023-03-29 DIAGNOSIS — F03.93 DEMENTIA WITH MOOD DISTURBANCE, UNSPECIFIED DEMENTIA SEVERITY, UNSPECIFIED DEMENTIA TYPE (HCC): ICD-10-CM

## 2023-03-29 DIAGNOSIS — R41.82 ALTERED MENTAL STATUS, UNSPECIFIED ALTERED MENTAL STATUS TYPE: ICD-10-CM

## 2023-03-29 DIAGNOSIS — E53.8 LOW SERUM VITAMIN B12: ICD-10-CM

## 2023-03-29 PROCEDURE — 3078F DIAST BP <80 MM HG: CPT | Performed by: NURSE PRACTITIONER

## 2023-03-29 PROCEDURE — G8417 CALC BMI ABV UP PARAM F/U: HCPCS | Performed by: NURSE PRACTITIONER

## 2023-03-29 PROCEDURE — 1036F TOBACCO NON-USER: CPT | Performed by: NURSE PRACTITIONER

## 2023-03-29 PROCEDURE — 3074F SYST BP LT 130 MM HG: CPT | Performed by: NURSE PRACTITIONER

## 2023-03-29 PROCEDURE — 1123F ACP DISCUSS/DSCN MKR DOCD: CPT | Performed by: NURSE PRACTITIONER

## 2023-03-29 PROCEDURE — G8484 FLU IMMUNIZE NO ADMIN: HCPCS | Performed by: NURSE PRACTITIONER

## 2023-03-29 PROCEDURE — G8427 DOCREV CUR MEDS BY ELIG CLIN: HCPCS | Performed by: NURSE PRACTITIONER

## 2023-03-29 PROCEDURE — 99205 OFFICE O/P NEW HI 60 MIN: CPT | Performed by: NURSE PRACTITIONER

## 2023-03-29 RX ORDER — PAROXETINE 10 MG/1
TABLET, FILM COATED ORAL
COMMUNITY
Start: 2023-03-26 | End: 2023-03-29

## 2023-03-29 RX ORDER — ROSUVASTATIN CALCIUM 10 MG/1
10 TABLET, COATED ORAL DAILY
COMMUNITY

## 2023-03-29 RX ORDER — MEMANTINE HYDROCHLORIDE 5 MG/1
10 TABLET ORAL DAILY
Qty: 60 TABLET | Refills: 5 | Status: SHIPPED | OUTPATIENT
Start: 2023-03-29

## 2023-03-29 RX ORDER — DONEPEZIL HYDROCHLORIDE 10 MG/1
10 TABLET, FILM COATED ORAL NIGHTLY
Qty: 30 TABLET | Refills: 5 | Status: SHIPPED | OUTPATIENT
Start: 2023-03-29

## 2023-03-29 RX ORDER — MEMANTINE HYDROCHLORIDE 5 MG/1
5 TABLET ORAL DAILY
COMMUNITY
End: 2023-03-29 | Stop reason: SDUPTHER

## 2023-03-29 NOTE — PATIENT INSTRUCTIONS
Patient instructions:  -MRI brain and EEG- scheduling will call you  -labs- obtain  -continue donepezil/ Aricept 10 mg daily  -memantine/ Namenda- increase from 5 mg daily to 10 mg daily  -will consider sertraline/ Zoloft for mood/ behavior  -[facility]- [phone]  -home health PT and OT  -follow up in 2-3 months

## 2023-03-29 NOTE — PROGRESS NOTES
unremarkable. Impression       No acute intracranial findings. Minimal periventricular low-attenuation, likely chronic microvascular ischemic   change. Impression/Plan:   Sacha Dupont is a [de-identified] y.o. right-handed male whose history and physical are consistent with dementia. Onset of memory concerns has been about 2 years but worsening faster over the past 6 months. He asked the same questions multiple times, does not remember where he lives or how long he has lived there, and needs assistance with IADLs such as taking medications, bill payment, etc.  He no longer drives. He does ADLs. His wife helps him at home but she may have memory problems as well. He has assistance with his son who lives locally and will be moving in with his other son. We will obtain work-up including MRI brain and EEG. He had a low vitamin B12 level in the past.  We will recheck this and check folate and MMA. He is currently on Aricept 10 mg and his son did not notice help from it. He is on Namenda 5 mg. Will increase to 10 mg once daily and keep it at that for now due to renal impairment. We will use once daily dosing for adherence. His son helps with supervision with medications in the morning. He is currently on SSRI. He has some parkinsonism signs on exam.  Provided handout on nonpharmacologic recommendations for dementia and sundowning. Had discussion with son after visit. Has had paranoia, getting worse. May consider a low dose of Seroquel 12.5 mg.  Discussed the consideration for sleep evaluation. Sleep hygiene measures. We will refer for home health PT and OT. We will obtain neuropsychological evaluation. Follow up with PCP for vascular risk factor management including diabetes which has been above goal.  Follow up in 2-3 months. Rolandochristal was seen today for memory loss. Diagnoses and all orders for this visit:    Memory loss  -     EEG;  Future  -     Vitamin B12 &

## 2023-03-30 ENCOUNTER — HOSPITAL ENCOUNTER (EMERGENCY)
Facility: HOSPITAL | Age: 78
Discharge: HOME | End: 2023-03-30
Payer: MEDICARE

## 2023-03-30 ENCOUNTER — APPOINTMENT (OUTPATIENT)
Facility: HOSPITAL | Age: 78
End: 2023-03-30
Payer: MEDICARE

## 2023-03-30 VITALS
HEART RATE: 54 BPM | OXYGEN SATURATION: 99 % | RESPIRATION RATE: 16 BRPM | DIASTOLIC BLOOD PRESSURE: 66 MMHG | SYSTOLIC BLOOD PRESSURE: 120 MMHG

## 2023-03-30 DIAGNOSIS — R07.9 CHEST PAIN, UNSPECIFIED TYPE: Primary | ICD-10-CM

## 2023-03-30 LAB
ALBUMIN SERPL-MCNC: 4.1 G/DL (ref 3.4–5)
ALBUMIN/GLOB SERPL: 1.2 (ref 0.8–1.7)
ALP SERPL-CCNC: 103 U/L (ref 45–117)
ALT SERPL-CCNC: 41 U/L (ref 16–61)
ANION GAP SERPL CALC-SCNC: 9 MMOL/L (ref 3–18)
AST SERPL-CCNC: 24 U/L (ref 10–38)
BASOPHILS # BLD: 0.1 K/UL (ref 0–0.1)
BASOPHILS NFR BLD: 1 % (ref 0–2)
BILIRUB SERPL-MCNC: 0.6 MG/DL (ref 0.2–1)
BUN SERPL-MCNC: 32 MG/DL (ref 7–18)
BUN/CREAT SERPL: 18 (ref 12–20)
CALCIUM SERPL-MCNC: 9.5 MG/DL (ref 8.5–10.1)
CHLORIDE SERPL-SCNC: 108 MMOL/L (ref 100–111)
CO2 SERPL-SCNC: 24 MMOL/L (ref 21–32)
CREAT SERPL-MCNC: 1.74 MG/DL (ref 0.6–1.3)
DIFFERENTIAL METHOD BLD: ABNORMAL
EOSINOPHIL # BLD: 0.2 K/UL (ref 0–0.4)
EOSINOPHIL NFR BLD: 2 % (ref 0–5)
ERYTHROCYTE [DISTWIDTH] IN BLOOD BY AUTOMATED COUNT: 13.9 % (ref 11.6–14.5)
GLOBULIN SER CALC-MCNC: 3.5 G/DL (ref 2–4)
GLUCOSE SERPL-MCNC: 165 MG/DL (ref 74–99)
HCT VFR BLD AUTO: 41.7 % (ref 36–48)
HGB BLD-MCNC: 13.5 G/DL (ref 13–16)
IMM GRANULOCYTES # BLD AUTO: 0 K/UL (ref 0–0.04)
IMM GRANULOCYTES NFR BLD AUTO: 0 % (ref 0–0.5)
LYMPHOCYTES # BLD: 1.1 K/UL (ref 0.9–3.6)
LYMPHOCYTES NFR BLD: 11 % (ref 21–52)
MCH RBC QN AUTO: 28.9 PG (ref 24–34)
MCHC RBC AUTO-ENTMCNC: 32.4 G/DL (ref 31–37)
MCV RBC AUTO: 89.3 FL (ref 78–100)
MONOCYTES # BLD: 0.9 K/UL (ref 0.05–1.2)
MONOCYTES NFR BLD: 8 % (ref 3–10)
NEUTS SEG # BLD: 8.3 K/UL (ref 1.8–8)
NEUTS SEG NFR BLD: 79 % (ref 40–73)
NRBC # BLD: 0 K/UL (ref 0–0.01)
NRBC BLD-RTO: 0 PER 100 WBC
PLATELET # BLD AUTO: 136 K/UL (ref 135–420)
PMV BLD AUTO: 9.9 FL (ref 9.2–11.8)
POTASSIUM SERPL-SCNC: 4 MMOL/L (ref 3.5–5.5)
PROT SERPL-MCNC: 7.6 G/DL (ref 6.4–8.2)
RBC # BLD AUTO: 4.67 M/UL (ref 4.35–5.65)
SODIUM SERPL-SCNC: 141 MMOL/L (ref 136–145)
TROPONIN I SERPL HS-MCNC: 10 NG/L (ref 0–78)
TROPONIN I SERPL HS-MCNC: 8 NG/L (ref 0–78)
WBC # BLD AUTO: 10.6 K/UL (ref 4.6–13.2)

## 2023-03-30 PROCEDURE — 71045 X-RAY EXAM CHEST 1 VIEW: CPT

## 2023-03-30 PROCEDURE — 2580000003 HC RX 258: Performed by: STUDENT IN AN ORGANIZED HEALTH CARE EDUCATION/TRAINING PROGRAM

## 2023-03-30 PROCEDURE — 96374 THER/PROPH/DIAG INJ IV PUSH: CPT

## 2023-03-30 PROCEDURE — A4216 STERILE WATER/SALINE, 10 ML: HCPCS | Performed by: STUDENT IN AN ORGANIZED HEALTH CARE EDUCATION/TRAINING PROGRAM

## 2023-03-30 PROCEDURE — 6360000002 HC RX W HCPCS: Performed by: STUDENT IN AN ORGANIZED HEALTH CARE EDUCATION/TRAINING PROGRAM

## 2023-03-30 PROCEDURE — 80053 COMPREHEN METABOLIC PANEL: CPT

## 2023-03-30 PROCEDURE — 84484 ASSAY OF TROPONIN QUANT: CPT

## 2023-03-30 PROCEDURE — 99285 EMERGENCY DEPT VISIT HI MDM: CPT

## 2023-03-30 PROCEDURE — 85025 COMPLETE CBC W/AUTO DIFF WBC: CPT

## 2023-03-30 PROCEDURE — 93005 ELECTROCARDIOGRAM TRACING: CPT | Performed by: EMERGENCY MEDICINE

## 2023-03-30 PROCEDURE — C9113 INJ PANTOPRAZOLE SODIUM, VIA: HCPCS | Performed by: STUDENT IN AN ORGANIZED HEALTH CARE EDUCATION/TRAINING PROGRAM

## 2023-03-30 RX ORDER — OMEPRAZOLE 20 MG/1
40 CAPSULE, DELAYED RELEASE ORAL DAILY
Qty: 60 CAPSULE | Refills: 0 | Status: SHIPPED | OUTPATIENT
Start: 2023-03-30 | End: 2023-04-29

## 2023-03-30 RX ADMIN — SODIUM CHLORIDE 40 MG: 9 INJECTION INTRAMUSCULAR; INTRAVENOUS; SUBCUTANEOUS at 17:59

## 2023-03-30 NOTE — ED PROVIDER NOTES
EMERGENCY DEPARTMENT HISTORY AND PHYSICAL EXAM    7:59 PM      Date: 3/30/2023  Patient Name: Milly Newman    History of Presenting Illness     Chief Complaint   Patient presents with    Chest Pain         History Provided By: Patient, wife  Location/Duration/Severity/Modifying factors   HPI  This 38-year-old male with aortic stenosis status post TAVR, CAD status post CABG 1997, bilateral carotid artery stenosis, HTN, dyslipidemia, DMT2, who presents to the ED with chest pain onset earlier today. He states the pain is to various parts to his chest, intermittent in nature. Unsure of any provoking or palliating factors. He states he had it earlier this morning but is unsure what he was doing. States he was not eating but may have just finished eating. Pain is nonradiating, nonexertional and he denies any dyspnea or palpitations. Also denies fever, chills, cough, nausea, vomiting, abdominal pain, leg swelling. Unsure if he has any history of reflux. Has not tried any medications today. States after he had the pain him and his wife came to the ED. PCP: Aaron Bonner MD    No current facility-administered medications for this encounter.      Current Outpatient Medications   Medication Sig Dispense Refill    omeprazole (PRILOSEC) 20 MG delayed release capsule Take 2 capsules by mouth Daily 60 capsule 0    MAGNESIUM PO Take 1 tablet by mouth daily      Apoaequorin (PREVAGEN) 10 MG CAPS as directed      B Complex Vitamins (VITAMIN B COMPLEX 100 IJ) as directed      Multiple Vitamins-Minerals (MULTIVITAMIN ADULT EXTRA C PO) 1 tablet      rosuvastatin (CRESTOR) 10 MG tablet Take 10 mg by mouth daily      memantine (NAMENDA) 5 MG tablet Take 2 tablets by mouth daily 60 tablet 5    donepezil (ARICEPT) 10 MG tablet Take 1 tablet by mouth nightly 30 tablet 5    Inulin (FIBER CHOICE PO) Take 1 tablet by mouth daily      aspirin 81 MG EC tablet Take 81 mg by mouth daily      empagliflozin (JARDIANCE) 25 MG

## 2023-03-30 NOTE — ED TRIAGE NOTES
Pt. Arrives to the ED with wife endorsing chest pain. Pt. Is pleasantly confused at this time. No acute distress noted.

## 2023-03-31 ENCOUNTER — HOME CARE VISIT (OUTPATIENT)
Age: 78
End: 2023-03-31

## 2023-03-31 LAB
EKG ATRIAL RATE: 61 BPM
EKG DIAGNOSIS: NORMAL
EKG P AXIS: 51 DEGREES
EKG P-R INTERVAL: 296 MS
EKG Q-T INTERVAL: 460 MS
EKG QRS DURATION: 80 MS
EKG QTC CALCULATION (BAZETT): 463 MS
EKG R AXIS: -40 DEGREES
EKG T AXIS: 50 DEGREES
EKG VENTRICULAR RATE: 61 BPM

## 2023-03-31 PROCEDURE — 93010 ELECTROCARDIOGRAM REPORT: CPT | Performed by: INTERNAL MEDICINE

## 2023-04-02 ENCOUNTER — HOSPITAL ENCOUNTER (EMERGENCY)
Facility: HOSPITAL | Age: 78
Discharge: HOME | End: 2023-04-02
Attending: EMERGENCY MEDICINE
Payer: MEDICARE

## 2023-04-02 ENCOUNTER — APPOINTMENT (OUTPATIENT)
Facility: HOSPITAL | Age: 78
End: 2023-04-02
Payer: MEDICARE

## 2023-04-02 VITALS
TEMPERATURE: 97 F | OXYGEN SATURATION: 96 % | SYSTOLIC BLOOD PRESSURE: 143 MMHG | DIASTOLIC BLOOD PRESSURE: 70 MMHG | HEART RATE: 55 BPM | RESPIRATION RATE: 16 BRPM

## 2023-04-02 DIAGNOSIS — R41.9 COGNITIVE COMPLAINTS WITH NORMAL EXAM: Primary | ICD-10-CM

## 2023-04-02 LAB
ALBUMIN SERPL-MCNC: 3.8 G/DL (ref 3.4–5)
ALBUMIN/GLOB SERPL: 1.2 (ref 0.8–1.7)
ALP SERPL-CCNC: 95 U/L (ref 45–117)
ALT SERPL-CCNC: 35 U/L (ref 16–61)
ANION GAP SERPL CALC-SCNC: 9 MMOL/L (ref 3–18)
AST SERPL-CCNC: 23 U/L (ref 10–38)
BASOPHILS # BLD: 0 K/UL (ref 0–0.1)
BASOPHILS NFR BLD: 1 % (ref 0–2)
BILIRUB SERPL-MCNC: 0.6 MG/DL (ref 0.2–1)
BUN SERPL-MCNC: 33 MG/DL (ref 7–18)
BUN/CREAT SERPL: 21 (ref 12–20)
CALCIUM SERPL-MCNC: 9.2 MG/DL (ref 8.5–10.1)
CHLORIDE SERPL-SCNC: 109 MMOL/L (ref 100–111)
CO2 SERPL-SCNC: 24 MMOL/L (ref 21–32)
CREAT SERPL-MCNC: 1.59 MG/DL (ref 0.6–1.3)
DIFFERENTIAL METHOD BLD: ABNORMAL
EKG ATRIAL RATE: 52 BPM
EKG DIAGNOSIS: NORMAL
EKG P AXIS: 64 DEGREES
EKG P-R INTERVAL: 294 MS
EKG Q-T INTERVAL: 488 MS
EKG QRS DURATION: 84 MS
EKG QTC CALCULATION (BAZETT): 453 MS
EKG R AXIS: -32 DEGREES
EKG T AXIS: 31 DEGREES
EKG VENTRICULAR RATE: 52 BPM
EOSINOPHIL # BLD: 0.2 K/UL (ref 0–0.4)
EOSINOPHIL NFR BLD: 4 % (ref 0–5)
ERYTHROCYTE [DISTWIDTH] IN BLOOD BY AUTOMATED COUNT: 13.8 % (ref 11.6–14.5)
GLOBULIN SER CALC-MCNC: 3.3 G/DL (ref 2–4)
GLUCOSE SERPL-MCNC: 205 MG/DL (ref 74–99)
HCT VFR BLD AUTO: 38.5 % (ref 36–48)
HGB BLD-MCNC: 12.8 G/DL (ref 13–16)
IMM GRANULOCYTES # BLD AUTO: 0 K/UL (ref 0–0.04)
IMM GRANULOCYTES NFR BLD AUTO: 0 % (ref 0–0.5)
LYMPHOCYTES # BLD: 0.9 K/UL (ref 0.9–3.6)
LYMPHOCYTES NFR BLD: 18 % (ref 21–52)
MAGNESIUM SERPL-MCNC: 2.3 MG/DL (ref 1.6–2.6)
MCH RBC QN AUTO: 29 PG (ref 24–34)
MCHC RBC AUTO-ENTMCNC: 33.2 G/DL (ref 31–37)
MCV RBC AUTO: 87.1 FL (ref 78–100)
MONOCYTES # BLD: 0.4 K/UL (ref 0.05–1.2)
MONOCYTES NFR BLD: 8 % (ref 3–10)
NEUTS SEG # BLD: 3.5 K/UL (ref 1.8–8)
NEUTS SEG NFR BLD: 70 % (ref 40–73)
NRBC # BLD: 0 K/UL (ref 0–0.01)
NRBC BLD-RTO: 0 PER 100 WBC
PLATELET # BLD AUTO: 130 K/UL (ref 135–420)
PMV BLD AUTO: 10.2 FL (ref 9.2–11.8)
POTASSIUM SERPL-SCNC: 3.8 MMOL/L (ref 3.5–5.5)
PROT SERPL-MCNC: 7.1 G/DL (ref 6.4–8.2)
RBC # BLD AUTO: 4.42 M/UL (ref 4.35–5.65)
SODIUM SERPL-SCNC: 142 MMOL/L (ref 136–145)
TROPONIN I SERPL HS-MCNC: 11 NG/L (ref 0–78)
TROPONIN I SERPL HS-MCNC: 11 NG/L (ref 0–78)
WBC # BLD AUTO: 5.1 K/UL (ref 4.6–13.2)

## 2023-04-02 PROCEDURE — 99285 EMERGENCY DEPT VISIT HI MDM: CPT

## 2023-04-02 PROCEDURE — 93005 ELECTROCARDIOGRAM TRACING: CPT | Performed by: EMERGENCY MEDICINE

## 2023-04-02 PROCEDURE — 71046 X-RAY EXAM CHEST 2 VIEWS: CPT

## 2023-04-02 PROCEDURE — 80053 COMPREHEN METABOLIC PANEL: CPT

## 2023-04-02 PROCEDURE — 93010 ELECTROCARDIOGRAM REPORT: CPT | Performed by: INTERNAL MEDICINE

## 2023-04-02 PROCEDURE — 84484 ASSAY OF TROPONIN QUANT: CPT

## 2023-04-02 PROCEDURE — 94760 N-INVAS EAR/PLS OXIMETRY 1: CPT

## 2023-04-02 PROCEDURE — 85025 COMPLETE CBC W/AUTO DIFF WBC: CPT

## 2023-04-02 PROCEDURE — 83735 ASSAY OF MAGNESIUM: CPT

## 2023-04-02 RX ORDER — MAGNESIUM HYDROXIDE/ALUMINUM HYDROXICE/SIMETHICONE 120; 1200; 1200 MG/30ML; MG/30ML; MG/30ML
30 SUSPENSION ORAL
Status: DISCONTINUED | OUTPATIENT
Start: 2023-04-02 | End: 2023-04-02 | Stop reason: HOSPADM

## 2023-04-02 NOTE — ED PROVIDER NOTES
% 4 0 - 5 %    Basophils 1 0 - 2 %    Immature Granulocytes 0 0.0 - 0.5 %    Segs Absolute 3.5 1.8 - 8.0 K/UL    Absolute Lymph # 0.9 0.9 - 3.6 K/UL    Absolute Mono # 0.4 0.05 - 1.2 K/UL    Absolute Eos # 0.2 0.0 - 0.4 K/UL    Basophils Absolute 0.0 0.0 - 0.1 K/UL    Absolute Immature Granulocyte 0.0 0.00 - 0.04 K/UL    Differential Type AUTOMATED     Comprehensive Metabolic Panel    Collection Time: 04/02/23  1:45 PM   Result Value Ref Range    Sodium 142 136 - 145 mmol/L    Potassium 3.8 3.5 - 5.5 mmol/L    Chloride 109 100 - 111 mmol/L    CO2 24 21 - 32 mmol/L    Anion Gap 9 3.0 - 18 mmol/L    Glucose 205 (H) 74 - 99 mg/dL    BUN 33 (H) 7.0 - 18 MG/DL    Creatinine 1.59 (H) 0.6 - 1.3 MG/DL    Bun/Cre Ratio 21 (H) 12 - 20      Est, Glom Filt Rate 44 (L) >60 ml/min/1.73m2    Calcium 9.2 8.5 - 10.1 MG/DL    Total Bilirubin 0.6 0.2 - 1.0 MG/DL    ALT 35 16 - 61 U/L    AST 23 10 - 38 U/L    Alk Phosphatase 95 45 - 117 U/L    Total Protein 7.1 6.4 - 8.2 g/dL    Albumin 3.8 3.4 - 5.0 g/dL    Globulin 3.3 2.0 - 4.0 g/dL    Albumin/Globulin Ratio 1.2 0.8 - 1.7     Troponin    Collection Time: 04/02/23  1:45 PM   Result Value Ref Range    Troponin, High Sensitivity 11 0 - 78 ng/L   Magnesium    Collection Time: 04/02/23  1:45 PM   Result Value Ref Range    Magnesium 2.3 1.6 - 2.6 mg/dL   Troponin    Collection Time: 04/02/23  3:52 PM   Result Value Ref Range    Troponin, High Sensitivity 11 0 - 78 ng/L       Imaging:    XR CHEST (2 VW)   Final Result      No clearly acute findings. Calcified pleural plaques again demonstrated. Postsurgical changes as above. EKG as interpreted by me shows sinus bradycardia with first-degree AV block with a rate of 52 bpm.  MI interval prolonged at 294 ms. QRS and QTc intervals within normal limits. No ST segment elevations or depressions to suggest acute ischemia or infarction.     ED Course/Medical Decision Making   [de-identified] y.o. male who presents to the ED with a reported episode of

## 2023-04-02 NOTE — DISCHARGE INSTRUCTIONS
Follow-up with your primary care doctor as soon as you are able, preferably within the next 2 to 4 days. You may take Maalox (over-the-counter medication for stomach upset) should you have additional upset stomach. Return immediately to the ER for episodes of passing out, chest pain, shortness of breath, inability to keep fluids down due to nausea and vomiting, or any other concerns.

## 2023-04-03 ENCOUNTER — HOSPITAL ENCOUNTER (EMERGENCY)
Facility: HOSPITAL | Age: 78
Discharge: HOME | End: 2023-04-03
Attending: EMERGENCY MEDICINE
Payer: MEDICARE

## 2023-04-03 ENCOUNTER — APPOINTMENT (OUTPATIENT)
Facility: HOSPITAL | Age: 78
End: 2023-04-03
Payer: MEDICARE

## 2023-04-03 ENCOUNTER — HOME CARE VISIT (OUTPATIENT)
Age: 78
End: 2023-04-03

## 2023-04-03 VITALS
TEMPERATURE: 97.3 F | DIASTOLIC BLOOD PRESSURE: 65 MMHG | BODY MASS INDEX: 27.83 KG/M2 | SYSTOLIC BLOOD PRESSURE: 140 MMHG | OXYGEN SATURATION: 100 % | RESPIRATION RATE: 13 BRPM | HEIGHT: 64 IN | WEIGHT: 163 LBS | HEART RATE: 58 BPM

## 2023-04-03 DIAGNOSIS — R53.81 MALAISE AND FATIGUE: ICD-10-CM

## 2023-04-03 DIAGNOSIS — E86.0 DEHYDRATION: Primary | ICD-10-CM

## 2023-04-03 DIAGNOSIS — R53.83 MALAISE AND FATIGUE: ICD-10-CM

## 2023-04-03 LAB
ALBUMIN SERPL-MCNC: 3.9 G/DL (ref 3.4–5)
ALBUMIN/GLOB SERPL: 1.2 (ref 0.8–1.7)
ALP SERPL-CCNC: 78 U/L (ref 45–117)
ALT SERPL-CCNC: 37 U/L (ref 16–61)
ANION GAP SERPL CALC-SCNC: 4 MMOL/L (ref 3–18)
APPEARANCE UR: CLEAR
AST SERPL-CCNC: 29 U/L (ref 10–38)
BASOPHILS # BLD: 0.1 K/UL (ref 0–0.1)
BASOPHILS NFR BLD: 1 % (ref 0–2)
BILIRUB SERPL-MCNC: 0.6 MG/DL (ref 0.2–1)
BILIRUB UR QL: NEGATIVE
BUN SERPL-MCNC: 28 MG/DL (ref 7–18)
BUN/CREAT SERPL: 17 (ref 12–20)
CALCIUM SERPL-MCNC: 8.8 MG/DL (ref 8.5–10.1)
CHLORIDE SERPL-SCNC: 108 MMOL/L (ref 100–111)
CO2 SERPL-SCNC: 26 MMOL/L (ref 21–32)
COLOR UR: YELLOW
CREAT SERPL-MCNC: 1.66 MG/DL (ref 0.6–1.3)
DIFFERENTIAL METHOD BLD: ABNORMAL
EKG ATRIAL RATE: 54 BPM
EKG DIAGNOSIS: NORMAL
EKG Q-T INTERVAL: 520 MS
EKG QRS DURATION: 78 MS
EKG QTC CALCULATION (BAZETT): 497 MS
EKG R AXIS: -25 DEGREES
EKG T AXIS: 5 DEGREES
EKG VENTRICULAR RATE: 55 BPM
EOSINOPHIL # BLD: 0.2 K/UL (ref 0–0.4)
EOSINOPHIL NFR BLD: 3 % (ref 0–5)
ERYTHROCYTE [DISTWIDTH] IN BLOOD BY AUTOMATED COUNT: 13.9 % (ref 11.6–14.5)
FLUAV AG NPH QL IA: NEGATIVE
FLUBV AG NOSE QL IA: NEGATIVE
GLOBULIN SER CALC-MCNC: 3.3 G/DL (ref 2–4)
GLUCOSE BLD STRIP.AUTO-MCNC: 162 MG/DL (ref 70–110)
GLUCOSE SERPL-MCNC: 174 MG/DL (ref 74–99)
GLUCOSE UR STRIP.AUTO-MCNC: >1000 MG/DL
HCT VFR BLD AUTO: 39.7 % (ref 36–48)
HGB BLD-MCNC: 13.1 G/DL (ref 13–16)
HGB UR QL STRIP: NEGATIVE
IMM GRANULOCYTES # BLD AUTO: 0 K/UL (ref 0–0.04)
IMM GRANULOCYTES NFR BLD AUTO: 0 % (ref 0–0.5)
KETONES UR QL STRIP.AUTO: NEGATIVE MG/DL
LACTATE BLD-SCNC: 0.9 MMOL/L (ref 0.4–2)
LEUKOCYTE ESTERASE UR QL STRIP.AUTO: NEGATIVE
LYMPHOCYTES # BLD: 1 K/UL (ref 0.9–3.6)
LYMPHOCYTES NFR BLD: 17 % (ref 21–52)
MAGNESIUM SERPL-MCNC: 2.3 MG/DL (ref 1.6–2.6)
MCH RBC QN AUTO: 29.2 PG (ref 24–34)
MCHC RBC AUTO-ENTMCNC: 33 G/DL (ref 31–37)
MCV RBC AUTO: 88.4 FL (ref 78–100)
MONOCYTES # BLD: 0.5 K/UL (ref 0.05–1.2)
MONOCYTES NFR BLD: 8 % (ref 3–10)
NEUTS SEG # BLD: 4 K/UL (ref 1.8–8)
NEUTS SEG NFR BLD: 71 % (ref 40–73)
NITRITE UR QL STRIP.AUTO: NEGATIVE
NRBC # BLD: 0 K/UL (ref 0–0.01)
NRBC BLD-RTO: 0 PER 100 WBC
NT PRO BNP: 134 PG/ML (ref 0–1800)
PH UR STRIP: 5.5 (ref 5–8)
PLATELET # BLD AUTO: 124 K/UL (ref 135–420)
PLATELET COMMENT: ABNORMAL
PMV BLD AUTO: 10.3 FL (ref 9.2–11.8)
POTASSIUM SERPL-SCNC: 4 MMOL/L (ref 3.5–5.5)
PROT SERPL-MCNC: 7.2 G/DL (ref 6.4–8.2)
PROT UR STRIP-MCNC: NEGATIVE MG/DL
RBC # BLD AUTO: 4.49 M/UL (ref 4.35–5.65)
RBC MORPH BLD: ABNORMAL
SARS-COV-2 RDRP RESP QL NAA+PROBE: NOT DETECTED
SODIUM SERPL-SCNC: 138 MMOL/L (ref 136–145)
SOURCE: NORMAL
SP GR UR REFRACTOMETRY: 1.02 (ref 1–1.03)
TROPONIN I SERPL HS-MCNC: 12 NG/L (ref 0–78)
UROBILINOGEN UR QL STRIP.AUTO: 0.2 EU/DL (ref 0.2–1)
WBC # BLD AUTO: 5.8 K/UL (ref 4.6–13.2)

## 2023-04-03 PROCEDURE — 70450 CT HEAD/BRAIN W/O DYE: CPT

## 2023-04-03 PROCEDURE — 84484 ASSAY OF TROPONIN QUANT: CPT

## 2023-04-03 PROCEDURE — 83735 ASSAY OF MAGNESIUM: CPT

## 2023-04-03 PROCEDURE — 96360 HYDRATION IV INFUSION INIT: CPT

## 2023-04-03 PROCEDURE — 87040 BLOOD CULTURE FOR BACTERIA: CPT

## 2023-04-03 PROCEDURE — 87804 INFLUENZA ASSAY W/OPTIC: CPT

## 2023-04-03 PROCEDURE — 2580000003 HC RX 258: Performed by: EMERGENCY MEDICINE

## 2023-04-03 PROCEDURE — 83605 ASSAY OF LACTIC ACID: CPT

## 2023-04-03 PROCEDURE — 81003 URINALYSIS AUTO W/O SCOPE: CPT

## 2023-04-03 PROCEDURE — 83880 ASSAY OF NATRIURETIC PEPTIDE: CPT

## 2023-04-03 PROCEDURE — 85025 COMPLETE CBC W/AUTO DIFF WBC: CPT

## 2023-04-03 PROCEDURE — 80053 COMPREHEN METABOLIC PANEL: CPT

## 2023-04-03 PROCEDURE — 87635 SARS-COV-2 COVID-19 AMP PRB: CPT

## 2023-04-03 PROCEDURE — 82962 GLUCOSE BLOOD TEST: CPT

## 2023-04-03 PROCEDURE — 93005 ELECTROCARDIOGRAM TRACING: CPT | Performed by: EMERGENCY MEDICINE

## 2023-04-03 PROCEDURE — 99285 EMERGENCY DEPT VISIT HI MDM: CPT

## 2023-04-03 PROCEDURE — 93010 ELECTROCARDIOGRAM REPORT: CPT | Performed by: INTERNAL MEDICINE

## 2023-04-03 PROCEDURE — 71045 X-RAY EXAM CHEST 1 VIEW: CPT

## 2023-04-03 RX ORDER — 0.9 % SODIUM CHLORIDE 0.9 %
1000 INTRAVENOUS SOLUTION INTRAVENOUS ONCE
Status: COMPLETED | OUTPATIENT
Start: 2023-04-03 | End: 2023-04-03

## 2023-04-03 RX ADMIN — SODIUM CHLORIDE 1000 ML: 9 INJECTION, SOLUTION INTRAVENOUS at 14:44

## 2023-04-03 ASSESSMENT — ENCOUNTER SYMPTOMS
EYES NEGATIVE: 1
CHEST TIGHTNESS: 0
ABDOMINAL PAIN: 0

## 2023-04-03 ASSESSMENT — PAIN - FUNCTIONAL ASSESSMENT: PAIN_FUNCTIONAL_ASSESSMENT: NONE - DENIES PAIN

## 2023-04-03 NOTE — ED PROVIDER NOTES
patient size (including appropriate matching for site specific   examinations) or use of iterative reconstruction technique. XR CHEST 1 VIEW   Final Result   Bilateral calcified pleural plaques. Low lung volumes without new superimposed acute findings. Medical Decision Making   I am the first provider for this patient. I reviewed the vital signs, available nursing notes, past medical history, past surgical history, family history and social history. Vital Signs-Reviewed the patient's vital signs. EKG: Atrial fibrillation with a rate of 55, prolonged QTc, no STEMI, interpreted by me    Records Reviewed: Old medical records. Previous electrocardiograms. Nursing notes. Previous radiology studies. (Time of Review: 2:49 PM)    ED Course: Progress Notes, Reevaluation, and Consults:    Provider Notes (Medical Decision Making):   MDM  Number of Diagnoses or Management Options  Dehydration  Malaise and fatigue  Diagnosis management comments: Patient is an 57-year-old male with a history of cardiac disease, history of CABG, transaortic valve replacement, dementia, diabetes, hypertension, the presents emergency department with tremor, overall fatigue, and not feeling well according to patient and family. The patient was seen in the emergency department yesterday and reassuring work-up for chest pain. Today has borderline heart rate in the 50s but is on a beta-blocker and looking back at previous records the patient has been seen by neurology and had his Namenda increased from 5 mg to 10 mg. There is a plan to have the patient get an MRI of the brain and potentially see neuropsych based on some behavior changes. The patient in the emergency department does follow commands, answers questions, but has his eyes closed, is slow to respond. Patient also has a coarse tremor. The patient's labs appear unchanged and appears that has been having to go to the hospital more frequently recently.   We

## 2023-04-03 NOTE — ED NOTES
Patient resting comfortably on the stretcher with family at the bedside. Patient has no signs or symptoms of acute distress at this time. Patient has no concerns or questions about their treatment plan.        Harrison Fernández RN  04/03/23 [id number]

## 2023-04-03 NOTE — ED TRIAGE NOTES
Patient presents to ER with c/o feeling dizzy and rt hand tremors, no tremors noted at this time. Patient went to [facility] ER yest. 4/3/23 for \"chest pain. \"  Per son.

## 2023-04-03 NOTE — DISCHARGE INSTRUCTIONS
Go back to the Namenda 5 mg dose until you speak to Jackie Martinez. Make sure to stay well-hydrated, and follow closely with your doctors. Please return if at all worsened or concerned.

## 2023-04-04 ENCOUNTER — HOME CARE VISIT (OUTPATIENT)
Age: 78
End: 2023-04-04

## 2023-04-07 ENCOUNTER — HOME CARE VISIT (OUTPATIENT)
Age: 78
End: 2023-04-07

## 2023-04-07 ENCOUNTER — HOSPITAL ENCOUNTER (OUTPATIENT)
Facility: HOSPITAL | Age: 78
End: 2023-04-07
Payer: MEDICARE

## 2023-04-07 DIAGNOSIS — R41.3 MEMORY LOSS: ICD-10-CM

## 2023-04-07 DIAGNOSIS — R41.82 ALTERED MENTAL STATUS, UNSPECIFIED ALTERED MENTAL STATUS TYPE: ICD-10-CM

## 2023-04-07 PROCEDURE — 95819 EEG AWAKE AND ASLEEP: CPT

## 2023-04-09 LAB
BACTERIA SPEC CULT: NORMAL
BACTERIA SPEC CULT: NORMAL
SERVICE CMNT-IMP: NORMAL
SERVICE CMNT-IMP: NORMAL

## 2023-04-27 ENCOUNTER — APPOINTMENT (OUTPATIENT)
Facility: HOSPITAL | Age: 78
End: 2023-04-27
Payer: MEDICARE

## 2023-04-27 ENCOUNTER — HOSPITAL ENCOUNTER (EMERGENCY)
Facility: HOSPITAL | Age: 78
Discharge: HOME | End: 2023-04-27
Attending: EMERGENCY MEDICINE
Payer: MEDICARE

## 2023-04-27 VITALS
SYSTOLIC BLOOD PRESSURE: 129 MMHG | BODY MASS INDEX: 27.49 KG/M2 | RESPIRATION RATE: 14 BRPM | OXYGEN SATURATION: 96 % | TEMPERATURE: 97.4 F | HEIGHT: 65 IN | DIASTOLIC BLOOD PRESSURE: 59 MMHG | HEART RATE: 54 BPM | WEIGHT: 165 LBS

## 2023-04-27 DIAGNOSIS — F01.A4 MILD VASCULAR DEMENTIA WITH ANXIETY (HCC): Primary | ICD-10-CM

## 2023-04-27 LAB
ANION GAP BLD CALC-SCNC: 10 (ref 10–20)
BASOPHILS # BLD: 0.1 K/UL (ref 0–0.1)
BASOPHILS NFR BLD: 1 % (ref 0–2)
CA-I BLD-MCNC: 1.19 MMOL/L (ref 1.12–1.32)
CHLORIDE BLD-SCNC: 107 MMOL/L (ref 100–108)
CO2 BLD-SCNC: 26 MMOL/L (ref 19–24)
CREAT UR-MCNC: 1.5 MG/DL (ref 0.6–1.3)
DIFFERENTIAL METHOD BLD: ABNORMAL
EOSINOPHIL # BLD: 0.2 K/UL (ref 0–0.4)
EOSINOPHIL NFR BLD: 5 % (ref 0–5)
ERYTHROCYTE [DISTWIDTH] IN BLOOD BY AUTOMATED COUNT: 13.7 % (ref 11.6–14.5)
GLUCOSE BLD STRIP.AUTO-MCNC: 218 MG/DL (ref 74–106)
HCT VFR BLD AUTO: 39 % (ref 36–48)
HGB BLD-MCNC: 12.8 G/DL (ref 13–16)
IMM GRANULOCYTES # BLD AUTO: 0 K/UL (ref 0–0.04)
IMM GRANULOCYTES NFR BLD AUTO: 0 % (ref 0–0.5)
LYMPHOCYTES # BLD: 1.3 K/UL (ref 0.9–3.6)
LYMPHOCYTES NFR BLD: 25 % (ref 21–52)
MCH RBC QN AUTO: 29 PG (ref 24–34)
MCHC RBC AUTO-ENTMCNC: 32.8 G/DL (ref 31–37)
MCV RBC AUTO: 88.2 FL (ref 78–100)
MONOCYTES # BLD: 0.6 K/UL (ref 0.05–1.2)
MONOCYTES NFR BLD: 12 % (ref 3–10)
NEUTS SEG # BLD: 3 K/UL (ref 1.8–8)
NEUTS SEG NFR BLD: 58 % (ref 40–73)
NRBC # BLD: 0 K/UL (ref 0–0.01)
NRBC BLD-RTO: 0 PER 100 WBC
PLATELET # BLD AUTO: 119 K/UL (ref 135–420)
PMV BLD AUTO: 10.1 FL (ref 9.2–11.8)
POTASSIUM BLD-SCNC: 3.7 MMOL/L (ref 3.5–5.5)
RBC # BLD AUTO: 4.42 M/UL (ref 4.35–5.65)
SODIUM BLD-SCNC: 143 MMOL/L (ref 136–145)
TROPONIN I BLD-MCNC: <0.04 NG/ML (ref 0–0.08)
TROPONIN I BLD-MCNC: <0.04 NG/ML (ref 0–0.08)
WBC # BLD AUTO: 5.1 K/UL (ref 4.6–13.2)

## 2023-04-27 PROCEDURE — 93005 ELECTROCARDIOGRAM TRACING: CPT | Performed by: EMERGENCY MEDICINE

## 2023-04-27 PROCEDURE — 71045 X-RAY EXAM CHEST 1 VIEW: CPT

## 2023-04-27 PROCEDURE — 80047 BASIC METABLC PNL IONIZED CA: CPT

## 2023-04-27 PROCEDURE — 85025 COMPLETE CBC W/AUTO DIFF WBC: CPT

## 2023-04-27 PROCEDURE — 84484 ASSAY OF TROPONIN QUANT: CPT

## 2023-04-27 PROCEDURE — 99285 EMERGENCY DEPT VISIT HI MDM: CPT

## 2023-04-27 RX ORDER — LORAZEPAM 2 MG/ML
INJECTION INTRAMUSCULAR
Status: DISCONTINUED
Start: 2023-04-27 | End: 2023-04-27 | Stop reason: WASHOUT

## 2023-04-27 NOTE — ED NOTES
Pt A0X4, responds to commands, 95% on room air, placed on cardiac monitor, EKG completed, wife at bedside, call bell within reach, will continue to monitor.      Devonte Lyle RN  04/27/23 7906

## 2023-04-27 NOTE — ED NOTES
Discharge teaching provided to pt regarding treatment received, medications prescribed, and follow-up care. Pt verbalized understanding directions and follow up care. Pt left ambulatory with discharge paperwork in hand.       Bridgette Calvin RN  04/27/23 0631

## 2023-04-27 NOTE — ED PROVIDER NOTES
`[facility] EMERGENCY DEPT  eMERGENCY dEPARTMENT eNCOUnter      Pt Name: Trini Daniels  MRN: [id number]  Josegfmarilia 1945 of evaluation: 4/27/2023  Provider:Mohit Sim MD    CHIEF COMPLAINT         HPI  Carey Navarro is a [de-identified] y.o. male  c/o     ROS  Review of Systems    Except as noted above the remainder of the review of systems was reviewed and negative. PAST MEDICAL HISTORY     Past Medical History:   Diagnosis Date    Carotid artery stenosis     Bilateral Carotid Artery Stenosis    Dementia (HCC)     Diabetes ([facility] [address]     Glaucoma     High cholesterol     Hypertension     S/P CABG (coronary artery bypass graft)     CAD S/P CABG    S/P TAVR (transcatheter aortic valve replacement)          SURGICAL HISTORY       Past Surgical History:   Procedure Laterality Date    [place] UNLISTED PROCEDURE CARDIAC SURGERY      bypass         CURRENTMEDICATIONS       Previous Medications    APOAEQUORIN (PREVAGEN) 10 MG CAPS    as directed    ASPIRIN 81 MG EC TABLET    Take 81 mg by mouth daily    B COMPLEX VITAMINS (VITAMIN B COMPLEX 100 IJ)    as directed    DONEPEZIL (ARICEPT) 10 MG TABLET    Take 1 tablet by mouth nightly    EMPAGLIFLOZIN (JARDIANCE) 25 MG TABLET    Take 25 mg by mouth daily    GLIMEPIRIDE (AMARYL) 4 MG TABLET    Take 4 mg by mouth    INULIN (FIBER CHOICE PO)    Take 1 tablet by mouth daily    MAGNESIUM PO    Take 1 tablet by mouth daily    MEMANTINE (NAMENDA) 5 MG TABLET    Take 2 tablets by mouth daily    METOPROLOL TARTRATE (LOPRESSOR) 25 MG TABLET    Take 12.5 mg by mouth 2 times daily    MULTIPLE VITAMINS-MINERALS (MULTIVITAMIN ADULT EXTRA C PO)    1 tablet    OMEPRAZOLE (PRILOSEC) 20 MG DELAYED RELEASE CAPSULE    Take 2 capsules by mouth Daily    ROSUVASTATIN (CRESTOR) 10 MG TABLET    Take 10 mg by mouth daily       ALLERGIES     Patient has no known allergies.     FAMILY HISTORY       Family History   Problem Relation Age of Onset    Diabetes Other           SOCIAL

## 2023-04-28 LAB
EKG ATRIAL RATE: 52 BPM
EKG DIAGNOSIS: NORMAL
EKG Q-T INTERVAL: 498 MS
EKG QRS DURATION: 78 MS
EKG QTC CALCULATION (BAZETT): 463 MS
EKG R AXIS: -32 DEGREES
EKG T AXIS: 33 DEGREES
EKG VENTRICULAR RATE: 52 BPM

## 2023-04-28 PROCEDURE — 93010 ELECTROCARDIOGRAM REPORT: CPT | Performed by: INTERNAL MEDICINE

## 2023-05-08 ENCOUNTER — HOSPITAL ENCOUNTER (EMERGENCY)
Facility: HOSPITAL | Age: 78
Discharge: HOME | End: 2023-05-08
Attending: EMERGENCY MEDICINE
Payer: MEDICARE

## 2023-05-08 ENCOUNTER — APPOINTMENT (OUTPATIENT)
Facility: HOSPITAL | Age: 78
End: 2023-05-08
Payer: MEDICARE

## 2023-05-08 VITALS
DIASTOLIC BLOOD PRESSURE: 65 MMHG | HEART RATE: 49 BPM | HEIGHT: 67 IN | OXYGEN SATURATION: 99 % | BODY MASS INDEX: 22.76 KG/M2 | SYSTOLIC BLOOD PRESSURE: 141 MMHG | TEMPERATURE: 96.9 F | RESPIRATION RATE: 12 BRPM | WEIGHT: 145 LBS

## 2023-05-08 DIAGNOSIS — I45.10 RIGHT BUNDLE BRANCH BLOCK: ICD-10-CM

## 2023-05-08 DIAGNOSIS — R00.1 BRADYCARDIA: Primary | ICD-10-CM

## 2023-05-08 DIAGNOSIS — F03.90 DEMENTIA, UNSPECIFIED DEMENTIA SEVERITY, UNSPECIFIED DEMENTIA TYPE, UNSPECIFIED WHETHER BEHAVIORAL, PSYCHOTIC, OR MOOD DISTURBANCE OR ANXIETY (HCC): ICD-10-CM

## 2023-05-08 LAB
ALBUMIN SERPL-MCNC: 3.7 G/DL (ref 3.4–5)
ALBUMIN/GLOB SERPL: 1 (ref 0.8–1.7)
ALP SERPL-CCNC: 87 U/L (ref 45–117)
ALT SERPL-CCNC: 33 U/L (ref 16–61)
AMPHET UR QL SCN: NEGATIVE
ANION GAP SERPL CALC-SCNC: 5 MMOL/L (ref 3–18)
APPEARANCE UR: CLEAR
AST SERPL-CCNC: 37 U/L (ref 10–38)
BARBITURATES UR QL SCN: NEGATIVE
BASOPHILS # BLD: 0 K/UL (ref 0–0.1)
BASOPHILS NFR BLD: 1 % (ref 0–2)
BENZODIAZ UR QL: NEGATIVE
BILIRUB SERPL-MCNC: 0.7 MG/DL (ref 0.2–1)
BILIRUB UR QL: NEGATIVE
BUN SERPL-MCNC: 21 MG/DL (ref 7–18)
BUN/CREAT SERPL: 13 (ref 12–20)
CALCIUM SERPL-MCNC: 9 MG/DL (ref 8.5–10.1)
CANNABINOIDS UR QL SCN: NEGATIVE
CHLORIDE SERPL-SCNC: 112 MMOL/L (ref 100–111)
CO2 SERPL-SCNC: 25 MMOL/L (ref 21–32)
COCAINE UR QL SCN: NEGATIVE
COLOR UR: YELLOW
CREAT SERPL-MCNC: 1.64 MG/DL (ref 0.6–1.3)
DIFFERENTIAL METHOD BLD: ABNORMAL
EKG ATRIAL RATE: 69 BPM
EKG DIAGNOSIS: NORMAL
EKG Q-T INTERVAL: 548 MS
EKG QRS DURATION: 162 MS
EKG QTC CALCULATION (BAZETT): 514 MS
EKG R AXIS: 166 DEGREES
EKG T AXIS: -7 DEGREES
EKG VENTRICULAR RATE: 53 BPM
EOSINOPHIL # BLD: 0.2 K/UL (ref 0–0.4)
EOSINOPHIL NFR BLD: 3 % (ref 0–5)
ERYTHROCYTE [DISTWIDTH] IN BLOOD BY AUTOMATED COUNT: 14 % (ref 11.6–14.5)
ETHANOL SERPL-MCNC: <3 MG/DL (ref 0–3)
GLOBULIN SER CALC-MCNC: 3.6 G/DL (ref 2–4)
GLUCOSE SERPL-MCNC: 139 MG/DL (ref 74–99)
GLUCOSE UR STRIP.AUTO-MCNC: >1000 MG/DL
HCT VFR BLD AUTO: 38.7 % (ref 36–48)
HGB BLD-MCNC: 12.6 G/DL (ref 13–16)
HGB UR QL STRIP: NEGATIVE
IMM GRANULOCYTES # BLD AUTO: 0 K/UL (ref 0–0.04)
IMM GRANULOCYTES NFR BLD AUTO: 0 % (ref 0–0.5)
KETONES UR QL STRIP.AUTO: NEGATIVE MG/DL
LACTATE SERPL-SCNC: 1.6 MMOL/L (ref 0.4–2)
LEUKOCYTE ESTERASE UR QL STRIP.AUTO: NEGATIVE
LYMPHOCYTES # BLD: 1.3 K/UL (ref 0.9–3.6)
LYMPHOCYTES NFR BLD: 26 % (ref 21–52)
Lab: NORMAL
MAGNESIUM SERPL-MCNC: 2.4 MG/DL (ref 1.6–2.6)
MCH RBC QN AUTO: 28.5 PG (ref 24–34)
MCHC RBC AUTO-ENTMCNC: 32.6 G/DL (ref 31–37)
MCV RBC AUTO: 87.6 FL (ref 78–100)
METHADONE UR QL: NEGATIVE
MONOCYTES # BLD: 0.5 K/UL (ref 0.05–1.2)
MONOCYTES NFR BLD: 11 % (ref 3–10)
NEUTS SEG # BLD: 3 K/UL (ref 1.8–8)
NEUTS SEG NFR BLD: 59 % (ref 40–73)
NITRITE UR QL STRIP.AUTO: NEGATIVE
NRBC # BLD: 0 K/UL (ref 0–0.01)
NRBC BLD-RTO: 0 PER 100 WBC
OPIATES UR QL: NEGATIVE
PCP UR QL: NEGATIVE
PH UR STRIP: 5.5 (ref 5–8)
PLATELET # BLD AUTO: 100 K/UL (ref 135–420)
PMV BLD AUTO: 10.3 FL (ref 9.2–11.8)
POTASSIUM SERPL-SCNC: 3.8 MMOL/L (ref 3.5–5.5)
PROT SERPL-MCNC: 7.3 G/DL (ref 6.4–8.2)
PROT UR STRIP-MCNC: NEGATIVE MG/DL
RBC # BLD AUTO: 4.42 M/UL (ref 4.35–5.65)
SODIUM SERPL-SCNC: 142 MMOL/L (ref 136–145)
SP GR UR REFRACTOMETRY: 1.03 (ref 1–1.03)
TROPONIN I SERPL HS-MCNC: 12 NG/L (ref 0–78)
TSH SERPL DL<=0.05 MIU/L-ACNC: 2.98 UIU/ML (ref 0.36–3.74)
UROBILINOGEN UR QL STRIP.AUTO: 1 EU/DL (ref 0.2–1)
WBC # BLD AUTO: 5 K/UL (ref 4.6–13.2)

## 2023-05-08 PROCEDURE — 70450 CT HEAD/BRAIN W/O DYE: CPT

## 2023-05-08 PROCEDURE — 99285 EMERGENCY DEPT VISIT HI MDM: CPT

## 2023-05-08 PROCEDURE — 85025 COMPLETE CBC W/AUTO DIFF WBC: CPT

## 2023-05-08 PROCEDURE — 83605 ASSAY OF LACTIC ACID: CPT

## 2023-05-08 PROCEDURE — 80307 DRUG TEST PRSMV CHEM ANLYZR: CPT

## 2023-05-08 PROCEDURE — 82077 ASSAY SPEC XCP UR&BREATH IA: CPT

## 2023-05-08 PROCEDURE — 93010 ELECTROCARDIOGRAM REPORT: CPT | Performed by: INTERNAL MEDICINE

## 2023-05-08 PROCEDURE — 93005 ELECTROCARDIOGRAM TRACING: CPT | Performed by: EMERGENCY MEDICINE

## 2023-05-08 PROCEDURE — 71045 X-RAY EXAM CHEST 1 VIEW: CPT

## 2023-05-08 PROCEDURE — 81003 URINALYSIS AUTO W/O SCOPE: CPT

## 2023-05-08 PROCEDURE — 80053 COMPREHEN METABOLIC PANEL: CPT

## 2023-05-08 PROCEDURE — 83735 ASSAY OF MAGNESIUM: CPT

## 2023-05-08 PROCEDURE — 84443 ASSAY THYROID STIM HORMONE: CPT

## 2023-05-08 PROCEDURE — 84484 ASSAY OF TROPONIN QUANT: CPT

## 2023-05-08 NOTE — ED PROVIDER NOTES
Emergency Department Encounter  [facility] EMERGENCY DEPT    Patient: Shanell Templeton  MRN: [id number]  : 1945  Date of Evaluation: 2023  ED Provider: Rebecka Brunner, MD    Chief Complaint       Chief Complaint   Patient presents with    Altered Mental Status     Elyssa Man is a [de-identified] y.o. male who presents to the emergency department to the emergency department via EMS with report that he was found possibly unresponsive. By report the patient was trying to wake his wife up. Is a possibility the patient had altered mental status. Son stated the patient has at least moderate dementia and sometimes not able to recognize different family members including the son. The patient has been afebrile. ROS:     At least 10 systems reviewed and otherwise acutely negative except as in the [address]   Review of systems in HPI per patient's son, EMS, and patient    Past History     Past Medical History:   Diagnosis Date    Carotid artery stenosis     Bilateral Carotid Artery Stenosis    Dementia (HCC)     Diabetes ([facility] [address]     Glaucoma     High cholesterol     Hypertension     S/P CABG (coronary artery bypass graft)     CAD S/P CABG    S/P TAVR (transcatheter aortic valve replacement)      Past Surgical History:   Procedure Laterality Date    [place] UNLISTED PROCEDURE CARDIAC SURGERY      bypass     Social History     Socioeconomic History    Marital status:    Tobacco Use    Smoking status: Never    Smokeless tobacco: Never   Substance and Sexual Activity    Alcohol use: No    Drug use: No       Medications/Allergies     Previous Medications    APOAEQUORIN (PREVAGEN) 10 MG CAPS    as directed    ASPIRIN 81 MG EC TABLET    Take 81 mg by mouth daily    B COMPLEX VITAMINS (VITAMIN B COMPLEX 100 IJ)    as directed    DONEPEZIL (ARICEPT) 10 MG TABLET    Take 1 tablet by mouth nightly    EMPAGLIFLOZIN (JARDIANCE) 25 MG TABLET    Take 25 mg by mouth daily    GLIMEPIRIDE (AMARYL) 4 MG TABLET    Take 4

## 2023-05-08 NOTE — ED TRIAGE NOTES
Pt arrived from home via ems. His wife reported altered mental status. Per ems, the pt was non verbal but responsive to stimuli with them.

## 2023-05-08 NOTE — DISCHARGE INSTRUCTIONS
1.  Changed the metoprolol to 6.25 mg twice daily (or half dose of what he is currently taking.). Otherwise, continue home medication as previously prescribed. 2.  Make an appointment follow-up with cardiology. 3.  Return to emergency department for any worsening or concerning symptoms.

## 2023-06-20 ENCOUNTER — HOSPITAL ENCOUNTER (EMERGENCY)
Facility: HOSPITAL | Age: 78
Discharge: HOME | End: 2023-06-20
Attending: EMERGENCY MEDICINE
Payer: MEDICARE

## 2023-06-20 VITALS
TEMPERATURE: 98 F | SYSTOLIC BLOOD PRESSURE: 117 MMHG | DIASTOLIC BLOOD PRESSURE: 62 MMHG | BODY MASS INDEX: 29.44 KG/M2 | HEIGHT: 62 IN | OXYGEN SATURATION: 96 % | HEART RATE: 58 BPM | RESPIRATION RATE: 18 BRPM | WEIGHT: 160 LBS

## 2023-06-20 DIAGNOSIS — R07.89 ATYPICAL CHEST PAIN: Primary | ICD-10-CM

## 2023-06-20 LAB
ALBUMIN SERPL-MCNC: 3.4 G/DL (ref 3.4–5)
ALBUMIN/GLOB SERPL: 1.2 (ref 0.8–1.7)
ALP SERPL-CCNC: 95 U/L (ref 45–117)
ALT SERPL-CCNC: 34 U/L (ref 16–61)
ANION GAP SERPL CALC-SCNC: 4 MMOL/L (ref 3–18)
AST SERPL-CCNC: 22 U/L (ref 10–38)
BASOPHILS # BLD: 0 K/UL (ref 0–0.1)
BASOPHILS NFR BLD: 1 % (ref 0–2)
BILIRUB SERPL-MCNC: 0.5 MG/DL (ref 0.2–1)
BUN SERPL-MCNC: 14 MG/DL (ref 7–18)
BUN/CREAT SERPL: 8 (ref 12–20)
CALCIUM SERPL-MCNC: 8.5 MG/DL (ref 8.5–10.1)
CHLORIDE SERPL-SCNC: 107 MMOL/L (ref 100–111)
CO2 SERPL-SCNC: 27 MMOL/L (ref 21–32)
CREAT SERPL-MCNC: 1.8 MG/DL (ref 0.6–1.3)
DIFFERENTIAL METHOD BLD: ABNORMAL
EKG DIAGNOSIS: NORMAL
EKG Q-T INTERVAL: 446 MS
EKG QRS DURATION: 82 MS
EKG QTC CALCULATION (BAZETT): 452 MS
EKG R AXIS: -42 DEGREES
EKG T AXIS: 65 DEGREES
EKG VENTRICULAR RATE: 62 BPM
EOSINOPHIL # BLD: 0.1 K/UL (ref 0–0.4)
EOSINOPHIL NFR BLD: 3 % (ref 0–5)
ERYTHROCYTE [DISTWIDTH] IN BLOOD BY AUTOMATED COUNT: 13.5 % (ref 11.6–14.5)
GLOBULIN SER CALC-MCNC: 2.9 G/DL (ref 2–4)
GLUCOSE BLD STRIP.AUTO-MCNC: 112 MG/DL (ref 70–110)
GLUCOSE SERPL-MCNC: 401 MG/DL (ref 74–99)
HCT VFR BLD AUTO: 35.9 % (ref 36–48)
HGB BLD-MCNC: 11.9 G/DL (ref 13–16)
IMM GRANULOCYTES # BLD AUTO: 0 K/UL (ref 0–0.04)
IMM GRANULOCYTES NFR BLD AUTO: 0 % (ref 0–0.5)
LYMPHOCYTES # BLD: 0.8 K/UL (ref 0.9–3.6)
LYMPHOCYTES NFR BLD: 19 % (ref 21–52)
MCH RBC QN AUTO: 29.1 PG (ref 24–34)
MCHC RBC AUTO-ENTMCNC: 33.1 G/DL (ref 31–37)
MCV RBC AUTO: 87.8 FL (ref 78–100)
MONOCYTES # BLD: 0.3 K/UL (ref 0.05–1.2)
MONOCYTES NFR BLD: 8 % (ref 3–10)
NEUTS SEG # BLD: 3 K/UL (ref 1.8–8)
NEUTS SEG NFR BLD: 70 % (ref 40–73)
NRBC # BLD: 0 K/UL (ref 0–0.01)
NRBC BLD-RTO: 0 PER 100 WBC
PLATELET # BLD AUTO: 110 K/UL (ref 135–420)
PMV BLD AUTO: 10.4 FL (ref 9.2–11.8)
POTASSIUM SERPL-SCNC: 3.6 MMOL/L (ref 3.5–5.5)
PROT SERPL-MCNC: 6.3 G/DL (ref 6.4–8.2)
RBC # BLD AUTO: 4.09 M/UL (ref 4.35–5.65)
SODIUM SERPL-SCNC: 138 MMOL/L (ref 136–145)
TROPONIN I SERPL HS-MCNC: 12 NG/L (ref 0–78)
TROPONIN I SERPL HS-MCNC: 17 NG/L (ref 0–78)
WBC # BLD AUTO: 4.3 K/UL (ref 4.6–13.2)

## 2023-06-20 PROCEDURE — 99284 EMERGENCY DEPT VISIT MOD MDM: CPT

## 2023-06-20 PROCEDURE — 85025 COMPLETE CBC W/AUTO DIFF WBC: CPT

## 2023-06-20 PROCEDURE — 93010 ELECTROCARDIOGRAM REPORT: CPT | Performed by: INTERNAL MEDICINE

## 2023-06-20 PROCEDURE — 93005 ELECTROCARDIOGRAM TRACING: CPT | Performed by: EMERGENCY MEDICINE

## 2023-06-20 PROCEDURE — 84484 ASSAY OF TROPONIN QUANT: CPT

## 2023-06-20 PROCEDURE — 6370000000 HC RX 637 (ALT 250 FOR IP): Performed by: EMERGENCY MEDICINE

## 2023-06-20 PROCEDURE — 96374 THER/PROPH/DIAG INJ IV PUSH: CPT

## 2023-06-20 PROCEDURE — 80053 COMPREHEN METABOLIC PANEL: CPT

## 2023-06-20 PROCEDURE — 82962 GLUCOSE BLOOD TEST: CPT

## 2023-06-20 RX ADMIN — INSULIN HUMAN 10 UNITS: 100 INJECTION, SOLUTION PARENTERAL at 12:31

## 2023-06-20 ASSESSMENT — PAIN DESCRIPTION - DESCRIPTORS: DESCRIPTORS: SQUEEZING

## 2023-06-20 ASSESSMENT — PAIN SCALES - GENERAL: PAINLEVEL_OUTOF10: 2

## 2023-06-20 ASSESSMENT — PAIN - FUNCTIONAL ASSESSMENT: PAIN_FUNCTIONAL_ASSESSMENT: 0-10

## 2023-06-20 ASSESSMENT — PAIN DESCRIPTION - LOCATION: LOCATION: CHEST

## 2023-06-20 NOTE — ED NOTES
I have reviewed the discharge instructions with the patient. The patient verbalized understanding of instructions with no further questions.       Nicky Torrez RN  06/20/23 6179

## 2023-06-20 NOTE — ED PROVIDER NOTES
Glucose 401 (HH) 74 - 99 mg/dL    BUN 14 7.0 - 18 MG/DL    Creatinine 1.80 (H) 0.6 - 1.3 MG/DL    Bun/Cre Ratio 8 (L) 12 - 20      Est, Glom Filt Rate 38 (L) >60 ml/min/1.73m2    Calcium 8.5 8.5 - 10.1 MG/DL    Total Bilirubin 0.5 0.2 - 1.0 MG/DL    ALT 34 16 - 61 U/L    AST 22 10 - 38 U/L    Alk Phosphatase 95 45 - 117 U/L    Total Protein 6.3 (L) 6.4 - 8.2 g/dL    Albumin 3.4 3.4 - 5.0 g/dL    Globulin 2.9 2.0 - 4.0 g/dL    Albumin/Globulin Ratio 1.2 0.8 - 1.7     Troponin    Collection Time: 06/20/23 10:32 AM   Result Value Ref Range    Troponin, High Sensitivity 12 0 - 78 ng/L   Troponin    Collection Time: 06/20/23 12:23 PM   Result Value Ref Range    Troponin, High Sensitivity 17 0 - 78 ng/L   POCT Glucose    Collection Time: 06/20/23  1:41 PM   Result Value Ref Range    POC Glucose 112 (H) 70 - 110 mg/dL       Radiologic Studies -   No orders to display           Medical Decision Making   I am the first provider for this patient. I reviewed the vital signs, available nursing notes, past medical history, past surgical history, family history and social history. Vital Signs-Reviewed the patient's vital signs. EKG: Time 10:16 AM, rate 62, normal sinus rhythm, left axis deviation, normal intervals, nonspecific T wave abnormalities, no acute ST segment changes. ED Course: Progress Notes, Reevaluation, and Consults:    Provider Notes (Medical Decision Making):       MDM  Number of Diagnoses or Management Options  Atypical chest pain  Diagnosis management comments: 23-year-old man presented to the emergency department after an episode of chest pain. He does have a history of coronary disease, no recent interventions. His initial EKG does not reveal any acute changes, plan to obtain troponins x2, CBC, CMP. Had a long discussion with the son regarding plan for work-up but if negative likely discharge home given his history of dementia, his son is in agreement.   Also found to have elevated blood

## 2023-06-20 NOTE — ED TRIAGE NOTES
Patient arrived via EMS with complaints of right shoulder and leg pain followed by weakness while out in the year cutting the grass.   No s/sx of distress noted

## 2023-06-30 ENCOUNTER — HOSPITAL ENCOUNTER (EMERGENCY)
Facility: HOSPITAL | Age: 78
Discharge: HOME | End: 2023-07-01
Attending: EMERGENCY MEDICINE
Payer: MEDICARE

## 2023-06-30 DIAGNOSIS — E87.6 HYPOKALEMIA: Primary | ICD-10-CM

## 2023-06-30 PROCEDURE — 80053 COMPREHEN METABOLIC PANEL: CPT

## 2023-06-30 PROCEDURE — 99285 EMERGENCY DEPT VISIT HI MDM: CPT

## 2023-06-30 PROCEDURE — 93005 ELECTROCARDIOGRAM TRACING: CPT | Performed by: EMERGENCY MEDICINE

## 2023-06-30 PROCEDURE — 85025 COMPLETE CBC W/AUTO DIFF WBC: CPT

## 2023-06-30 PROCEDURE — 83735 ASSAY OF MAGNESIUM: CPT

## 2023-06-30 PROCEDURE — 94762 N-INVAS EAR/PLS OXIMTRY CONT: CPT

## 2023-06-30 PROCEDURE — 84443 ASSAY THYROID STIM HORMONE: CPT

## 2023-06-30 ASSESSMENT — LIFESTYLE VARIABLES
HOW OFTEN DO YOU HAVE A DRINK CONTAINING ALCOHOL: PATIENT DECLINED
HOW MANY STANDARD DRINKS CONTAINING ALCOHOL DO YOU HAVE ON A TYPICAL DAY: PATIENT DECLINED

## 2023-07-01 ENCOUNTER — APPOINTMENT (OUTPATIENT)
Facility: HOSPITAL | Age: 78
End: 2023-07-01
Payer: MEDICARE

## 2023-07-01 VITALS
DIASTOLIC BLOOD PRESSURE: 67 MMHG | WEIGHT: 171 LBS | TEMPERATURE: 98.1 F | HEART RATE: 69 BPM | RESPIRATION RATE: 14 BRPM | OXYGEN SATURATION: 97 % | HEIGHT: 62 IN | BODY MASS INDEX: 31.47 KG/M2 | SYSTOLIC BLOOD PRESSURE: 128 MMHG

## 2023-07-01 LAB
ALBUMIN SERPL-MCNC: 3.3 G/DL (ref 3.4–5)
ALBUMIN/GLOB SERPL: 1.2 (ref 0.8–1.7)
ALP SERPL-CCNC: 92 U/L (ref 45–117)
ALT SERPL-CCNC: 38 U/L (ref 16–61)
ANION GAP SERPL CALC-SCNC: 4 MMOL/L (ref 3–18)
APPEARANCE UR: CLEAR
AST SERPL-CCNC: 31 U/L (ref 10–38)
BACTERIA URNS QL MICRO: NEGATIVE /HPF
BASOPHILS # BLD: 0 K/UL (ref 0–0.1)
BASOPHILS NFR BLD: 1 % (ref 0–2)
BILIRUB SERPL-MCNC: 0.5 MG/DL (ref 0.2–1)
BILIRUB UR QL: NEGATIVE
BUN SERPL-MCNC: 16 MG/DL (ref 7–18)
BUN/CREAT SERPL: 11 (ref 12–20)
CALCIUM SERPL-MCNC: 8.2 MG/DL (ref 8.5–10.1)
CHLORIDE SERPL-SCNC: 109 MMOL/L (ref 100–111)
CHP ED QC CHECK: YES
CO2 SERPL-SCNC: 27 MMOL/L (ref 21–32)
COLOR UR: ABNORMAL
CREAT SERPL-MCNC: 1.51 MG/DL (ref 0.6–1.3)
DIFFERENTIAL METHOD BLD: ABNORMAL
EKG ATRIAL RATE: 58 BPM
EKG DIAGNOSIS: NORMAL
EKG P AXIS: 47 DEGREES
EKG P-R INTERVAL: 294 MS
EKG Q-T INTERVAL: 482 MS
EKG QRS DURATION: 90 MS
EKG QTC CALCULATION (BAZETT): 473 MS
EKG R AXIS: -31 DEGREES
EKG T AXIS: 21 DEGREES
EKG VENTRICULAR RATE: 58 BPM
EOSINOPHIL # BLD: 0.2 K/UL (ref 0–0.4)
EOSINOPHIL NFR BLD: 4 % (ref 0–5)
EPITH CASTS URNS QL MICRO: NEGATIVE /LPF (ref 0–5)
ERYTHROCYTE [DISTWIDTH] IN BLOOD BY AUTOMATED COUNT: 13.3 % (ref 11.6–14.5)
GLOBULIN SER CALC-MCNC: 2.8 G/DL (ref 2–4)
GLUCOSE BLD-MCNC: 256 MG/DL
GLUCOSE SERPL-MCNC: 256 MG/DL (ref 74–99)
GLUCOSE UR STRIP.AUTO-MCNC: >1000 MG/DL
HCT VFR BLD AUTO: 35.7 % (ref 36–48)
HGB BLD-MCNC: 11.6 G/DL (ref 13–16)
HGB UR QL STRIP: NEGATIVE
IMM GRANULOCYTES # BLD AUTO: 0 K/UL (ref 0–0.04)
IMM GRANULOCYTES NFR BLD AUTO: 0 % (ref 0–0.5)
KETONES UR QL STRIP.AUTO: NEGATIVE MG/DL
LACTATE SERPL-SCNC: 1.5 MMOL/L (ref 0.4–2)
LEUKOCYTE ESTERASE UR QL STRIP.AUTO: NEGATIVE
LYMPHOCYTES # BLD: 1.3 K/UL (ref 0.9–3.6)
LYMPHOCYTES NFR BLD: 26 % (ref 21–52)
MAGNESIUM SERPL-MCNC: 2 MG/DL (ref 1.6–2.6)
MCH RBC QN AUTO: 28.7 PG (ref 24–34)
MCHC RBC AUTO-ENTMCNC: 32.5 G/DL (ref 31–37)
MCV RBC AUTO: 88.4 FL (ref 78–100)
MONOCYTES # BLD: 0.5 K/UL (ref 0.05–1.2)
MONOCYTES NFR BLD: 10 % (ref 3–10)
NEUTS SEG # BLD: 3 K/UL (ref 1.8–8)
NEUTS SEG NFR BLD: 59 % (ref 40–73)
NITRITE UR QL STRIP.AUTO: NEGATIVE
NRBC # BLD: 0 K/UL (ref 0–0.01)
NRBC BLD-RTO: 0 PER 100 WBC
PH UR STRIP: 5.5 (ref 5–8)
PLATELET # BLD AUTO: 99 K/UL (ref 135–420)
PMV BLD AUTO: 10.6 FL (ref 9.2–11.8)
POTASSIUM SERPL-SCNC: 3.3 MMOL/L (ref 3.5–5.5)
PROT SERPL-MCNC: 6.1 G/DL (ref 6.4–8.2)
PROT UR STRIP-MCNC: 30 MG/DL
RBC # BLD AUTO: 4.04 M/UL (ref 4.35–5.65)
RBC #/AREA URNS HPF: NEGATIVE /HPF (ref 0–5)
SODIUM SERPL-SCNC: 140 MMOL/L (ref 136–145)
SP GR UR REFRACTOMETRY: 1.02 (ref 1–1.03)
TSH SERPL DL<=0.05 MIU/L-ACNC: 3.02 UIU/ML (ref 0.36–3.74)
UROBILINOGEN UR QL STRIP.AUTO: 1 EU/DL (ref 0.2–1)
WBC # BLD AUTO: 5 K/UL (ref 4.6–13.2)
WBC URNS QL MICRO: NEGATIVE /HPF (ref 0–4)

## 2023-07-01 PROCEDURE — 71045 X-RAY EXAM CHEST 1 VIEW: CPT

## 2023-07-01 PROCEDURE — 70450 CT HEAD/BRAIN W/O DYE: CPT

## 2023-07-01 PROCEDURE — 94762 N-INVAS EAR/PLS OXIMTRY CONT: CPT

## 2023-07-01 PROCEDURE — 83605 ASSAY OF LACTIC ACID: CPT

## 2023-07-01 PROCEDURE — 81001 URINALYSIS AUTO W/SCOPE: CPT

## 2023-07-01 RX ORDER — POTASSIUM CHLORIDE 20 MEQ/1
40 TABLET, EXTENDED RELEASE ORAL ONCE
Status: DISCONTINUED | OUTPATIENT
Start: 2023-07-01 | End: 2023-07-01 | Stop reason: HOSPADM

## 2023-07-20 ENCOUNTER — HOSPITAL ENCOUNTER (EMERGENCY)
Facility: HOSPITAL | Age: 78
Discharge: HOME | End: 2023-07-20
Attending: EMERGENCY MEDICINE
Payer: MEDICARE

## 2023-07-20 ENCOUNTER — APPOINTMENT (OUTPATIENT)
Facility: HOSPITAL | Age: 78
End: 2023-07-20
Payer: MEDICARE

## 2023-07-20 VITALS
BODY MASS INDEX: 30.18 KG/M2 | DIASTOLIC BLOOD PRESSURE: 72 MMHG | SYSTOLIC BLOOD PRESSURE: 144 MMHG | TEMPERATURE: 97.7 F | OXYGEN SATURATION: 96 % | HEART RATE: 59 BPM | RESPIRATION RATE: 14 BRPM | WEIGHT: 165 LBS

## 2023-07-20 DIAGNOSIS — R52 BODY ACHES: Primary | ICD-10-CM

## 2023-07-20 LAB
ALBUMIN SERPL-MCNC: 3.4 G/DL (ref 3.4–5)
ALBUMIN/GLOB SERPL: 1.1 (ref 0.8–1.7)
ALP SERPL-CCNC: 96 U/L (ref 45–117)
ALT SERPL-CCNC: 37 U/L (ref 16–61)
ANION GAP SERPL CALC-SCNC: 6 MMOL/L (ref 3–18)
APPEARANCE UR: CLEAR
AST SERPL-CCNC: 26 U/L (ref 10–38)
BACTERIA URNS QL MICRO: ABNORMAL /HPF
BASOPHILS # BLD: 0 K/UL (ref 0–0.1)
BASOPHILS NFR BLD: 1 % (ref 0–2)
BILIRUB SERPL-MCNC: 0.6 MG/DL (ref 0.2–1)
BILIRUB UR QL: NEGATIVE
BUN SERPL-MCNC: 20 MG/DL (ref 7–18)
BUN/CREAT SERPL: 13 (ref 12–20)
CALCIUM SERPL-MCNC: 8.6 MG/DL (ref 8.5–10.1)
CHLORIDE SERPL-SCNC: 108 MMOL/L (ref 100–111)
CO2 SERPL-SCNC: 26 MMOL/L (ref 21–32)
COLOR UR: YELLOW
CREAT SERPL-MCNC: 1.57 MG/DL (ref 0.6–1.3)
DIFFERENTIAL METHOD BLD: ABNORMAL
EKG ATRIAL RATE: 61 BPM
EKG DIAGNOSIS: NORMAL
EKG P AXIS: 62 DEGREES
EKG P-R INTERVAL: 290 MS
EKG Q-T INTERVAL: 486 MS
EKG QRS DURATION: 84 MS
EKG QTC CALCULATION (BAZETT): 489 MS
EKG R AXIS: -29 DEGREES
EKG T AXIS: 26 DEGREES
EKG VENTRICULAR RATE: 61 BPM
EOSINOPHIL # BLD: 0.2 K/UL (ref 0–0.4)
EOSINOPHIL NFR BLD: 5 % (ref 0–5)
EPITH CASTS URNS QL MICRO: ABNORMAL /LPF (ref 0–5)
ERYTHROCYTE [DISTWIDTH] IN BLOOD BY AUTOMATED COUNT: 13 % (ref 11.6–14.5)
GLOBULIN SER CALC-MCNC: 3.2 G/DL (ref 2–4)
GLUCOSE SERPL-MCNC: 202 MG/DL (ref 74–99)
GLUCOSE UR STRIP.AUTO-MCNC: >1000 MG/DL
HCT VFR BLD AUTO: 37.1 % (ref 36–48)
HGB BLD-MCNC: 12.1 G/DL (ref 13–16)
HGB UR QL STRIP: NEGATIVE
IMM GRANULOCYTES # BLD AUTO: 0 K/UL (ref 0–0.04)
IMM GRANULOCYTES NFR BLD AUTO: 0 % (ref 0–0.5)
KETONES UR QL STRIP.AUTO: NEGATIVE MG/DL
LEUKOCYTE ESTERASE UR QL STRIP.AUTO: NEGATIVE
LIPASE SERPL-CCNC: 286 U/L (ref 73–393)
LYMPHOCYTES # BLD: 1.2 K/UL (ref 0.9–3.6)
LYMPHOCYTES NFR BLD: 24 % (ref 21–52)
MCH RBC QN AUTO: 28.5 PG (ref 24–34)
MCHC RBC AUTO-ENTMCNC: 32.6 G/DL (ref 31–37)
MCV RBC AUTO: 87.5 FL (ref 78–100)
MONOCYTES # BLD: 0.5 K/UL (ref 0.05–1.2)
MONOCYTES NFR BLD: 11 % (ref 3–10)
NEUTS SEG # BLD: 3 K/UL (ref 1.8–8)
NEUTS SEG NFR BLD: 60 % (ref 40–73)
NITRITE UR QL STRIP.AUTO: NEGATIVE
NRBC # BLD: 0 K/UL (ref 0–0.01)
NRBC BLD-RTO: 0 PER 100 WBC
PH UR STRIP: 5.5 (ref 5–8)
PLATELET # BLD AUTO: 117 K/UL (ref 135–420)
PMV BLD AUTO: 10.2 FL (ref 9.2–11.8)
POTASSIUM SERPL-SCNC: 3.6 MMOL/L (ref 3.5–5.5)
PROT SERPL-MCNC: 6.6 G/DL (ref 6.4–8.2)
PROT UR STRIP-MCNC: 30 MG/DL
RBC # BLD AUTO: 4.24 M/UL (ref 4.35–5.65)
RBC #/AREA URNS HPF: ABNORMAL /HPF (ref 0–5)
SODIUM SERPL-SCNC: 140 MMOL/L (ref 136–145)
SP GR UR REFRACTOMETRY: 1.02 (ref 1–1.03)
TROPONIN I SERPL HS-MCNC: 11 NG/L (ref 0–78)
TROPONIN I SERPL HS-MCNC: 12 NG/L (ref 0–78)
UROBILINOGEN UR QL STRIP.AUTO: 0.2 EU/DL (ref 0.2–1)
WBC # BLD AUTO: 5 K/UL (ref 4.6–13.2)
WBC URNS QL MICRO: ABNORMAL /HPF (ref 0–4)

## 2023-07-20 PROCEDURE — 84484 ASSAY OF TROPONIN QUANT: CPT

## 2023-07-20 PROCEDURE — 83690 ASSAY OF LIPASE: CPT

## 2023-07-20 PROCEDURE — 81001 URINALYSIS AUTO W/SCOPE: CPT

## 2023-07-20 PROCEDURE — 80053 COMPREHEN METABOLIC PANEL: CPT

## 2023-07-20 PROCEDURE — 93005 ELECTROCARDIOGRAM TRACING: CPT | Performed by: EMERGENCY MEDICINE

## 2023-07-20 PROCEDURE — 93010 ELECTROCARDIOGRAM REPORT: CPT | Performed by: INTERNAL MEDICINE

## 2023-07-20 PROCEDURE — 71045 X-RAY EXAM CHEST 1 VIEW: CPT

## 2023-07-20 PROCEDURE — 85025 COMPLETE CBC W/AUTO DIFF WBC: CPT

## 2023-07-20 PROCEDURE — 99285 EMERGENCY DEPT VISIT HI MDM: CPT

## 2023-07-20 NOTE — ED NOTES
MD at bedside discussing patient care with son and wife  Patient ambulated down hallway and back  Gait steady     Teri [place], [place]  07/20/23 5225

## 2023-07-20 NOTE — ED NOTES
PATIENT REVIEWED AND DISCHARGE FOR HOME. PERIPHERAL IV REMOVED, DISCHARGE PAPERS GIVEN TO RELATIVE. URINE SAMPLE SENT TO THE LAB.      PATIENT LEFT DEPT FOR HOME WITH RELATIVE     Anika Dee RN  07/20/23 1938

## 2023-07-20 NOTE — ED TRIAGE NOTES
Patient comes from home c/o pain all over A&Ox1 Hx: dementia.    By the time EMS arrived patient had no pain/ no complaints    Per family patient wakes up every day c/o pain all over

## 2023-07-20 NOTE — ED NOTES
PATIENT OBSERVED RESTING IN BED, RELATIVE AT BED SIDE, PATIENT AWAKE AND CONVERSING WITH RELATIVE. NO COMPLAINT AT THIS TIME. RELATIVE UPDATED THAT PATIENT HAS NO REPT BLOOD WORKS.       Eri Mckenzie RN  07/20/23 2221

## 2023-07-25 ENCOUNTER — HOSPITAL ENCOUNTER (EMERGENCY)
Facility: HOSPITAL | Age: 78
Discharge: HOME | End: 2023-07-25
Attending: STUDENT IN AN ORGANIZED HEALTH CARE EDUCATION/TRAINING PROGRAM
Payer: MEDICARE

## 2023-07-25 ENCOUNTER — APPOINTMENT (OUTPATIENT)
Facility: HOSPITAL | Age: 78
End: 2023-07-25
Payer: MEDICARE

## 2023-07-25 VITALS
OXYGEN SATURATION: 97 % | DIASTOLIC BLOOD PRESSURE: 70 MMHG | HEIGHT: 62 IN | TEMPERATURE: 97.8 F | HEART RATE: 65 BPM | BODY MASS INDEX: 30.36 KG/M2 | SYSTOLIC BLOOD PRESSURE: 148 MMHG | WEIGHT: 165 LBS | RESPIRATION RATE: 15 BRPM

## 2023-07-25 DIAGNOSIS — R53.1 GENERALIZED WEAKNESS: Primary | ICD-10-CM

## 2023-07-25 LAB
ANION GAP SERPL CALC-SCNC: 5 MMOL/L (ref 3–18)
BASOPHILS # BLD: 0 K/UL (ref 0–0.1)
BASOPHILS NFR BLD: 1 % (ref 0–2)
BUN SERPL-MCNC: 20 MG/DL (ref 7–18)
BUN/CREAT SERPL: 13 (ref 12–20)
CALCIUM SERPL-MCNC: 9 MG/DL (ref 8.5–10.1)
CHLORIDE SERPL-SCNC: 107 MMOL/L (ref 100–111)
CO2 SERPL-SCNC: 28 MMOL/L (ref 21–32)
CREAT SERPL-MCNC: 1.6 MG/DL (ref 0.6–1.3)
DIFFERENTIAL METHOD BLD: ABNORMAL
EOSINOPHIL # BLD: 0.2 K/UL (ref 0–0.4)
EOSINOPHIL NFR BLD: 3 % (ref 0–5)
ERYTHROCYTE [DISTWIDTH] IN BLOOD BY AUTOMATED COUNT: 13 % (ref 11.6–14.5)
GLUCOSE SERPL-MCNC: 215 MG/DL (ref 74–99)
HCT VFR BLD AUTO: 38.3 % (ref 36–48)
HGB BLD-MCNC: 12.6 G/DL (ref 13–16)
IMM GRANULOCYTES # BLD AUTO: 0 K/UL (ref 0–0.04)
IMM GRANULOCYTES NFR BLD AUTO: 0 % (ref 0–0.5)
LYMPHOCYTES # BLD: 1.4 K/UL (ref 0.9–3.6)
LYMPHOCYTES NFR BLD: 24 % (ref 21–52)
MAGNESIUM SERPL-MCNC: 2.3 MG/DL (ref 1.6–2.6)
MCH RBC QN AUTO: 28.8 PG (ref 24–34)
MCHC RBC AUTO-ENTMCNC: 32.9 G/DL (ref 31–37)
MCV RBC AUTO: 87.4 FL (ref 78–100)
MONOCYTES # BLD: 0.5 K/UL (ref 0.05–1.2)
MONOCYTES NFR BLD: 9 % (ref 3–10)
NEUTS SEG # BLD: 3.6 K/UL (ref 1.8–8)
NEUTS SEG NFR BLD: 63 % (ref 40–73)
NRBC # BLD: 0 K/UL (ref 0–0.01)
NRBC BLD-RTO: 0 PER 100 WBC
PLATELET # BLD AUTO: 132 K/UL (ref 135–420)
PMV BLD AUTO: 10.2 FL (ref 9.2–11.8)
POTASSIUM SERPL-SCNC: 4 MMOL/L (ref 3.5–5.5)
RBC # BLD AUTO: 4.38 M/UL (ref 4.35–5.65)
SODIUM SERPL-SCNC: 140 MMOL/L (ref 136–145)
TROPONIN I SERPL HS-MCNC: 13 NG/L (ref 0–78)
WBC # BLD AUTO: 5.7 K/UL (ref 4.6–13.2)

## 2023-07-25 PROCEDURE — 80048 BASIC METABOLIC PNL TOTAL CA: CPT

## 2023-07-25 PROCEDURE — 85025 COMPLETE CBC W/AUTO DIFF WBC: CPT

## 2023-07-25 PROCEDURE — 84484 ASSAY OF TROPONIN QUANT: CPT

## 2023-07-25 PROCEDURE — 93005 ELECTROCARDIOGRAM TRACING: CPT | Performed by: STUDENT IN AN ORGANIZED HEALTH CARE EDUCATION/TRAINING PROGRAM

## 2023-07-25 PROCEDURE — 71045 X-RAY EXAM CHEST 1 VIEW: CPT

## 2023-07-25 PROCEDURE — 83735 ASSAY OF MAGNESIUM: CPT

## 2023-07-25 PROCEDURE — 99285 EMERGENCY DEPT VISIT HI MDM: CPT

## 2023-07-26 LAB
EKG ATRIAL RATE: 59 BPM
EKG DIAGNOSIS: NORMAL
EKG P AXIS: 36 DEGREES
EKG P-R INTERVAL: 292 MS
EKG Q-T INTERVAL: 466 MS
EKG QRS DURATION: 88 MS
EKG QTC CALCULATION (BAZETT): 461 MS
EKG R AXIS: -33 DEGREES
EKG T AXIS: 10 DEGREES
EKG VENTRICULAR RATE: 59 BPM

## 2023-07-26 PROCEDURE — 93010 ELECTROCARDIOGRAM REPORT: CPT | Performed by: INTERNAL MEDICINE

## 2023-07-26 NOTE — ED PROVIDER NOTES
degrees    R Axis -33 degrees    T Axis 10 degrees    Diagnosis       Sinus bradycardia with 1st degree AV block  Left axis deviation  Minimal voltage criteria for LVH, may be normal variant ( R in aVL )  Abnormal ECG  When compared with ECG of 20-JUL-2023 05:56,  No significant change was found         Radiologic Studies -   XR CHEST 1 VIEW    (Results Pending)           Medical Decision Making   I am the first provider for this patient. I reviewed the vital signs, available nursing notes, past medical history, past surgical history, family history and social history. Vital Signs-Reviewed the patient's vital signs. EKG: Sinus bradycardia with first-degree AV block. No STEMI. Interpreted by me. ED Course: Progress Notes, Reevaluation, and Consults:    Provider Notes (Medical Decision Making):       MDM  Number of Diagnoses or Management Options  Generalized weakness  Diagnosis management comments: Patient is overall well-appearing. He is frustrated but in no acute distress. His vital signs are stable. He has no signs of trauma. Benign physical exam.  Screening labs obtained are grossly unremarkable. Troponin is negative. Unremarkable chest x-ray. Patient is at mental baseline. He is wanting to go home. Patient will be discharged at this time                Procedures          Diagnosis     Clinical Impression:   1. Generalized weakness        Disposition: discharged    Disclaimer: Sections of this note are dictated using utilizing voice recognition software. Minor typographical errors may be present. If questions arise, please do not hesitate to contact me or call our department.           Kar Armendariz DO  07/25/23 2042

## 2023-07-26 NOTE — ED NOTES
Patients son Ana Martino informed of patients care, he is concerned about his father and his memory, he lives about 90 minutes from here and cannot come here nor take him home, he verbalized trying to get his parents help in the home or getting them into a home.      Eliseo Pool RN  07/25/23 2118

## 2023-08-15 ENCOUNTER — APPOINTMENT (OUTPATIENT)
Facility: HOSPITAL | Age: 78
End: 2023-08-15
Payer: MEDICARE

## 2023-08-15 ENCOUNTER — HOSPITAL ENCOUNTER (EMERGENCY)
Facility: HOSPITAL | Age: 78
Discharge: HOME | End: 2023-08-15
Attending: EMERGENCY MEDICINE
Payer: MEDICARE

## 2023-08-15 VITALS
WEIGHT: 161 LBS | SYSTOLIC BLOOD PRESSURE: 139 MMHG | TEMPERATURE: 98 F | BODY MASS INDEX: 26.82 KG/M2 | OXYGEN SATURATION: 99 % | RESPIRATION RATE: 17 BRPM | DIASTOLIC BLOOD PRESSURE: 77 MMHG | HEART RATE: 69 BPM | HEIGHT: 65 IN

## 2023-08-15 DIAGNOSIS — R10.30 LOWER ABDOMINAL PAIN: ICD-10-CM

## 2023-08-15 DIAGNOSIS — E86.0 DEHYDRATION, MILD: Primary | ICD-10-CM

## 2023-08-15 LAB
ALBUMIN SERPL-MCNC: 3.6 G/DL (ref 3.4–5)
ALBUMIN/GLOB SERPL: 1.2 (ref 0.8–1.7)
ALP SERPL-CCNC: 86 U/L (ref 45–117)
ALT SERPL-CCNC: 31 U/L (ref 16–61)
ANION GAP SERPL CALC-SCNC: 5 MMOL/L (ref 3–18)
APPEARANCE UR: CLEAR
AST SERPL-CCNC: 22 U/L (ref 10–38)
BASOPHILS # BLD: 0.1 K/UL (ref 0–0.1)
BASOPHILS NFR BLD: 1 % (ref 0–2)
BILIRUB DIRECT SERPL-MCNC: 0.2 MG/DL (ref 0–0.2)
BILIRUB SERPL-MCNC: 0.6 MG/DL (ref 0.2–1)
BILIRUB UR QL: NEGATIVE
BUN SERPL-MCNC: 27 MG/DL (ref 7–18)
BUN/CREAT SERPL: 15 (ref 12–20)
CALCIUM SERPL-MCNC: 8.8 MG/DL (ref 8.5–10.1)
CHLORIDE SERPL-SCNC: 107 MMOL/L (ref 100–111)
CO2 SERPL-SCNC: 27 MMOL/L (ref 21–32)
COLOR UR: YELLOW
CREAT SERPL-MCNC: 1.75 MG/DL (ref 0.6–1.3)
DIFFERENTIAL METHOD BLD: ABNORMAL
EKG ATRIAL RATE: 59 BPM
EKG DIAGNOSIS: NORMAL
EKG P AXIS: 26 DEGREES
EKG P-R INTERVAL: 300 MS
EKG Q-T INTERVAL: 496 MS
EKG QRS DURATION: 82 MS
EKG QTC CALCULATION (BAZETT): 491 MS
EKG R AXIS: -38 DEGREES
EKG T AXIS: 23 DEGREES
EKG VENTRICULAR RATE: 59 BPM
EOSINOPHIL # BLD: 0.1 K/UL (ref 0–0.4)
EOSINOPHIL NFR BLD: 3 % (ref 0–5)
ERYTHROCYTE [DISTWIDTH] IN BLOOD BY AUTOMATED COUNT: 13.3 % (ref 11.6–14.5)
GLOBULIN SER CALC-MCNC: 3 G/DL (ref 2–4)
GLUCOSE SERPL-MCNC: 192 MG/DL (ref 74–99)
GLUCOSE UR STRIP.AUTO-MCNC: >1000 MG/DL
HCT VFR BLD AUTO: 38.9 % (ref 36–48)
HGB BLD-MCNC: 12.9 G/DL (ref 13–16)
HGB UR QL STRIP: NEGATIVE
IMM GRANULOCYTES # BLD AUTO: 0 K/UL (ref 0–0.04)
IMM GRANULOCYTES NFR BLD AUTO: 0 % (ref 0–0.5)
KETONES UR QL STRIP.AUTO: NEGATIVE MG/DL
LACTATE SERPL-SCNC: 1.1 MMOL/L (ref 0.4–2)
LEUKOCYTE ESTERASE UR QL STRIP.AUTO: NEGATIVE
LIPASE SERPL-CCNC: 323 U/L (ref 73–393)
LYMPHOCYTES # BLD: 0.9 K/UL (ref 0.9–3.6)
LYMPHOCYTES NFR BLD: 22 % (ref 21–52)
MCH RBC QN AUTO: 29.1 PG (ref 24–34)
MCHC RBC AUTO-ENTMCNC: 33.2 G/DL (ref 31–37)
MCV RBC AUTO: 87.6 FL (ref 78–100)
MONOCYTES # BLD: 0.5 K/UL (ref 0.05–1.2)
MONOCYTES NFR BLD: 11 % (ref 3–10)
NEUTS SEG # BLD: 2.8 K/UL (ref 1.8–8)
NEUTS SEG NFR BLD: 63 % (ref 40–73)
NITRITE UR QL STRIP.AUTO: NEGATIVE
NRBC # BLD: 0 K/UL (ref 0–0.01)
NRBC BLD-RTO: 0 PER 100 WBC
PH UR STRIP: 5.5 (ref 5–8)
PLATELET # BLD AUTO: 132 K/UL (ref 135–420)
PMV BLD AUTO: 10.6 FL (ref 9.2–11.8)
POTASSIUM SERPL-SCNC: 3.7 MMOL/L (ref 3.5–5.5)
PROT SERPL-MCNC: 6.6 G/DL (ref 6.4–8.2)
PROT UR STRIP-MCNC: NEGATIVE MG/DL
RBC # BLD AUTO: 4.44 M/UL (ref 4.35–5.65)
SODIUM SERPL-SCNC: 139 MMOL/L (ref 136–145)
SP GR UR REFRACTOMETRY: >1.03 (ref 1–1.03)
TROPONIN I SERPL HS-MCNC: 11 NG/L (ref 0–78)
TROPONIN I SERPL HS-MCNC: 11 NG/L (ref 0–78)
TSH SERPL DL<=0.05 MIU/L-ACNC: 2.86 UIU/ML (ref 0.36–3.74)
UROBILINOGEN UR QL STRIP.AUTO: 0.2 EU/DL (ref 0.2–1)
WBC # BLD AUTO: 4.4 K/UL (ref 4.6–13.2)

## 2023-08-15 PROCEDURE — 6360000004 HC RX CONTRAST MEDICATION: Performed by: STUDENT IN AN ORGANIZED HEALTH CARE EDUCATION/TRAINING PROGRAM

## 2023-08-15 PROCEDURE — 93005 ELECTROCARDIOGRAM TRACING: CPT | Performed by: STUDENT IN AN ORGANIZED HEALTH CARE EDUCATION/TRAINING PROGRAM

## 2023-08-15 PROCEDURE — 99285 EMERGENCY DEPT VISIT HI MDM: CPT

## 2023-08-15 PROCEDURE — 84443 ASSAY THYROID STIM HORMONE: CPT

## 2023-08-15 PROCEDURE — 83605 ASSAY OF LACTIC ACID: CPT

## 2023-08-15 PROCEDURE — 84484 ASSAY OF TROPONIN QUANT: CPT

## 2023-08-15 PROCEDURE — 81003 URINALYSIS AUTO W/O SCOPE: CPT

## 2023-08-15 PROCEDURE — 93010 ELECTROCARDIOGRAM REPORT: CPT | Performed by: INTERNAL MEDICINE

## 2023-08-15 PROCEDURE — 80076 HEPATIC FUNCTION PANEL: CPT

## 2023-08-15 PROCEDURE — 80048 BASIC METABOLIC PNL TOTAL CA: CPT

## 2023-08-15 PROCEDURE — 70450 CT HEAD/BRAIN W/O DYE: CPT

## 2023-08-15 PROCEDURE — 74177 CT ABD & PELVIS W/CONTRAST: CPT

## 2023-08-15 PROCEDURE — 6370000000 HC RX 637 (ALT 250 FOR IP): Performed by: STUDENT IN AN ORGANIZED HEALTH CARE EDUCATION/TRAINING PROGRAM

## 2023-08-15 PROCEDURE — 96361 HYDRATE IV INFUSION ADD-ON: CPT

## 2023-08-15 PROCEDURE — 2580000003 HC RX 258: Performed by: STUDENT IN AN ORGANIZED HEALTH CARE EDUCATION/TRAINING PROGRAM

## 2023-08-15 PROCEDURE — 96360 HYDRATION IV INFUSION INIT: CPT

## 2023-08-15 PROCEDURE — 83690 ASSAY OF LIPASE: CPT

## 2023-08-15 PROCEDURE — 71045 X-RAY EXAM CHEST 1 VIEW: CPT

## 2023-08-15 PROCEDURE — 85025 COMPLETE CBC W/AUTO DIFF WBC: CPT

## 2023-08-15 RX ORDER — ACETAMINOPHEN 500 MG
1000 TABLET ORAL
Status: COMPLETED | OUTPATIENT
Start: 2023-08-15 | End: 2023-08-15

## 2023-08-15 RX ORDER — IODIXANOL 320 MG/ML
80 INJECTION, SOLUTION INTRAVASCULAR
Status: COMPLETED | OUTPATIENT
Start: 2023-08-15 | End: 2023-08-15

## 2023-08-15 RX ORDER — 0.9 % SODIUM CHLORIDE 0.9 %
500 INTRAVENOUS SOLUTION INTRAVENOUS ONCE
Status: DISCONTINUED | OUTPATIENT
Start: 2023-08-15 | End: 2023-08-15 | Stop reason: HOSPADM

## 2023-08-15 RX ORDER — 0.9 % SODIUM CHLORIDE 0.9 %
1000 INTRAVENOUS SOLUTION INTRAVENOUS ONCE
Status: DISCONTINUED | OUTPATIENT
Start: 2023-08-15 | End: 2023-08-15

## 2023-08-15 RX ORDER — 0.9 % SODIUM CHLORIDE 0.9 %
500 INTRAVENOUS SOLUTION INTRAVENOUS ONCE
Status: COMPLETED | OUTPATIENT
Start: 2023-08-15 | End: 2023-08-15

## 2023-08-15 RX ADMIN — SODIUM CHLORIDE 500 ML: 9 INJECTION, SOLUTION INTRAVENOUS at 11:11

## 2023-08-15 RX ADMIN — ACETAMINOPHEN 1000 MG: 500 TABLET ORAL at 11:56

## 2023-08-15 RX ADMIN — IODIXANOL 80 ML: 320 INJECTION, SOLUTION INTRAVASCULAR at 10:49

## 2023-08-15 ASSESSMENT — PAIN - FUNCTIONAL ASSESSMENT
PAIN_FUNCTIONAL_ASSESSMENT: NONE - DENIES PAIN

## 2023-08-15 ASSESSMENT — PAIN DESCRIPTION - ORIENTATION: ORIENTATION: RIGHT;LEFT

## 2023-08-15 ASSESSMENT — PAIN DESCRIPTION - DESCRIPTORS: DESCRIPTORS: CRAMPING

## 2023-08-15 ASSESSMENT — PAIN DESCRIPTION - LOCATION: LOCATION: LEG

## 2023-08-15 ASSESSMENT — PAIN SCALES - GENERAL: PAINLEVEL_OUTOF10: 6

## 2023-08-15 NOTE — DISCHARGE INSTRUCTIONS
Please read all discharge paperwork instructions. Please take all previously prescribed medications as prescribed. If develop any new, worsening, or concerning symptoms, then please return to the emergency room. Otherwise please ensure that you follow-up with your primary care provider within the next few days. If you have any difficulty passing your urine, and you feel that you are unable to completely empty your bladder, then please return to the emergency room as you may need to have a temporary catheter placed or follow-up with urology as an outpatient.

## 2023-08-15 NOTE — ED PROVIDER NOTES
usable history, we will go ahead and get broad work-up going, including lactate, abdominal labs, as well as CT of the abdomen, and CT of the head. Amount and/or Complexity of Data Reviewed  Labs: ordered. Decision-making details documented in ED Course. Radiology: ordered. ECG/medicine tests: ordered. Risk  OTC drugs. Prescription drug management. REASSESSMENT     ED Course as of 08/15/23 1436   Tue Aug 15, 2023   1003 Chest x-ray independently interpreted by me: No acute findings seen. [SH]   1012 EKG independently interpreted by me: Sinus rhythm, rate of 59, left axis deviation, prolonged RI interval, no LVH, no acute ST segment changes, prolonged QTc. [SH]   1022 Hemoglobin Quant(!): 12.9  Appears to be near patient's baseline. [SH]   1026 BUN,BUNPL(!): 27 [SH]   1026 Creatinine(!): 1.75  Slight bump in BUN and creatinine above baseline, suspect mild dehydration. [SH]   1027 Troponin, High Sensitivity: 11 [SH]   1028 Lactic Acid, Plasma: 1.1 [SH]   1148 Large bladder on CT scan, will go ahead and get a cath specimen at this time. [SH]   1155 Specific Gravity, UA(!): >1.030  Appears to have concentrated urine, consistent with possible dehydration seen on BMP. Given 500cc bolus, will give additional 500 cc NS [SH]   2697 Chest x-ray, CT scan of the head, and CT of the abdomen pelvis without any acute findings. Chronic changes present. Suspect patient's likely abdominal discomfort was from his bladder distention.   [SH]   1211 Patient reevaluated, feels significantly better, no longer having abdominal tenderness. Some concern for urinary retention. However patient is requesting to leave at this time. Discussed with patient about need to ensure he is urinating frequently, as well as need for follow-up with primary care, to possibly get started on a medication like tamsulosin, or urology follow-up.   Do not believe patient requires a Miguel at this time, and patient would decline it

## 2023-08-15 NOTE — ED TRIAGE NOTES
4519M - Patient came in via EMS due to unresponsiveness. As per EMS personnel, patient's family said that patient told to them \"take me to the doctor or else I will not make it\". Also, patient's pupils were pinpoint and non-reactive to light. Blood glucose = 190.    0915H - patient assessed responsive to painful stimuli with incomprehensible sound. Pupils both 2mm PERRLA.    [id number] - patient now awake, and responsive to questions and claiming to have lower abdominal pain when touched.

## 2023-08-20 ENCOUNTER — HOSPITAL ENCOUNTER (EMERGENCY)
Facility: HOSPITAL | Age: 78
Discharge: HOME | End: 2023-08-20
Attending: STUDENT IN AN ORGANIZED HEALTH CARE EDUCATION/TRAINING PROGRAM
Payer: MEDICARE

## 2023-08-20 ENCOUNTER — APPOINTMENT (OUTPATIENT)
Facility: HOSPITAL | Age: 78
End: 2023-08-20
Payer: MEDICARE

## 2023-08-20 VITALS
TEMPERATURE: 97.7 F | WEIGHT: 161 LBS | RESPIRATION RATE: 15 BRPM | HEART RATE: 63 BPM | HEIGHT: 65 IN | SYSTOLIC BLOOD PRESSURE: 140 MMHG | BODY MASS INDEX: 26.82 KG/M2 | DIASTOLIC BLOOD PRESSURE: 69 MMHG | OXYGEN SATURATION: 97 %

## 2023-08-20 DIAGNOSIS — R07.9 CHEST PAIN, UNSPECIFIED TYPE: Primary | ICD-10-CM

## 2023-08-20 LAB
ALBUMIN SERPL-MCNC: 3.6 G/DL (ref 3.4–5)
ALBUMIN/GLOB SERPL: 1.2 (ref 0.8–1.7)
ALP SERPL-CCNC: 93 U/L (ref 45–117)
ALT SERPL-CCNC: 34 U/L (ref 16–61)
ANION GAP SERPL CALC-SCNC: 8 MMOL/L (ref 3–18)
APPEARANCE UR: CLEAR
AST SERPL-CCNC: 20 U/L (ref 10–38)
BASOPHILS # BLD: 0 K/UL (ref 0–0.1)
BASOPHILS NFR BLD: 1 % (ref 0–2)
BILIRUB SERPL-MCNC: 0.5 MG/DL (ref 0.2–1)
BILIRUB UR QL: NEGATIVE
BUN SERPL-MCNC: 23 MG/DL (ref 7–18)
BUN/CREAT SERPL: 14 (ref 12–20)
CALCIUM SERPL-MCNC: 8.5 MG/DL (ref 8.5–10.1)
CHLORIDE SERPL-SCNC: 107 MMOL/L (ref 100–111)
CO2 SERPL-SCNC: 25 MMOL/L (ref 21–32)
COLOR UR: YELLOW
CREAT SERPL-MCNC: 1.68 MG/DL (ref 0.6–1.3)
DIFFERENTIAL METHOD BLD: ABNORMAL
EKG ATRIAL RATE: 56 BPM
EKG DIAGNOSIS: NORMAL
EKG P-R INTERVAL: 352 MS
EKG Q-T INTERVAL: 486 MS
EKG QRS DURATION: 92 MS
EKG QTC CALCULATION (BAZETT): 468 MS
EKG R AXIS: -46 DEGREES
EKG T AXIS: -5 DEGREES
EKG VENTRICULAR RATE: 56 BPM
EOSINOPHIL # BLD: 0.2 K/UL (ref 0–0.4)
EOSINOPHIL NFR BLD: 4 % (ref 0–5)
ERYTHROCYTE [DISTWIDTH] IN BLOOD BY AUTOMATED COUNT: 13.2 % (ref 11.6–14.5)
GLOBULIN SER CALC-MCNC: 3 G/DL (ref 2–4)
GLUCOSE SERPL-MCNC: 140 MG/DL (ref 74–99)
GLUCOSE UR STRIP.AUTO-MCNC: >1000 MG/DL
HCT VFR BLD AUTO: 35.6 % (ref 36–48)
HGB BLD-MCNC: 12.2 G/DL (ref 13–16)
HGB UR QL STRIP: NEGATIVE
IMM GRANULOCYTES # BLD AUTO: 0 K/UL (ref 0–0.04)
IMM GRANULOCYTES NFR BLD AUTO: 0 % (ref 0–0.5)
KETONES UR QL STRIP.AUTO: NEGATIVE MG/DL
LEUKOCYTE ESTERASE UR QL STRIP.AUTO: NEGATIVE
LIPASE SERPL-CCNC: 286 U/L (ref 73–393)
LYMPHOCYTES # BLD: 1.5 K/UL (ref 0.9–3.6)
LYMPHOCYTES NFR BLD: 28 % (ref 21–52)
MCH RBC QN AUTO: 29.6 PG (ref 24–34)
MCHC RBC AUTO-ENTMCNC: 34.3 G/DL (ref 31–37)
MCV RBC AUTO: 86.4 FL (ref 78–100)
MONOCYTES # BLD: 0.5 K/UL (ref 0.05–1.2)
MONOCYTES NFR BLD: 10 % (ref 3–10)
NEUTS SEG # BLD: 3 K/UL (ref 1.8–8)
NEUTS SEG NFR BLD: 57 % (ref 40–73)
NITRITE UR QL STRIP.AUTO: NEGATIVE
NRBC # BLD: 0 K/UL (ref 0–0.01)
NRBC BLD-RTO: 0 PER 100 WBC
NT PRO BNP: 69 PG/ML (ref 0–1800)
PH UR STRIP: 5.5 (ref 5–8)
PLATELET # BLD AUTO: 128 K/UL (ref 135–420)
PMV BLD AUTO: 10.6 FL (ref 9.2–11.8)
POTASSIUM SERPL-SCNC: 3.4 MMOL/L (ref 3.5–5.5)
PROT SERPL-MCNC: 6.6 G/DL (ref 6.4–8.2)
PROT UR STRIP-MCNC: NEGATIVE MG/DL
RBC # BLD AUTO: 4.12 M/UL (ref 4.35–5.65)
SODIUM SERPL-SCNC: 140 MMOL/L (ref 136–145)
SP GR UR REFRACTOMETRY: 1.03 (ref 1–1.03)
TROPONIN I SERPL HS-MCNC: 13 NG/L (ref 0–78)
TROPONIN I SERPL HS-MCNC: 13 NG/L (ref 0–78)
UROBILINOGEN UR QL STRIP.AUTO: 0.2 EU/DL (ref 0.2–1)
WBC # BLD AUTO: 5.3 K/UL (ref 4.6–13.2)

## 2023-08-20 PROCEDURE — 85025 COMPLETE CBC W/AUTO DIFF WBC: CPT

## 2023-08-20 PROCEDURE — 71045 X-RAY EXAM CHEST 1 VIEW: CPT

## 2023-08-20 PROCEDURE — 93010 ELECTROCARDIOGRAM REPORT: CPT | Performed by: INTERNAL MEDICINE

## 2023-08-20 PROCEDURE — 99285 EMERGENCY DEPT VISIT HI MDM: CPT

## 2023-08-20 PROCEDURE — 83880 ASSAY OF NATRIURETIC PEPTIDE: CPT

## 2023-08-20 PROCEDURE — 93005 ELECTROCARDIOGRAM TRACING: CPT | Performed by: STUDENT IN AN ORGANIZED HEALTH CARE EDUCATION/TRAINING PROGRAM

## 2023-08-20 PROCEDURE — 83690 ASSAY OF LIPASE: CPT

## 2023-08-20 PROCEDURE — 81003 URINALYSIS AUTO W/O SCOPE: CPT

## 2023-08-20 PROCEDURE — 84484 ASSAY OF TROPONIN QUANT: CPT

## 2023-08-20 PROCEDURE — 80053 COMPREHEN METABOLIC PANEL: CPT

## 2023-08-20 ASSESSMENT — ENCOUNTER SYMPTOMS
ABDOMINAL PAIN: 0
FACIAL SWELLING: 0
CHEST TIGHTNESS: 0
BLOOD IN STOOL: 0
CONSTIPATION: 0
DIARRHEA: 0
BACK PAIN: 0
SHORTNESS OF BREATH: 0
EYE PAIN: 0
ABDOMINAL DISTENTION: 0

## 2023-08-20 NOTE — ED PROVIDER NOTES
I received the patient in turnover from Dr. Lily Allred at the end of his shift. The patient is an 73-year-old male with past medical history significant for dementia, who was brought to the ED today by EMS after complaining of chest pain to his wife at home.     Recent Results (from the past 24 hour(s))   CMP    Collection Time: 08/20/23 12:45 AM   Result Value Ref Range    Sodium 140 136 - 145 mmol/L    Potassium 3.4 (L) 3.5 - 5.5 mmol/L    Chloride 107 100 - 111 mmol/L    CO2 25 21 - 32 mmol/L    Anion Gap 8 3.0 - 18 mmol/L    Glucose 140 (H) 74 - 99 mg/dL    BUN 23 (H) 7.0 - 18 MG/DL    Creatinine 1.68 (H) 0.6 - 1.3 MG/DL    Bun/Cre Ratio 14 12 - 20      Est, Glom Filt Rate 41 (L) >60 ml/min/1.73m2    Calcium 8.5 8.5 - 10.1 MG/DL    Total Bilirubin 0.5 0.2 - 1.0 MG/DL    ALT 34 16 - 61 U/L    AST 20 10 - 38 U/L    Alk Phosphatase 93 45 - 117 U/L    Total Protein 6.6 6.4 - 8.2 g/dL    Albumin 3.6 3.4 - 5.0 g/dL    Globulin 3.0 2.0 - 4.0 g/dL    Albumin/Globulin Ratio 1.2 0.8 - 1.7     Lipase    Collection Time: 08/20/23 12:45 AM   Result Value Ref Range    Lipase 286 73 - 393 U/L   CBC with Auto Differential    Collection Time: 08/20/23  4:45 AM   Result Value Ref Range    WBC 5.3 4.6 - 13.2 K/uL    RBC 4.12 (L) 4.35 - 5.65 M/uL    Hemoglobin 12.2 (L) 13.0 - 16.0 g/dL    Hematocrit 35.6 (L) 36.0 - 48.0 %    MCV 86.4 78.0 - 100.0 FL    MCH 29.6 24.0 - 34.0 PG    MCHC 34.3 31.0 - 37.0 g/dL    RDW 13.2 11.6 - 14.5 %    Platelets 319 (L) 427 - 420 K/uL    MPV 10.6 9.2 - 11.8 FL    Nucleated RBCs 0.0 0  WBC    nRBC 0.00 0.00 - 0.01 K/uL    Neutrophils % 57 40 - 73 %    Lymphocytes % 28 21 - 52 %    Monocytes % 10 3 - 10 %    Eosinophils % 4 0 - 5 %    Basophils % 1 0 - 2 %    Immature Granulocytes 0 0.0 - 0.5 %    Neutrophils Absolute 3.0 1.8 - 8.0 K/UL    Lymphocytes Absolute 1.5 0.9 - 3.6 K/UL    Monocytes Absolute 0.5 0.05 - 1.2 K/UL    Eosinophils Absolute 0.2 0.0 - 0.4 K/UL    Basophils Absolute 0.0 0.0 - 0.1

## 2023-08-20 NOTE — H&P
EMERGENCY DEPARTMENT HISTORY AND PHYSICAL EXAM    3:00 PM      Date: 8/20/2023  Patient Name: Stone Borjas    History of Presenting Illness     Chief Complaint   Patient presents with    Chest Pain       History From: Patient and patients wife   HPI  78-year-old male with history of CABG, aortic valve replacement, hypertension, hyperlipidemia, type 2 diabetes who presents with chest pain. As per patient earlier today patient had chest pain. Located in the sternum, mild in nature, intermittent. No aggravating or alleviating factors. Patient denies any chest pain at this time. When assessing ROS he has no shortness of breath, lower extremity swelling, fevers, hemoptysis, or any other changes. Nursing Notes were all reviewed and agreed with or any disagreements were addressed in the HPI. PCP: Alanna Wayne MD    No current facility-administered medications for this encounter.      Current Outpatient Medications   Medication Sig Dispense Refill    omeprazole (PRILOSEC) 20 MG delayed release capsule Take 2 capsules by mouth Daily 60 capsule 0    MAGNESIUM PO Take 1 tablet by mouth daily      Apoaequorin (PREVAGEN) 10 MG CAPS as directed      B Complex Vitamins (VITAMIN B COMPLEX 100 IJ) as directed      Multiple Vitamins-Minerals (MULTIVITAMIN ADULT EXTRA C PO) 1 tablet      rosuvastatin (CRESTOR) 10 MG tablet Take 10 mg by mouth daily      memantine (NAMENDA) 5 MG tablet Take 2 tablets by mouth daily 60 tablet 5    donepezil (ARICEPT) 10 MG tablet Take 1 tablet by mouth nightly 30 tablet 5    Inulin (FIBER CHOICE PO) Take 1 tablet by mouth daily      aspirin 81 MG EC tablet Take 81 mg by mouth daily      empagliflozin (JARDIANCE) 25 MG tablet Take 25 mg by mouth daily      glimepiride (AMARYL) 4 MG tablet Take 4 mg by mouth      metoprolol tartrate (LOPRESSOR) 25 MG tablet Take 12.5 mg by mouth 2 times daily         Past History     Past Medical History:  Past Medical History:   Diagnosis Date

## 2023-08-20 NOTE — ED TRIAGE NOTES
Per EMS , patient complains of chest pain . EMS was called by the son. Patient no longer complains of chest pain at this time. Wife at the bedside.

## 2023-08-20 NOTE — ED NOTES
Son to other patient's rooms, calling for RN who is assisting other patients. Reporting patient needing to urinate, urinal at bedside, son would not hand to father. RN assists patient with urinal, son left. Patient denies pain, reports ready to go home. Reviewed D/C papers with son & patient prior to son leaving. RN [place] [facility] [place] [address].        Gay, [address] [place] [address]  08/20/23 3504

## 2023-08-22 ENCOUNTER — HOSPITAL ENCOUNTER (EMERGENCY)
Facility: HOSPITAL | Age: 78
Discharge: HOME | End: 2023-08-23
Attending: EMERGENCY MEDICINE
Payer: MEDICARE

## 2023-08-22 ENCOUNTER — APPOINTMENT (OUTPATIENT)
Facility: HOSPITAL | Age: 78
End: 2023-08-22
Payer: MEDICARE

## 2023-08-22 VITALS
TEMPERATURE: 97.9 F | HEART RATE: 69 BPM | SYSTOLIC BLOOD PRESSURE: 121 MMHG | OXYGEN SATURATION: 98 % | DIASTOLIC BLOOD PRESSURE: 58 MMHG | RESPIRATION RATE: 17 BRPM

## 2023-08-22 DIAGNOSIS — R73.9 HYPERGLYCEMIA: Primary | ICD-10-CM

## 2023-08-22 DIAGNOSIS — E87.6 HYPOKALEMIA: ICD-10-CM

## 2023-08-22 DIAGNOSIS — F03.C0 SEVERE DEMENTIA, UNSPECIFIED DEMENTIA TYPE, UNSPECIFIED WHETHER BEHAVIORAL, PSYCHOTIC, OR MOOD DISTURBANCE OR ANXIETY (HCC): ICD-10-CM

## 2023-08-22 LAB
BASOPHILS # BLD: 0 K/UL (ref 0–0.1)
BASOPHILS NFR BLD: 1 % (ref 0–2)
DIFFERENTIAL METHOD BLD: ABNORMAL
EOSINOPHIL # BLD: 0.2 K/UL (ref 0–0.4)
EOSINOPHIL NFR BLD: 4 % (ref 0–5)
ERYTHROCYTE [DISTWIDTH] IN BLOOD BY AUTOMATED COUNT: 13.5 % (ref 11.6–14.5)
GLUCOSE BLD STRIP.AUTO-MCNC: 426 MG/DL (ref 70–110)
GLUCOSE BLD STRIP.AUTO-MCNC: 445 MG/DL (ref 70–110)
HCT VFR BLD AUTO: 36.8 % (ref 36–48)
HGB BLD-MCNC: 12.2 G/DL (ref 13–16)
IMM GRANULOCYTES # BLD AUTO: 0 K/UL (ref 0–0.04)
IMM GRANULOCYTES NFR BLD AUTO: 0 % (ref 0–0.5)
LYMPHOCYTES # BLD: 1.5 K/UL (ref 0.9–3.6)
LYMPHOCYTES NFR BLD: 30 % (ref 21–52)
MCH RBC QN AUTO: 29.3 PG (ref 24–34)
MCHC RBC AUTO-ENTMCNC: 33.2 G/DL (ref 31–37)
MCV RBC AUTO: 88.5 FL (ref 78–100)
MONOCYTES # BLD: 0.6 K/UL (ref 0.05–1.2)
MONOCYTES NFR BLD: 12 % (ref 3–10)
NEUTS SEG # BLD: 2.7 K/UL (ref 1.8–8)
NEUTS SEG NFR BLD: 53 % (ref 40–73)
NRBC # BLD: 0 K/UL (ref 0–0.01)
NRBC BLD-RTO: 0 PER 100 WBC
PLATELET # BLD AUTO: 129 K/UL (ref 135–420)
PMV BLD AUTO: 10.9 FL (ref 9.2–11.8)
RBC # BLD AUTO: 4.16 M/UL (ref 4.35–5.65)
TROPONIN I SERPL HS-MCNC: 14 NG/L (ref 0–78)
WBC # BLD AUTO: 5 K/UL (ref 4.6–13.2)

## 2023-08-22 PROCEDURE — 96374 THER/PROPH/DIAG INJ IV PUSH: CPT

## 2023-08-22 PROCEDURE — 71045 X-RAY EXAM CHEST 1 VIEW: CPT

## 2023-08-22 PROCEDURE — 84484 ASSAY OF TROPONIN QUANT: CPT

## 2023-08-22 PROCEDURE — 80053 COMPREHEN METABOLIC PANEL: CPT

## 2023-08-22 PROCEDURE — 85025 COMPLETE CBC W/AUTO DIFF WBC: CPT

## 2023-08-22 PROCEDURE — 83735 ASSAY OF MAGNESIUM: CPT

## 2023-08-22 PROCEDURE — 93005 ELECTROCARDIOGRAM TRACING: CPT | Performed by: PHYSICIAN ASSISTANT

## 2023-08-22 PROCEDURE — 96361 HYDRATE IV INFUSION ADD-ON: CPT

## 2023-08-22 PROCEDURE — 82962 GLUCOSE BLOOD TEST: CPT

## 2023-08-22 PROCEDURE — 99285 EMERGENCY DEPT VISIT HI MDM: CPT

## 2023-08-23 ENCOUNTER — APPOINTMENT (OUTPATIENT)
Facility: HOSPITAL | Age: 78
End: 2023-08-23
Payer: MEDICARE

## 2023-08-23 VITALS
RESPIRATION RATE: 14 BRPM | BODY MASS INDEX: 26.82 KG/M2 | DIASTOLIC BLOOD PRESSURE: 106 MMHG | TEMPERATURE: 98.4 F | SYSTOLIC BLOOD PRESSURE: 144 MMHG | OXYGEN SATURATION: 98 % | WEIGHT: 161 LBS | HEART RATE: 59 BPM | HEIGHT: 65 IN

## 2023-08-23 DIAGNOSIS — R40.4 TRANSIENT ALTERATION OF AWARENESS: Primary | ICD-10-CM

## 2023-08-23 DIAGNOSIS — F03.90 DEMENTIA, UNSPECIFIED DEMENTIA SEVERITY, UNSPECIFIED DEMENTIA TYPE, UNSPECIFIED WHETHER BEHAVIORAL, PSYCHOTIC, OR MOOD DISTURBANCE OR ANXIETY (HCC): ICD-10-CM

## 2023-08-23 DIAGNOSIS — E86.0 DEHYDRATION: ICD-10-CM

## 2023-08-23 DIAGNOSIS — R73.9 HYPERGLYCEMIA: ICD-10-CM

## 2023-08-23 LAB
ALBUMIN SERPL-MCNC: 3.5 G/DL (ref 3.4–5)
ALBUMIN SERPL-MCNC: 3.6 G/DL (ref 3.4–5)
ALBUMIN/GLOB SERPL: 1.2 (ref 0.8–1.7)
ALBUMIN/GLOB SERPL: 1.2 (ref 0.8–1.7)
ALP SERPL-CCNC: 109 U/L (ref 45–117)
ALP SERPL-CCNC: 121 U/L (ref 45–117)
ALT SERPL-CCNC: 33 U/L (ref 16–61)
ALT SERPL-CCNC: 34 U/L (ref 16–61)
ANION GAP SERPL CALC-SCNC: 11 MMOL/L (ref 3–18)
ANION GAP SERPL CALC-SCNC: 6 MMOL/L (ref 3–18)
APPEARANCE UR: CLEAR
APPEARANCE UR: CLEAR
AST SERPL-CCNC: 21 U/L (ref 10–38)
AST SERPL-CCNC: 25 U/L (ref 10–38)
BASOPHILS # BLD: 0 K/UL (ref 0–0.1)
BASOPHILS NFR BLD: 1 % (ref 0–2)
BILIRUB DIRECT SERPL-MCNC: 0.2 MG/DL (ref 0–0.2)
BILIRUB SERPL-MCNC: 0.4 MG/DL (ref 0.2–1)
BILIRUB SERPL-MCNC: 0.4 MG/DL (ref 0.2–1)
BILIRUB UR QL: NEGATIVE
BILIRUB UR QL: NEGATIVE
BUN SERPL-MCNC: 21 MG/DL (ref 7–18)
BUN SERPL-MCNC: 26 MG/DL (ref 7–18)
BUN/CREAT SERPL: 11 (ref 12–20)
BUN/CREAT SERPL: 14 (ref 12–20)
CALCIUM SERPL-MCNC: 8.5 MG/DL (ref 8.5–10.1)
CALCIUM SERPL-MCNC: 8.6 MG/DL (ref 8.5–10.1)
CHLORIDE SERPL-SCNC: 103 MMOL/L (ref 100–111)
CHLORIDE SERPL-SCNC: 109 MMOL/L (ref 100–111)
CO2 SERPL-SCNC: 23 MMOL/L (ref 21–32)
CO2 SERPL-SCNC: 24 MMOL/L (ref 21–32)
COLOR UR: YELLOW
COLOR UR: YELLOW
CREAT SERPL-MCNC: 1.92 MG/DL (ref 0.6–1.3)
CREAT SERPL-MCNC: 1.92 MG/DL (ref 0.6–1.3)
DIFFERENTIAL METHOD BLD: ABNORMAL
EKG ATRIAL RATE: 57 BPM
EKG ATRIAL RATE: 58 BPM
EKG DIAGNOSIS: NORMAL
EKG DIAGNOSIS: NORMAL
EKG P AXIS: 59 DEGREES
EKG P AXIS: 8 DEGREES
EKG P-R INTERVAL: 306 MS
EKG P-R INTERVAL: 306 MS
EKG Q-T INTERVAL: 452 MS
EKG Q-T INTERVAL: 486 MS
EKG QRS DURATION: 90 MS
EKG QRS DURATION: 98 MS
EKG QTC CALCULATION (BAZETT): 443 MS
EKG QTC CALCULATION (BAZETT): 473 MS
EKG R AXIS: -31 DEGREES
EKG R AXIS: -38 DEGREES
EKG T AXIS: 23 DEGREES
EKG T AXIS: 35 DEGREES
EKG VENTRICULAR RATE: 57 BPM
EKG VENTRICULAR RATE: 58 BPM
EOSINOPHIL # BLD: 0.2 K/UL (ref 0–0.4)
EOSINOPHIL NFR BLD: 3 % (ref 0–5)
ERYTHROCYTE [DISTWIDTH] IN BLOOD BY AUTOMATED COUNT: 13.5 % (ref 11.6–14.5)
GLOBULIN SER CALC-MCNC: 3 G/DL (ref 2–4)
GLOBULIN SER CALC-MCNC: 3 G/DL (ref 2–4)
GLUCOSE BLD STRIP.AUTO-MCNC: 248 MG/DL (ref 70–110)
GLUCOSE BLD STRIP.AUTO-MCNC: 250 MG/DL (ref 70–110)
GLUCOSE BLD STRIP.AUTO-MCNC: 359 MG/DL (ref 70–110)
GLUCOSE SERPL-MCNC: 340 MG/DL (ref 74–99)
GLUCOSE SERPL-MCNC: 421 MG/DL (ref 74–99)
GLUCOSE UR STRIP.AUTO-MCNC: >1000 MG/DL
GLUCOSE UR STRIP.AUTO-MCNC: >1000 MG/DL
HCT VFR BLD AUTO: 35.6 % (ref 36–48)
HGB BLD-MCNC: 11.8 G/DL (ref 13–16)
HGB UR QL STRIP: NEGATIVE
HGB UR QL STRIP: NEGATIVE
IMM GRANULOCYTES # BLD AUTO: 0 K/UL (ref 0–0.04)
IMM GRANULOCYTES NFR BLD AUTO: 0 % (ref 0–0.5)
KETONES UR QL STRIP.AUTO: NEGATIVE MG/DL
KETONES UR QL STRIP.AUTO: NEGATIVE MG/DL
LACTATE SERPL-SCNC: 2.3 MMOL/L (ref 0.4–2)
LACTATE SERPL-SCNC: 2.7 MMOL/L (ref 0.4–2)
LEUKOCYTE ESTERASE UR QL STRIP.AUTO: NEGATIVE
LEUKOCYTE ESTERASE UR QL STRIP.AUTO: NEGATIVE
LYMPHOCYTES # BLD: 0.9 K/UL (ref 0.9–3.6)
LYMPHOCYTES NFR BLD: 19 % (ref 21–52)
MAGNESIUM SERPL-MCNC: 2.3 MG/DL (ref 1.6–2.6)
MAGNESIUM SERPL-MCNC: 2.4 MG/DL (ref 1.6–2.6)
MCH RBC QN AUTO: 29.6 PG (ref 24–34)
MCHC RBC AUTO-ENTMCNC: 33.1 G/DL (ref 31–37)
MCV RBC AUTO: 89.2 FL (ref 78–100)
MONOCYTES # BLD: 0.4 K/UL (ref 0.05–1.2)
MONOCYTES NFR BLD: 8 % (ref 3–10)
NEUTS SEG # BLD: 3.3 K/UL (ref 1.8–8)
NEUTS SEG NFR BLD: 68 % (ref 40–73)
NITRITE UR QL STRIP.AUTO: NEGATIVE
NITRITE UR QL STRIP.AUTO: NEGATIVE
NRBC # BLD: 0 K/UL (ref 0–0.01)
NRBC BLD-RTO: 0 PER 100 WBC
PH UR STRIP: 5.5 (ref 5–8)
PH UR STRIP: 5.5 (ref 5–8)
PLATELET # BLD AUTO: 117 K/UL (ref 135–420)
PMV BLD AUTO: 10.7 FL (ref 9.2–11.8)
POTASSIUM SERPL-SCNC: 3.2 MMOL/L (ref 3.5–5.5)
POTASSIUM SERPL-SCNC: 4.1 MMOL/L (ref 3.5–5.5)
PROCALCITONIN SERPL-MCNC: 0.05 NG/ML
PROT SERPL-MCNC: 6.5 G/DL (ref 6.4–8.2)
PROT SERPL-MCNC: 6.6 G/DL (ref 6.4–8.2)
PROT UR STRIP-MCNC: NEGATIVE MG/DL
PROT UR STRIP-MCNC: NEGATIVE MG/DL
RBC # BLD AUTO: 3.99 M/UL (ref 4.35–5.65)
SODIUM SERPL-SCNC: 137 MMOL/L (ref 136–145)
SODIUM SERPL-SCNC: 139 MMOL/L (ref 136–145)
SP GR UR REFRACTOMETRY: 1.03 (ref 1–1.03)
SP GR UR REFRACTOMETRY: >1.03 (ref 1–1.03)
TROPONIN I SERPL HS-MCNC: 12 NG/L (ref 0–78)
UROBILINOGEN UR QL STRIP.AUTO: 0.2 EU/DL (ref 0.2–1)
UROBILINOGEN UR QL STRIP.AUTO: 0.2 EU/DL (ref 0.2–1)
WBC # BLD AUTO: 4.9 K/UL (ref 4.6–13.2)

## 2023-08-23 PROCEDURE — 81003 URINALYSIS AUTO W/O SCOPE: CPT

## 2023-08-23 PROCEDURE — 2580000003 HC RX 258: Performed by: STUDENT IN AN ORGANIZED HEALTH CARE EDUCATION/TRAINING PROGRAM

## 2023-08-23 PROCEDURE — 82962 GLUCOSE BLOOD TEST: CPT

## 2023-08-23 PROCEDURE — 93010 ELECTROCARDIOGRAM REPORT: CPT | Performed by: INTERNAL MEDICINE

## 2023-08-23 PROCEDURE — 84484 ASSAY OF TROPONIN QUANT: CPT

## 2023-08-23 PROCEDURE — 83735 ASSAY OF MAGNESIUM: CPT

## 2023-08-23 PROCEDURE — 93005 ELECTROCARDIOGRAM TRACING: CPT | Performed by: STUDENT IN AN ORGANIZED HEALTH CARE EDUCATION/TRAINING PROGRAM

## 2023-08-23 PROCEDURE — 71045 X-RAY EXAM CHEST 1 VIEW: CPT

## 2023-08-23 PROCEDURE — 85025 COMPLETE CBC W/AUTO DIFF WBC: CPT

## 2023-08-23 PROCEDURE — 2580000003 HC RX 258: Performed by: EMERGENCY MEDICINE

## 2023-08-23 PROCEDURE — 83605 ASSAY OF LACTIC ACID: CPT

## 2023-08-23 PROCEDURE — 6370000000 HC RX 637 (ALT 250 FOR IP): Performed by: EMERGENCY MEDICINE

## 2023-08-23 PROCEDURE — 84145 PROCALCITONIN (PCT): CPT

## 2023-08-23 PROCEDURE — 80048 BASIC METABOLIC PNL TOTAL CA: CPT

## 2023-08-23 PROCEDURE — 80076 HEPATIC FUNCTION PANEL: CPT

## 2023-08-23 RX ORDER — SODIUM CHLORIDE, SODIUM LACTATE, POTASSIUM CHLORIDE, AND CALCIUM CHLORIDE .6; .31; .03; .02 G/100ML; G/100ML; G/100ML; G/100ML
1000 INJECTION, SOLUTION INTRAVENOUS ONCE
Status: COMPLETED | OUTPATIENT
Start: 2023-08-23 | End: 2023-08-23

## 2023-08-23 RX ORDER — 0.9 % SODIUM CHLORIDE 0.9 %
1000 INTRAVENOUS SOLUTION INTRAVENOUS ONCE
Status: COMPLETED | OUTPATIENT
Start: 2023-08-23 | End: 2023-08-23

## 2023-08-23 RX ADMIN — SODIUM CHLORIDE, POTASSIUM CHLORIDE, SODIUM LACTATE AND CALCIUM CHLORIDE 1000 ML: 600; 310; 30; 20 INJECTION, SOLUTION INTRAVENOUS at 15:47

## 2023-08-23 RX ADMIN — SODIUM CHLORIDE 1000 ML: 900 INJECTION, SOLUTION INTRAVENOUS at 01:45

## 2023-08-23 RX ADMIN — POTASSIUM BICARBONATE 40 MEQ: 782 TABLET, EFFERVESCENT ORAL at 01:45

## 2023-08-23 RX ADMIN — INSULIN HUMAN 8 UNITS: 100 INJECTION, SOLUTION PARENTERAL at 01:44

## 2023-08-23 ASSESSMENT — PAIN - FUNCTIONAL ASSESSMENT: PAIN_FUNCTIONAL_ASSESSMENT: NONE - DENIES PAIN

## 2023-08-23 NOTE — DISCHARGE INSTRUCTIONS
Please read all discharge paperwork and instructions. Please take all your previously prescribed medications as prescribed. As discussed with your son, we do recommend that you look into assisted living, or nursing home. If you develop any new, worsening, or concerning symptoms in the meantime, then please return to the emergency room. Otherwise please follow-up with your primary care provider.

## 2023-08-23 NOTE — ED TRIAGE NOTES
BIBA after wife reports being unable to wake pt up from sleep. Pt has hx of severe dementia and per EMS, wife has made numerous calls related to health concerns r/t pts dementia. Pt appears groggy, but is able to follow commands. Eyes opening spontaneously. VSS. Pt stating that \"feet hurt\" and head hurt\". Uncontrolled diabetic. BG in 400s. No signs of wounds or injury to feet or head.

## 2023-08-23 NOTE — ED PROVIDER NOTES
appears to be his normal presentation whenever he comes in like this. Did have a recent CT scan when he had a similar presentation several weeks ago. No new trauma, no evidence of trauma on the head. We will go ahead and get labs, to evaluate for any signs of infection, or metabolic derangement that could be causing encephalopathy. However if everything is normal, suspect likely sleep patient will be able to go home. Was able to speak with patient's son, who states that he thinks that his symptoms are related to food. Whenever he eats poorly, including potentially eating [employer] today, he will have these episodes where he becomes very somnolent, and altered. Amount and/or Complexity of Data Reviewed  Labs: ordered. Decision-making details documented in ED Course. Radiology: ordered. ECG/medicine tests: ordered. REASSESSMENT     ED Course as of 08/23/23 1823   Wed Aug 23, 2023   1513 EKG independently interpreted by me: sinus ladonna, rate of 58, LAD, prolonged LA - 1st degree AV block, no acute ST/T wave changes. [SH]   1539 Lactic Acid, Plasma(!!): 2.7  Will give liter bolus, per records review, patient does have a echo from a few years ago that shows normal EF. No evidence of heart failure on exam.  Should be able to tolerate 1 L LR. Afebrile, nontachycardic, hypertensive, with clear UA, and no WBC elevation. Low suspicion for sepsis at this time. [SH]   1541 Creatinine(!): 1.92  Mild creatinine elevation above patient's baseline compared with the past 2 weeks. May be a little fluid down. [SH]   1553 I personally evaluated the patient. Patient and wife are not sure why the patient is here. Wife does not recall why she called the ambulance. Report from EMS is that she felt he was more confused than usual.  Patient only complains of mild upper back pain but no chest pain, short of breath, headache, abdominal pain or vomiting.   On exam, lung sounds are clear to auscultation, regular

## 2023-08-23 NOTE — ED TRIAGE NOTES
Pt came via EMS stretcher from home, c/o altered mental status. Per EMS, pt's family called for concerns of cardiac arrest because pt was unresponsive after lunch. Upon EMS arrival, pt was reposnsive to verbal stimuli. B    Pt was seen in this ED last night and was diagnosed with Hyperglycemia and advanced dementia. Pt has a neuro appointment on Friday. Pt is awake, alert but disoriented x3. Wife at bedside.

## 2023-08-23 NOTE — ED PROVIDER NOTES
[facility] EMERGENCY DEPT  EMERGENCY DEPARTMENT ENCOUNTER      Pt Name: Addison Bashir  MRN: [id number]  [address] [facility] 1945  Date of evaluation: 8/22/2023  Provider: Torrey Ray MD    [address]       Chief Complaint   Patient presents with    Altered Mental Status         HISTORY OF PRESENT ILLNESS   (Location/Symptom, Timing/Onset, Context/Setting, Quality, Duration, Modifying Factors, Severity)  Note limiting factors. Addison Bashir is a 78 y.o. male who presents to the emergency department     59-year-old male presents emergency department with his wife because she says he passed out. EMS workers say she was having a hard time waking him up so she brought him to the ED. Staff knows him. It is reported he has Alzheimer's. Patient and wife do not know his medical history the extremely poor historians. Wife told me he does not have history of Alzheimer's dementia. Patient has suprapubic discomfort. No other complaints express    The history is provided by the patient and medical records. No  was used. Nursing Notes were reviewed. REVIEW OF SYSTEMS    (2-9 systems for level 4, 10 or more for level 5)     Review of Systems    Except as noted above the remainder of the review of systems was reviewed and negative.        PAST MEDICAL HISTORY     Past Medical History:   Diagnosis Date    Carotid artery stenosis     Bilateral Carotid Artery Stenosis    Dementia (HCC)     Diabetes (HCC)     Glaucoma     High cholesterol     Hypertension     S/P CABG (coronary artery bypass graft)     CAD S/P CABG    S/P TAVR (transcatheter aortic valve replacement)          SURGICAL HISTORY       Past Surgical History:   Procedure Laterality Date    SC UNLISTED PROCEDURE CARDIAC SURGERY      bypass         CURRENT MEDICATIONS       Previous Medications    APOAEQUORIN (PREVAGEN) 10 MG CAPS    as directed    ASPIRIN 81 MG EC TABLET    Take 81 mg by mouth daily    B COMPLEX VITAMINS

## 2023-08-24 ENCOUNTER — APPOINTMENT (OUTPATIENT)
Facility: HOSPITAL | Age: 78
End: 2023-08-24
Payer: MEDICARE

## 2023-08-24 ENCOUNTER — HOSPITAL ENCOUNTER (EMERGENCY)
Facility: HOSPITAL | Age: 78
Discharge: HOME | End: 2023-08-24
Attending: EMERGENCY MEDICINE
Payer: MEDICARE

## 2023-08-24 VITALS
HEIGHT: 65 IN | TEMPERATURE: 97.8 F | HEART RATE: 59 BPM | BODY MASS INDEX: 26.82 KG/M2 | WEIGHT: 161 LBS | OXYGEN SATURATION: 97 % | SYSTOLIC BLOOD PRESSURE: 132 MMHG | DIASTOLIC BLOOD PRESSURE: 59 MMHG | RESPIRATION RATE: 16 BRPM

## 2023-08-24 DIAGNOSIS — F03.90 DEMENTIA, UNSPECIFIED DEMENTIA SEVERITY, UNSPECIFIED DEMENTIA TYPE, UNSPECIFIED WHETHER BEHAVIORAL, PSYCHOTIC, OR MOOD DISTURBANCE OR ANXIETY (HCC): ICD-10-CM

## 2023-08-24 DIAGNOSIS — R40.4 TRANSIENT ALTERATION OF AWARENESS: Primary | ICD-10-CM

## 2023-08-24 LAB
ALBUMIN SERPL-MCNC: 3.7 G/DL (ref 3.4–5)
ALBUMIN/GLOB SERPL: 1.2 (ref 0.8–1.7)
ALP SERPL-CCNC: 81 U/L (ref 45–117)
ALT SERPL-CCNC: 33 U/L (ref 16–61)
ANION GAP SERPL CALC-SCNC: 4 MMOL/L (ref 3–18)
APPEARANCE UR: CLEAR
AST SERPL-CCNC: 24 U/L (ref 10–38)
BASOPHILS # BLD: 0 K/UL (ref 0–0.1)
BASOPHILS NFR BLD: 0 % (ref 0–2)
BILIRUB SERPL-MCNC: 0.6 MG/DL (ref 0.2–1)
BILIRUB UR QL: NEGATIVE
BUN SERPL-MCNC: 17 MG/DL (ref 7–18)
BUN/CREAT SERPL: 10 (ref 12–20)
CALCIUM SERPL-MCNC: 8.8 MG/DL (ref 8.5–10.1)
CHLORIDE SERPL-SCNC: 108 MMOL/L (ref 100–111)
CO2 SERPL-SCNC: 27 MMOL/L (ref 21–32)
COLOR UR: YELLOW
CREAT SERPL-MCNC: 1.67 MG/DL (ref 0.6–1.3)
DIFFERENTIAL METHOD BLD: ABNORMAL
EKG ATRIAL RATE: 53 BPM
EKG DIAGNOSIS: NORMAL
EKG P AXIS: 53 DEGREES
EKG P-R INTERVAL: 306 MS
EKG Q-T INTERVAL: 426 MS
EKG QRS DURATION: 92 MS
EKG QTC CALCULATION (BAZETT): 399 MS
EKG R AXIS: -36 DEGREES
EKG T AXIS: 19 DEGREES
EKG VENTRICULAR RATE: 53 BPM
EOSINOPHIL # BLD: 0.1 K/UL (ref 0–0.4)
EOSINOPHIL NFR BLD: 1 % (ref 0–5)
ERYTHROCYTE [DISTWIDTH] IN BLOOD BY AUTOMATED COUNT: 13.5 % (ref 11.6–14.5)
FLUAV RNA SPEC QL NAA+PROBE: NOT DETECTED
FLUBV RNA SPEC QL NAA+PROBE: NOT DETECTED
GLOBULIN SER CALC-MCNC: 3.1 G/DL (ref 2–4)
GLUCOSE SERPL-MCNC: 258 MG/DL (ref 74–99)
GLUCOSE UR STRIP.AUTO-MCNC: >1000 MG/DL
HCT VFR BLD AUTO: 37.6 % (ref 36–48)
HGB BLD-MCNC: 12.3 G/DL (ref 13–16)
HGB UR QL STRIP: NEGATIVE
IMM GRANULOCYTES # BLD AUTO: 0 K/UL (ref 0–0.04)
IMM GRANULOCYTES NFR BLD AUTO: 0 % (ref 0–0.5)
KETONES UR QL STRIP.AUTO: NEGATIVE MG/DL
LEUKOCYTE ESTERASE UR QL STRIP.AUTO: NEGATIVE
LYMPHOCYTES # BLD: 0.7 K/UL (ref 0.9–3.6)
LYMPHOCYTES NFR BLD: 14 % (ref 21–52)
MAGNESIUM SERPL-MCNC: 2.2 MG/DL (ref 1.6–2.6)
MCH RBC QN AUTO: 29.1 PG (ref 24–34)
MCHC RBC AUTO-ENTMCNC: 32.7 G/DL (ref 31–37)
MCV RBC AUTO: 89.1 FL (ref 78–100)
MONOCYTES # BLD: 0.3 K/UL (ref 0.05–1.2)
MONOCYTES NFR BLD: 6 % (ref 3–10)
NEUTS SEG # BLD: 3.9 K/UL (ref 1.8–8)
NEUTS SEG NFR BLD: 78 % (ref 40–73)
NITRITE UR QL STRIP.AUTO: NEGATIVE
NRBC # BLD: 0 K/UL (ref 0–0.01)
NRBC BLD-RTO: 0 PER 100 WBC
PH UR STRIP: 5.5 (ref 5–8)
PLATELET # BLD AUTO: 112 K/UL (ref 135–420)
PMV BLD AUTO: 10.6 FL (ref 9.2–11.8)
POTASSIUM SERPL-SCNC: 4.2 MMOL/L (ref 3.5–5.5)
PROT SERPL-MCNC: 6.8 G/DL (ref 6.4–8.2)
PROT UR STRIP-MCNC: NEGATIVE MG/DL
RBC # BLD AUTO: 4.22 M/UL (ref 4.35–5.65)
SARS-COV-2 RNA RESP QL NAA+PROBE: NOT DETECTED
SODIUM SERPL-SCNC: 139 MMOL/L (ref 136–145)
SP GR UR REFRACTOMETRY: 1.02 (ref 1–1.03)
TROPONIN I SERPL HS-MCNC: 12 NG/L (ref 0–78)
TROPONIN I SERPL HS-MCNC: 14 NG/L (ref 0–78)
UROBILINOGEN UR QL STRIP.AUTO: 0.2 EU/DL (ref 0.2–1)
WBC # BLD AUTO: 5 K/UL (ref 4.6–13.2)

## 2023-08-24 PROCEDURE — 99285 EMERGENCY DEPT VISIT HI MDM: CPT

## 2023-08-24 PROCEDURE — 81003 URINALYSIS AUTO W/O SCOPE: CPT

## 2023-08-24 PROCEDURE — 74176 CT ABD & PELVIS W/O CONTRAST: CPT

## 2023-08-24 PROCEDURE — 71045 X-RAY EXAM CHEST 1 VIEW: CPT

## 2023-08-24 PROCEDURE — 80053 COMPREHEN METABOLIC PANEL: CPT

## 2023-08-24 PROCEDURE — 85025 COMPLETE CBC W/AUTO DIFF WBC: CPT

## 2023-08-24 PROCEDURE — 70450 CT HEAD/BRAIN W/O DYE: CPT

## 2023-08-24 PROCEDURE — 87636 SARSCOV2 & INF A&B AMP PRB: CPT

## 2023-08-24 PROCEDURE — 84484 ASSAY OF TROPONIN QUANT: CPT

## 2023-08-24 PROCEDURE — 87086 URINE CULTURE/COLONY COUNT: CPT

## 2023-08-24 PROCEDURE — 93010 ELECTROCARDIOGRAM REPORT: CPT | Performed by: INTERNAL MEDICINE

## 2023-08-24 PROCEDURE — 93005 ELECTROCARDIOGRAM TRACING: CPT | Performed by: EMERGENCY MEDICINE

## 2023-08-24 PROCEDURE — 83735 ASSAY OF MAGNESIUM: CPT

## 2023-08-24 PROCEDURE — 87040 BLOOD CULTURE FOR BACTERIA: CPT

## 2023-08-24 ASSESSMENT — ENCOUNTER SYMPTOMS
RESPIRATORY NEGATIVE: 1
GASTROINTESTINAL NEGATIVE: 1
ALLERGIC/IMMUNOLOGIC NEGATIVE: 1
EYES NEGATIVE: 1

## 2023-08-24 ASSESSMENT — PAIN - FUNCTIONAL ASSESSMENT: PAIN_FUNCTIONAL_ASSESSMENT: NONE - DENIES PAIN

## 2023-08-24 NOTE — ED NOTES
Wife at nurse desk asking for D/C papers. D/C papers printed and reviewed with patient and wife. Patient getting dressed with minimal assistance needed from wife and staff. Patient D/C in stable condition with all belongings. Patient leaves with wife. No questions nor concern, NAD. Patient provided understanding of D/C instructions by Verbalized understanding and Repeated back.           Stuart Cowden, [address]  08/24/23 9423

## 2023-08-24 NOTE — ED TRIAGE NOTES
Pt presents on stretcher via EMS c/o being unresponsive for 45 mins. EMS report that pt was found by wife and has since still been unresponsive and only responding to painful stimuli. EMS report that [id number], VSS and that pt does have these episodes frequently. On arrival patient is speaking some but minimal and it is garbled and nonsensical. with sternal rubs Speech becomes clear and the patient was able to tell Nurse he could not verbalize his name and he did not know where he was. Pt did ask for his wife. Patient was placed on continuous monitor, resting on stretcher, side rails up x 2 and bed in lowest position, call bell within reach.

## 2023-08-24 NOTE — ED NOTES
Pt returned from CT scan, placed on continuous monitor. Pt resting on stretcher, side rails up x 2, wife at bed side, bed in lowest position and call bell within reach. No concerns at this time.       Matthew Spears RN  08/24/23 0303

## 2023-08-25 LAB
BACTERIA SPEC CULT: NORMAL
SERVICE CMNT-IMP: NORMAL

## 2023-08-27 ENCOUNTER — HOSPITAL ENCOUNTER (EMERGENCY)
Facility: HOSPITAL | Age: 78
Discharge: HOME | End: 2023-08-27
Attending: EMERGENCY MEDICINE
Payer: MEDICARE

## 2023-08-27 VITALS
RESPIRATION RATE: 16 BRPM | DIASTOLIC BLOOD PRESSURE: 59 MMHG | HEIGHT: 65 IN | HEART RATE: 62 BPM | WEIGHT: 161 LBS | SYSTOLIC BLOOD PRESSURE: 122 MMHG | OXYGEN SATURATION: 94 % | BODY MASS INDEX: 26.82 KG/M2 | TEMPERATURE: 97.9 F

## 2023-08-27 DIAGNOSIS — F03.90 DEMENTIA, UNSPECIFIED DEMENTIA SEVERITY, UNSPECIFIED DEMENTIA TYPE, UNSPECIFIED WHETHER BEHAVIORAL, PSYCHOTIC, OR MOOD DISTURBANCE OR ANXIETY (HCC): Primary | ICD-10-CM

## 2023-08-27 LAB
ALBUMIN SERPL-MCNC: 3.5 G/DL (ref 3.4–5)
ALBUMIN/GLOB SERPL: 1.1 (ref 0.8–1.7)
ALP SERPL-CCNC: 89 U/L (ref 45–117)
ALT SERPL-CCNC: 32 U/L (ref 16–61)
ANION GAP SERPL CALC-SCNC: 8 MMOL/L (ref 3–18)
AST SERPL-CCNC: 20 U/L (ref 10–38)
BACTERIA SPEC CULT: NORMAL
BASOPHILS # BLD: 0 K/UL (ref 0–0.1)
BASOPHILS NFR BLD: 1 % (ref 0–2)
BILIRUB SERPL-MCNC: 0.6 MG/DL (ref 0.2–1)
BUN SERPL-MCNC: 27 MG/DL (ref 7–18)
BUN/CREAT SERPL: 16 (ref 12–20)
CALCIUM SERPL-MCNC: 8.9 MG/DL (ref 8.5–10.1)
CHLORIDE SERPL-SCNC: 109 MMOL/L (ref 100–111)
CO2 SERPL-SCNC: 26 MMOL/L (ref 21–32)
CREAT SERPL-MCNC: 1.67 MG/DL (ref 0.6–1.3)
DIFFERENTIAL METHOD BLD: ABNORMAL
EOSINOPHIL # BLD: 0.2 K/UL (ref 0–0.4)
EOSINOPHIL NFR BLD: 4 % (ref 0–5)
ERYTHROCYTE [DISTWIDTH] IN BLOOD BY AUTOMATED COUNT: 13.5 % (ref 11.6–14.5)
GLOBULIN SER CALC-MCNC: 3.2 G/DL (ref 2–4)
GLUCOSE SERPL-MCNC: 141 MG/DL (ref 74–99)
HCT VFR BLD AUTO: 36.2 % (ref 36–48)
HGB BLD-MCNC: 12.1 G/DL (ref 13–16)
IMM GRANULOCYTES # BLD AUTO: 0 K/UL (ref 0–0.04)
IMM GRANULOCYTES NFR BLD AUTO: 0 % (ref 0–0.5)
LYMPHOCYTES # BLD: 1 K/UL (ref 0.9–3.6)
LYMPHOCYTES NFR BLD: 23 % (ref 21–52)
MCH RBC QN AUTO: 29.4 PG (ref 24–34)
MCHC RBC AUTO-ENTMCNC: 33.4 G/DL (ref 31–37)
MCV RBC AUTO: 88.1 FL (ref 78–100)
MONOCYTES # BLD: 0.5 K/UL (ref 0.05–1.2)
MONOCYTES NFR BLD: 12 % (ref 3–10)
NEUTS SEG # BLD: 2.7 K/UL (ref 1.8–8)
NEUTS SEG NFR BLD: 60 % (ref 40–73)
NRBC # BLD: 0 K/UL (ref 0–0.01)
NRBC BLD-RTO: 0 PER 100 WBC
PLATELET # BLD AUTO: 110 K/UL (ref 135–420)
PMV BLD AUTO: 10.3 FL (ref 9.2–11.8)
POTASSIUM SERPL-SCNC: 3.6 MMOL/L (ref 3.5–5.5)
PROT SERPL-MCNC: 6.7 G/DL (ref 6.4–8.2)
RBC # BLD AUTO: 4.11 M/UL (ref 4.35–5.65)
SERVICE CMNT-IMP: NORMAL
SODIUM SERPL-SCNC: 143 MMOL/L (ref 136–145)
WBC # BLD AUTO: 4.5 K/UL (ref 4.6–13.2)

## 2023-08-27 PROCEDURE — 93005 ELECTROCARDIOGRAM TRACING: CPT | Performed by: EMERGENCY MEDICINE

## 2023-08-27 PROCEDURE — 85025 COMPLETE CBC W/AUTO DIFF WBC: CPT

## 2023-08-27 PROCEDURE — 99284 EMERGENCY DEPT VISIT MOD MDM: CPT

## 2023-08-27 PROCEDURE — 80053 COMPREHEN METABOLIC PANEL: CPT

## 2023-08-27 ASSESSMENT — ENCOUNTER SYMPTOMS
CHEST TIGHTNESS: 0
EYES NEGATIVE: 1
ABDOMINAL PAIN: 0

## 2023-08-27 ASSESSMENT — PAIN - FUNCTIONAL ASSESSMENT: PAIN_FUNCTIONAL_ASSESSMENT: NONE - DENIES PAIN

## 2023-08-27 NOTE — ED NOTES
PT aoX4 at discharge. NAD noted. VSS. Pt d/c with son and wife. Pt provided with d/c paperwork.      Ayaan Crump RN  08/27/23 8962

## 2023-08-27 NOTE — ED PROVIDER NOTES
possible options       Disclaimer: Sections of this note are dictated using utilizing voice recognition software. Minor typographical errors may be present. If questions arise, please do not hesitate to contact me or call our department.          Raul Howard MD  08/27/23 0722

## 2023-08-27 NOTE — ED TRIAGE NOTES
Wife called EMS due to not being able to wake him up. Patient awakes to speech and follows commands. Patient has hx of dementia. Patient asking for ice water. Wife at bedside.

## 2023-08-28 LAB
EKG ATRIAL RATE: 64 BPM
EKG DIAGNOSIS: NORMAL
EKG P AXIS: 20 DEGREES
EKG P-R INTERVAL: 248 MS
EKG Q-T INTERVAL: 504 MS
EKG QRS DURATION: 140 MS
EKG QTC CALCULATION (BAZETT): 519 MS
EKG R AXIS: 143 DEGREES
EKG T AXIS: -27 DEGREES
EKG VENTRICULAR RATE: 64 BPM

## 2023-08-28 PROCEDURE — 93010 ELECTROCARDIOGRAM REPORT: CPT | Performed by: INTERNAL MEDICINE

## 2023-08-30 LAB
BACTERIA SPEC CULT: NORMAL
SERVICE CMNT-IMP: NORMAL

## 2023-09-01 ENCOUNTER — OFFICE VISIT (OUTPATIENT)
Age: 78
End: 2023-09-01
Payer: MEDICARE

## 2023-09-01 VITALS
RESPIRATION RATE: 18 BRPM | HEART RATE: 62 BPM | BODY MASS INDEX: 26.82 KG/M2 | DIASTOLIC BLOOD PRESSURE: 70 MMHG | WEIGHT: 161 LBS | SYSTOLIC BLOOD PRESSURE: 100 MMHG | OXYGEN SATURATION: 96 % | HEIGHT: 65 IN

## 2023-09-01 DIAGNOSIS — R55 SYNCOPE, UNSPECIFIED SYNCOPE TYPE: ICD-10-CM

## 2023-09-01 DIAGNOSIS — F03.C18 SEVERE DEMENTIA WITH OTHER BEHAVIORAL DISTURBANCE, UNSPECIFIED DEMENTIA TYPE (HCC): Primary | ICD-10-CM

## 2023-09-01 DIAGNOSIS — R41.3 MEMORY LOSS: ICD-10-CM

## 2023-09-01 DIAGNOSIS — I65.23 CAROTID STENOSIS, BILATERAL: ICD-10-CM

## 2023-09-01 PROCEDURE — 3078F DIAST BP <80 MM HG: CPT | Performed by: NURSE PRACTITIONER

## 2023-09-01 PROCEDURE — 1036F TOBACCO NON-USER: CPT | Performed by: NURSE PRACTITIONER

## 2023-09-01 PROCEDURE — G8427 DOCREV CUR MEDS BY ELIG CLIN: HCPCS | Performed by: NURSE PRACTITIONER

## 2023-09-01 PROCEDURE — G8417 CALC BMI ABV UP PARAM F/U: HCPCS | Performed by: NURSE PRACTITIONER

## 2023-09-01 PROCEDURE — 3074F SYST BP LT 130 MM HG: CPT | Performed by: NURSE PRACTITIONER

## 2023-09-01 PROCEDURE — 99214 OFFICE O/P EST MOD 30 MIN: CPT | Performed by: NURSE PRACTITIONER

## 2023-09-01 PROCEDURE — 1123F ACP DISCUSS/DSCN MKR DOCD: CPT | Performed by: NURSE PRACTITIONER

## 2023-09-01 PROCEDURE — 99213 OFFICE O/P EST LOW 20 MIN: CPT | Performed by: NURSE PRACTITIONER

## 2023-09-01 RX ORDER — QUETIAPINE FUMARATE 25 MG/1
12.5 TABLET, FILM COATED ORAL NIGHTLY
Qty: 30 TABLET | Refills: 5 | Status: SHIPPED | OUTPATIENT
Start: 2023-09-01

## 2023-09-01 RX ORDER — MEMANTINE HYDROCHLORIDE 5 MG/1
10 TABLET ORAL DAILY
Qty: 60 TABLET | Refills: 5 | Status: SHIPPED | OUTPATIENT
Start: 2023-09-01

## 2023-09-01 RX ORDER — GABAPENTIN 100 MG/1
CAPSULE ORAL
COMMUNITY
Start: 2023-08-21

## 2023-09-01 NOTE — PROGRESS NOTES
[address] [place] [address] Drive  [address] [facility]. [address] [place] [address]  Office:  [phone]  Fax: [phone]  Chief Complaint   Patient presents with    Dementia    Follow-up       HPI: Carleen Torrez presents in follow-up for dementia. He was last seen here on 3/29/2023 which was in initial evaluation. History as below. He was noted that he has had memory loss dating back 2 years ago and worsening faster over the past 6 months. He lives with his wife. He asks the same questions in a short span of time. Was not remembering where he lives or how long he lives there. His family noted decline. He does ADLs but family members help with IADLs. He was going to be moving in with one of his sons. Work-up was to be obtained including MRI and EEG. He had history of low vitamin B12 level in the past.  Vitamin B12, folate, and MMA to be checked. He was already on Aricept 10 mg daily and his son did not notice help from it. He was on Namenda 5 mg. This was to be increased to 10 mg once daily and remain at that dose for now due to renal impairment. Once daily dosing to help for adherence. His son helped supervise with medications in the morning. He is on SSRI. He had signs of parkinsonism on exam.  His son noted he had paranoia which was getting worse. May consider low-dose of Seroquel 12.5 mg.  Discussed the consideration for sleep evaluation. He was referred for home health PT and OT. Neuropsychological evaluation to be obtained. In the interim, he has had many ER visits for complaints including extremity pain, chest pain, unresponsiveness, altered mental status, body aches all over, generalized pain. At ER visit on 8/22/2023 he was found to be hyperglycemic and hypokalemic. EKG in the ER on 8/24/2023 showed sinus bradycardia with first-degree AV block with ventricular rate of 53. CT head reported no acute intracranial abnormalities.   The most recent ER visit was on

## 2023-09-01 NOTE — PATIENT INSTRUCTIONS
Patient instructions:  -stop donepezil/Aricept  -low dose quetiapine 12.5 mg nightly  -continue memantine/Namenda   -lab for vit B12 level  -neuropsychological evaluation: [employer]- [phone]  - [email or web address] - Help and Support - Caregiving

## 2023-09-12 ENCOUNTER — APPOINTMENT (OUTPATIENT)
Facility: HOSPITAL | Age: 78
End: 2023-09-12
Payer: MEDICARE

## 2023-09-12 ENCOUNTER — HOSPITAL ENCOUNTER (OUTPATIENT)
Facility: HOSPITAL | Age: 78
Setting detail: OBSERVATION
Discharge: LEFT BEFORE DISCHARGE | End: 2023-09-13
Attending: STUDENT IN AN ORGANIZED HEALTH CARE EDUCATION/TRAINING PROGRAM | Admitting: STUDENT IN AN ORGANIZED HEALTH CARE EDUCATION/TRAINING PROGRAM
Payer: MEDICARE

## 2023-09-12 DIAGNOSIS — R55 SYNCOPE, UNSPECIFIED SYNCOPE TYPE: ICD-10-CM

## 2023-09-12 DIAGNOSIS — R55 SYNCOPE AND COLLAPSE: Primary | ICD-10-CM

## 2023-09-12 LAB
ALBUMIN SERPL-MCNC: 4.1 G/DL (ref 3.4–5)
ALBUMIN/GLOB SERPL: 1.2 (ref 0.8–1.7)
ALP SERPL-CCNC: 114 U/L (ref 45–117)
ALT SERPL-CCNC: 35 U/L (ref 16–61)
ANION GAP SERPL CALC-SCNC: 5 MMOL/L (ref 3–18)
APPEARANCE UR: CLEAR
AST SERPL-CCNC: 23 U/L (ref 10–38)
BASOPHILS # BLD: 0 K/UL (ref 0–0.1)
BASOPHILS NFR BLD: 1 % (ref 0–2)
BILIRUB SERPL-MCNC: 0.6 MG/DL (ref 0.2–1)
BILIRUB UR QL: NEGATIVE
BUN SERPL-MCNC: 18 MG/DL (ref 7–18)
BUN/CREAT SERPL: 11 (ref 12–20)
CALCIUM SERPL-MCNC: 9.1 MG/DL (ref 8.5–10.1)
CHLORIDE SERPL-SCNC: 108 MMOL/L (ref 100–111)
CO2 SERPL-SCNC: 29 MMOL/L (ref 21–32)
COLOR UR: YELLOW
CREAT SERPL-MCNC: 1.68 MG/DL (ref 0.6–1.3)
DIFFERENTIAL METHOD BLD: ABNORMAL
EOSINOPHIL # BLD: 0.2 K/UL (ref 0–0.4)
EOSINOPHIL NFR BLD: 3 % (ref 0–5)
ERYTHROCYTE [DISTWIDTH] IN BLOOD BY AUTOMATED COUNT: 13.6 % (ref 11.6–14.5)
GLOBULIN SER CALC-MCNC: 3.4 G/DL (ref 2–4)
GLUCOSE SERPL-MCNC: 167 MG/DL (ref 74–99)
GLUCOSE UR STRIP.AUTO-MCNC: >1000 MG/DL
HCT VFR BLD AUTO: 40 % (ref 36–48)
HGB BLD-MCNC: 12.9 G/DL (ref 13–16)
HGB UR QL STRIP: NEGATIVE
IMM GRANULOCYTES # BLD AUTO: 0 K/UL (ref 0–0.04)
IMM GRANULOCYTES NFR BLD AUTO: 0 % (ref 0–0.5)
KETONES UR QL STRIP.AUTO: NEGATIVE MG/DL
LEUKOCYTE ESTERASE UR QL STRIP.AUTO: NEGATIVE
LYMPHOCYTES # BLD: 1.2 K/UL (ref 0.9–3.6)
LYMPHOCYTES NFR BLD: 23 % (ref 21–52)
MCH RBC QN AUTO: 29 PG (ref 24–34)
MCHC RBC AUTO-ENTMCNC: 32.3 G/DL (ref 31–37)
MCV RBC AUTO: 89.9 FL (ref 78–100)
MONOCYTES # BLD: 0.5 K/UL (ref 0.05–1.2)
MONOCYTES NFR BLD: 10 % (ref 3–10)
NEUTS SEG # BLD: 3.3 K/UL (ref 1.8–8)
NEUTS SEG NFR BLD: 64 % (ref 40–73)
NITRITE UR QL STRIP.AUTO: NEGATIVE
NRBC # BLD: 0 K/UL (ref 0–0.01)
NRBC BLD-RTO: 0 PER 100 WBC
PH UR STRIP: 5.5 (ref 5–8)
PLATELET # BLD AUTO: 126 K/UL (ref 135–420)
PMV BLD AUTO: 10.1 FL (ref 9.2–11.8)
POTASSIUM SERPL-SCNC: 3.4 MMOL/L (ref 3.5–5.5)
PROT SERPL-MCNC: 7.5 G/DL (ref 6.4–8.2)
PROT UR STRIP-MCNC: NEGATIVE MG/DL
RBC # BLD AUTO: 4.45 M/UL (ref 4.35–5.65)
SODIUM SERPL-SCNC: 142 MMOL/L (ref 136–145)
SP GR UR REFRACTOMETRY: 1.03 (ref 1–1.03)
TROPONIN I SERPL HS-MCNC: 38 NG/L (ref 0–78)
UROBILINOGEN UR QL STRIP.AUTO: 0.2 EU/DL (ref 0.2–1)
WBC # BLD AUTO: 5.2 K/UL (ref 4.6–13.2)

## 2023-09-12 PROCEDURE — 70450 CT HEAD/BRAIN W/O DYE: CPT

## 2023-09-12 PROCEDURE — 84484 ASSAY OF TROPONIN QUANT: CPT

## 2023-09-12 PROCEDURE — 99222 1ST HOSP IP/OBS MODERATE 55: CPT | Performed by: STUDENT IN AN ORGANIZED HEALTH CARE EDUCATION/TRAINING PROGRAM

## 2023-09-12 PROCEDURE — G0378 HOSPITAL OBSERVATION PER HR: HCPCS

## 2023-09-12 PROCEDURE — 85025 COMPLETE CBC W/AUTO DIFF WBC: CPT

## 2023-09-12 PROCEDURE — 81003 URINALYSIS AUTO W/O SCOPE: CPT

## 2023-09-12 PROCEDURE — 93005 ELECTROCARDIOGRAM TRACING: CPT | Performed by: STUDENT IN AN ORGANIZED HEALTH CARE EDUCATION/TRAINING PROGRAM

## 2023-09-12 PROCEDURE — 99285 EMERGENCY DEPT VISIT HI MDM: CPT

## 2023-09-12 PROCEDURE — 71045 X-RAY EXAM CHEST 1 VIEW: CPT

## 2023-09-12 PROCEDURE — 80053 COMPREHEN METABOLIC PANEL: CPT

## 2023-09-12 RX ORDER — MAGNESIUM SULFATE IN WATER 40 MG/ML
2000 INJECTION, SOLUTION INTRAVENOUS PRN
Status: DISCONTINUED | OUTPATIENT
Start: 2023-09-12 | End: 2023-09-13 | Stop reason: HOSPADM

## 2023-09-12 RX ORDER — SODIUM CHLORIDE 0.9 % (FLUSH) 0.9 %
5-40 SYRINGE (ML) INJECTION PRN
Status: DISCONTINUED | OUTPATIENT
Start: 2023-09-12 | End: 2023-09-13 | Stop reason: HOSPADM

## 2023-09-12 RX ORDER — POLYETHYLENE GLYCOL 3350 17 G/17G
17 POWDER, FOR SOLUTION ORAL DAILY PRN
Status: DISCONTINUED | OUTPATIENT
Start: 2023-09-12 | End: 2023-09-13 | Stop reason: HOSPADM

## 2023-09-12 RX ORDER — POTASSIUM CHLORIDE 7.45 MG/ML
10 INJECTION INTRAVENOUS PRN
Status: DISCONTINUED | OUTPATIENT
Start: 2023-09-12 | End: 2023-09-13 | Stop reason: HOSPADM

## 2023-09-12 RX ORDER — ENOXAPARIN SODIUM 100 MG/ML
40 INJECTION SUBCUTANEOUS DAILY
Status: DISCONTINUED | OUTPATIENT
Start: 2023-09-13 | End: 2023-09-13 | Stop reason: HOSPADM

## 2023-09-12 RX ORDER — POTASSIUM CHLORIDE 20 MEQ/1
40 TABLET, EXTENDED RELEASE ORAL PRN
Status: DISCONTINUED | OUTPATIENT
Start: 2023-09-12 | End: 2023-09-13 | Stop reason: HOSPADM

## 2023-09-12 RX ORDER — SODIUM CHLORIDE 0.9 % (FLUSH) 0.9 %
5-40 SYRINGE (ML) INJECTION EVERY 12 HOURS SCHEDULED
Status: DISCONTINUED | OUTPATIENT
Start: 2023-09-12 | End: 2023-09-13 | Stop reason: HOSPADM

## 2023-09-12 RX ORDER — ONDANSETRON 2 MG/ML
4 INJECTION INTRAMUSCULAR; INTRAVENOUS EVERY 6 HOURS PRN
Status: DISCONTINUED | OUTPATIENT
Start: 2023-09-12 | End: 2023-09-13 | Stop reason: HOSPADM

## 2023-09-12 RX ORDER — SODIUM CHLORIDE 9 MG/ML
INJECTION, SOLUTION INTRAVENOUS PRN
Status: DISCONTINUED | OUTPATIENT
Start: 2023-09-12 | End: 2023-09-13 | Stop reason: HOSPADM

## 2023-09-12 RX ORDER — ACETAMINOPHEN 650 MG/1
650 SUPPOSITORY RECTAL EVERY 6 HOURS PRN
Status: DISCONTINUED | OUTPATIENT
Start: 2023-09-12 | End: 2023-09-13 | Stop reason: HOSPADM

## 2023-09-12 RX ORDER — ACETAMINOPHEN 325 MG/1
650 TABLET ORAL EVERY 6 HOURS PRN
Status: DISCONTINUED | OUTPATIENT
Start: 2023-09-12 | End: 2023-09-13 | Stop reason: HOSPADM

## 2023-09-12 RX ORDER — ONDANSETRON 4 MG/1
4 TABLET, ORALLY DISINTEGRATING ORAL EVERY 8 HOURS PRN
Status: DISCONTINUED | OUTPATIENT
Start: 2023-09-12 | End: 2023-09-13 | Stop reason: HOSPADM

## 2023-09-12 ASSESSMENT — ENCOUNTER SYMPTOMS
ABDOMINAL PAIN: 0
ABDOMINAL DISTENTION: 0
EYE PAIN: 0
CHEST TIGHTNESS: 0
CONSTIPATION: 0
FACIAL SWELLING: 0
SHORTNESS OF BREATH: 0
DIARRHEA: 0
BLOOD IN STOOL: 0
BACK PAIN: 0

## 2023-09-12 ASSESSMENT — PAIN - FUNCTIONAL ASSESSMENT: PAIN_FUNCTIONAL_ASSESSMENT: ADULT NONVERBAL PAIN SCALE (NPVS)

## 2023-09-12 NOTE — ED TRIAGE NOTES
Per wife patient transferred to ED for \"passing out \" type of behavior, states was laying in bed and just \"passed out\" patient. Patient alert at baseline, history of dementia, arrived at baseline.  Moving all extremities equally and responding ng appropriately

## 2023-09-12 NOTE — ED PROVIDER NOTES
EMERGENCY DEPARTMENT HISTORY AND PHYSICAL EXAM    6:04 PM      Date: 9/12/2023  Patient Name: Bryan Damon    History of Presenting Illness     Chief Complaint   Patient presents with    Loss of Consciousness       History From: Patient  HPI  78-year-old male with history of severe dementia, CABG, aortic valve replacement, PVD, hypertension, hyperlipidemia, type 2 diabetes who presents with syncope. As per wife patient was on the bed and had a syncopal episode for about 2 minutes . Denies any changes in meds, recent travel, or any other precipitating factors. When assessing ROS he denies any nausea, vomiting, chest pain, shortness of breath, abdominal pain, change in urination, or any other changes. Nursing Notes were all reviewed and agreed with or any disagreements were addressed in the HPI. PCP: Roxann De Leon MD    No current facility-administered medications for this encounter.      Current Outpatient Medications   Medication Sig Dispense Refill    metFORMIN (GLUCOPHAGE) 500 MG tablet one tablet Orally twice a day for 90 days      gabapentin (NEURONTIN) 100 MG capsule take 1 capsule by mouth once daily for 30 DAYS      QUEtiapine (SEROQUEL) 25 MG tablet Take 0.5 tablets by mouth at bedtime 30 tablet 5    memantine (NAMENDA) 5 MG tablet Take 2 tablets by mouth daily 60 tablet 5    omeprazole (PRILOSEC) 20 MG delayed release capsule Take 2 capsules by mouth Daily 60 capsule 0    MAGNESIUM PO Take 1 tablet by mouth daily      Apoaequorin (PREVAGEN) 10 MG CAPS as directed      B Complex Vitamins (VITAMIN B COMPLEX 100 IJ) as directed      Multiple Vitamins-Minerals (MULTIVITAMIN ADULT EXTRA C PO) 1 tablet      rosuvastatin (CRESTOR) 10 MG tablet Take 1 tablet by mouth daily      donepezil (ARICEPT) 10 MG tablet Take 1 tablet by mouth nightly 30 tablet 5    Inulin (FIBER CHOICE PO) Take 1 tablet by mouth daily      aspirin 81 MG EC tablet Take 1 tablet by mouth daily      empagliflozin

## 2023-09-13 ENCOUNTER — APPOINTMENT (OUTPATIENT)
Facility: HOSPITAL | Age: 78
End: 2023-09-13
Attending: FAMILY MEDICINE
Payer: MEDICARE

## 2023-09-13 ENCOUNTER — APPOINTMENT (OUTPATIENT)
Facility: HOSPITAL | Age: 78
End: 2023-09-13
Attending: STUDENT IN AN ORGANIZED HEALTH CARE EDUCATION/TRAINING PROGRAM
Payer: MEDICARE

## 2023-09-13 VITALS
OXYGEN SATURATION: 94 % | DIASTOLIC BLOOD PRESSURE: 74 MMHG | WEIGHT: 145 LBS | HEART RATE: 52 BPM | RESPIRATION RATE: 16 BRPM | HEIGHT: 65 IN | BODY MASS INDEX: 24.16 KG/M2 | SYSTOLIC BLOOD PRESSURE: 134 MMHG | TEMPERATURE: 97.6 F

## 2023-09-13 PROBLEM — E11.9 TYPE 2 DIABETES MELLITUS, WITHOUT LONG-TERM CURRENT USE OF INSULIN (HCC): Status: ACTIVE | Noted: 2023-01-26

## 2023-09-13 PROBLEM — F03.90 DEMENTIA (HCC): Status: ACTIVE | Noted: 2023-01-26

## 2023-09-13 PROBLEM — Z95.1 S/P CABG (CORONARY ARTERY BYPASS GRAFT): Status: ACTIVE | Noted: 2023-01-01

## 2023-09-13 PROBLEM — I10 HYPERTENSION: Status: ACTIVE | Noted: 2023-01-26

## 2023-09-13 PROBLEM — Z95.2 S/P TAVR (TRANSCATHETER AORTIC VALVE REPLACEMENT): Status: ACTIVE | Noted: 2023-01-26

## 2023-09-13 PROBLEM — E78.5 HYPERLIPIDEMIA: Status: ACTIVE | Noted: 2023-01-26

## 2023-09-13 LAB
ANION GAP SERPL CALC-SCNC: 6 MMOL/L (ref 3–18)
BASOPHILS # BLD: 0 K/UL (ref 0–0.1)
BASOPHILS NFR BLD: 1 % (ref 0–2)
BUN SERPL-MCNC: 16 MG/DL (ref 7–18)
BUN/CREAT SERPL: 11 (ref 12–20)
CALCIUM SERPL-MCNC: 8.8 MG/DL (ref 8.5–10.1)
CHLORIDE SERPL-SCNC: 111 MMOL/L (ref 100–111)
CO2 SERPL-SCNC: 27 MMOL/L (ref 21–32)
CREAT SERPL-MCNC: 1.51 MG/DL (ref 0.6–1.3)
DIFFERENTIAL METHOD BLD: ABNORMAL
EKG ATRIAL RATE: 64 BPM
EKG DIAGNOSIS: NORMAL
EKG P AXIS: 58 DEGREES
EKG P-R INTERVAL: 276 MS
EKG Q-T INTERVAL: 444 MS
EKG QRS DURATION: 98 MS
EKG QTC CALCULATION (BAZETT): 458 MS
EKG R AXIS: -25 DEGREES
EKG T AXIS: 31 DEGREES
EKG VENTRICULAR RATE: 64 BPM
EOSINOPHIL # BLD: 0.2 K/UL (ref 0–0.4)
EOSINOPHIL NFR BLD: 4 % (ref 0–5)
ERYTHROCYTE [DISTWIDTH] IN BLOOD BY AUTOMATED COUNT: 13.6 % (ref 11.6–14.5)
GLUCOSE BLD STRIP.AUTO-MCNC: 105 MG/DL (ref 70–110)
GLUCOSE SERPL-MCNC: 102 MG/DL (ref 74–99)
HCT VFR BLD AUTO: 36.7 % (ref 36–48)
HGB BLD-MCNC: 11.8 G/DL (ref 13–16)
IMM GRANULOCYTES # BLD AUTO: 0 K/UL (ref 0–0.04)
IMM GRANULOCYTES NFR BLD AUTO: 0 % (ref 0–0.5)
LYMPHOCYTES # BLD: 1.3 K/UL (ref 0.9–3.6)
LYMPHOCYTES NFR BLD: 26 % (ref 21–52)
MCH RBC QN AUTO: 29.1 PG (ref 24–34)
MCHC RBC AUTO-ENTMCNC: 32.2 G/DL (ref 31–37)
MCV RBC AUTO: 90.6 FL (ref 78–100)
MONOCYTES # BLD: 0.6 K/UL (ref 0.05–1.2)
MONOCYTES NFR BLD: 12 % (ref 3–10)
NEUTS SEG # BLD: 3 K/UL (ref 1.8–8)
NEUTS SEG NFR BLD: 58 % (ref 40–73)
NRBC # BLD: 0 K/UL (ref 0–0.01)
NRBC BLD-RTO: 0 PER 100 WBC
PLATELET # BLD AUTO: 123 K/UL (ref 135–420)
PMV BLD AUTO: 10.5 FL (ref 9.2–11.8)
POTASSIUM SERPL-SCNC: 3.4 MMOL/L (ref 3.5–5.5)
RBC # BLD AUTO: 4.05 M/UL (ref 4.35–5.65)
SODIUM SERPL-SCNC: 144 MMOL/L (ref 136–145)
TROPONIN I SERPL HS-MCNC: 34 NG/L (ref 0–78)
TSH SERPL DL<=0.05 MIU/L-ACNC: 2.19 UIU/ML (ref 0.36–3.74)
VIT B12 SERPL-MCNC: 392 PG/ML (ref 211–911)
WBC # BLD AUTO: 5.2 K/UL (ref 4.6–13.2)

## 2023-09-13 PROCEDURE — 96372 THER/PROPH/DIAG INJ SC/IM: CPT

## 2023-09-13 PROCEDURE — G0378 HOSPITAL OBSERVATION PER HR: HCPCS

## 2023-09-13 PROCEDURE — 97535 SELF CARE MNGMENT TRAINING: CPT

## 2023-09-13 PROCEDURE — 2580000003 HC RX 258: Performed by: STUDENT IN AN ORGANIZED HEALTH CARE EDUCATION/TRAINING PROGRAM

## 2023-09-13 PROCEDURE — 6360000002 HC RX W HCPCS: Performed by: STUDENT IN AN ORGANIZED HEALTH CARE EDUCATION/TRAINING PROGRAM

## 2023-09-13 PROCEDURE — 80048 BASIC METABOLIC PNL TOTAL CA: CPT

## 2023-09-13 PROCEDURE — 36415 COLL VENOUS BLD VENIPUNCTURE: CPT

## 2023-09-13 PROCEDURE — 84484 ASSAY OF TROPONIN QUANT: CPT

## 2023-09-13 PROCEDURE — 94761 N-INVAS EAR/PLS OXIMETRY MLT: CPT

## 2023-09-13 PROCEDURE — 99223 1ST HOSP IP/OBS HIGH 75: CPT | Performed by: INTERNAL MEDICINE

## 2023-09-13 PROCEDURE — 97162 PT EVAL MOD COMPLEX 30 MIN: CPT

## 2023-09-13 PROCEDURE — 6370000000 HC RX 637 (ALT 250 FOR IP): Performed by: STUDENT IN AN ORGANIZED HEALTH CARE EDUCATION/TRAINING PROGRAM

## 2023-09-13 PROCEDURE — 82607 VITAMIN B-12: CPT

## 2023-09-13 PROCEDURE — 85025 COMPLETE CBC W/AUTO DIFF WBC: CPT

## 2023-09-13 PROCEDURE — 84443 ASSAY THYROID STIM HORMONE: CPT

## 2023-09-13 PROCEDURE — 97166 OT EVAL MOD COMPLEX 45 MIN: CPT

## 2023-09-13 PROCEDURE — 82962 GLUCOSE BLOOD TEST: CPT

## 2023-09-13 PROCEDURE — 97116 GAIT TRAINING THERAPY: CPT

## 2023-09-13 RX ORDER — QUETIAPINE FUMARATE 25 MG/1
12.5 TABLET, FILM COATED ORAL NIGHTLY
Status: DISCONTINUED | OUTPATIENT
Start: 2023-09-13 | End: 2023-09-13 | Stop reason: HOSPADM

## 2023-09-13 RX ORDER — ROSUVASTATIN CALCIUM 10 MG/1
10 TABLET, COATED ORAL DAILY
Status: DISCONTINUED | OUTPATIENT
Start: 2023-09-13 | End: 2023-09-13 | Stop reason: HOSPADM

## 2023-09-13 RX ORDER — ASPIRIN 81 MG/1
81 TABLET ORAL DAILY
Status: DISCONTINUED | OUTPATIENT
Start: 2023-09-13 | End: 2023-09-13 | Stop reason: HOSPADM

## 2023-09-13 RX ORDER — SODIUM CHLORIDE, SODIUM LACTATE, POTASSIUM CHLORIDE, CALCIUM CHLORIDE 600; 310; 30; 20 MG/100ML; MG/100ML; MG/100ML; MG/100ML
INJECTION, SOLUTION INTRAVENOUS CONTINUOUS
Status: DISPENSED | OUTPATIENT
Start: 2023-09-13 | End: 2023-09-13

## 2023-09-13 RX ORDER — GABAPENTIN 100 MG/1
1 CAPSULE ORAL
COMMUNITY
Start: 2023-08-21 | End: 2023-09-13

## 2023-09-13 RX ORDER — GABAPENTIN 100 MG/1
100 CAPSULE ORAL NIGHTLY
Status: DISCONTINUED | OUTPATIENT
Start: 2023-09-13 | End: 2023-09-13 | Stop reason: HOSPADM

## 2023-09-13 RX ORDER — MEMANTINE HYDROCHLORIDE 10 MG/1
10 TABLET ORAL DAILY
Status: DISCONTINUED | OUTPATIENT
Start: 2023-09-13 | End: 2023-09-13 | Stop reason: HOSPADM

## 2023-09-13 RX ORDER — PANTOPRAZOLE SODIUM 40 MG/1
40 TABLET, DELAYED RELEASE ORAL
Status: DISCONTINUED | OUTPATIENT
Start: 2023-09-13 | End: 2023-09-13 | Stop reason: HOSPADM

## 2023-09-13 RX ORDER — DONEPEZIL HYDROCHLORIDE 5 MG/1
10 TABLET, FILM COATED ORAL NIGHTLY
Status: DISCONTINUED | OUTPATIENT
Start: 2023-09-13 | End: 2023-09-13 | Stop reason: HOSPADM

## 2023-09-13 RX ORDER — ROSUVASTATIN CALCIUM 20 MG/1
20 TABLET, COATED ORAL DAILY
COMMUNITY
Start: 2023-08-24 | End: 2023-09-13

## 2023-09-13 RX ORDER — MEMANTINE HYDROCHLORIDE 5 MG/1
5 TABLET ORAL DAILY
COMMUNITY
End: 2023-09-13

## 2023-09-13 RX ADMIN — MEMANTINE HYDROCHLORIDE 10 MG: 10 TABLET ORAL at 09:00

## 2023-09-13 RX ADMIN — GABAPENTIN 100 MG: 100 CAPSULE ORAL at 01:51

## 2023-09-13 RX ADMIN — QUETIAPINE FUMARATE 12.5 MG: 25 TABLET ORAL at 01:52

## 2023-09-13 RX ADMIN — ASPIRIN 81 MG: 81 TABLET, COATED ORAL at 09:00

## 2023-09-13 RX ADMIN — PANTOPRAZOLE SODIUM 40 MG: 40 TABLET, DELAYED RELEASE ORAL at 06:03

## 2023-09-13 RX ADMIN — ROSUVASTATIN CALCIUM 10 MG: 10 TABLET, COATED ORAL at 09:00

## 2023-09-13 RX ADMIN — SODIUM CHLORIDE, POTASSIUM CHLORIDE, SODIUM LACTATE AND CALCIUM CHLORIDE: 600; 310; 30; 20 INJECTION, SOLUTION INTRAVENOUS at 05:56

## 2023-09-13 RX ADMIN — METOPROLOL TARTRATE 12.5 MG: 25 TABLET, FILM COATED ORAL at 09:00

## 2023-09-13 RX ADMIN — SODIUM CHLORIDE, PRESERVATIVE FREE 10 ML: 5 INJECTION INTRAVENOUS at 10:04

## 2023-09-13 RX ADMIN — SODIUM CHLORIDE, PRESERVATIVE FREE 10 ML: 5 INJECTION INTRAVENOUS at 01:50

## 2023-09-13 RX ADMIN — ENOXAPARIN SODIUM 40 MG: 100 INJECTION SUBCUTANEOUS at 09:01

## 2023-09-13 RX ADMIN — DONEPEZIL HYDROCHLORIDE 10 MG: 5 TABLET, FILM COATED ORAL at 01:52

## 2023-09-13 RX ADMIN — METOPROLOL TARTRATE 12.5 MG: 25 TABLET, FILM COATED ORAL at 01:51

## 2023-09-13 ASSESSMENT — PAIN SCALES - GENERAL
PAINLEVEL_OUTOF10: 0
PAINLEVEL_OUTOF10: 0

## 2023-09-13 NOTE — V[facility] REMOTE MONITORING
Notified by Siria Lester RN that patient is non complient with tele monitor. Initial strip was obtained and interpretation was recorded in Epic.    Sinus Tachy

## 2023-09-13 NOTE — PROGRESS NOTES
Behavior:  Orientation  Overall Orientation Status: Impaired  Orientation Level: Oriented to person;Disoriented to place; Disoriented to time;Disoriented to situation    BLE Strength:    Strength: Within functional limits    BLE Range Of Motion:   AROM: Within functional limits    Functional Mobility:  Bed Mobility:  Bed Mobility Training  Bed Mobility Training: Yes  Supine to Sit: Modified independent  Sit to Supine: Modified independent  Transfers:  Transfer Training  Transfer Training: Yes  Sit to Stand: Modified independent  Stand to Sit: Modified independent  Toilet Transfer: Modified independent  Balance:   Balance  Sitting: Intact  Standing: With support  Ambulation/Gait Training:  Gait  Overall Level of Assistance: Supervision  Distance (ft): 40 Feet  Assistive Device: Walker, rolling  Therapeutic Exercises/Neuromuscular Re-education:   Dynamic standing balance 5 minutes    Pain:  Pain level pre-treatment: 0/10   Pain level post-treatment: 0/10     Activity Tolerance:   Activity Tolerance: Patient tolerated evaluation without incident    After treatment:   []         Patient left in no apparent distress sitting up in chair  [x]         Patient left in no apparent distress in bed  [x]         Call bell left within reach  [x]         Nursing notified  []         Caregiver present  []         Bed alarm activated  []         Chair alarm activated  []         SCDs applied    COMMUNICATION/EDUCATION:   Patient Education  Education Given To: Patient  Education Provided: Role of Therapy;Plan of Care  Education Method: Verbal;Demonstration; Teach Back  Barriers to Learning: None  Education Outcome: Verbalized understanding;Demonstrated understanding;Continued education needed    Thank you for this referral.  Luis Hernandez, PT  Minutes: 21    Eval Complexity:  Medium complexity

## 2023-09-13 NOTE — CARE COORDINATION
Patient left AMA with his wife. Patient signed [address] paperwork per [facility] LUIS PIKE RN. [facility] LUIS PIKE RN called patient's son Charity Hence, and updated, and patient and his wife spoke with patient's son Charity Ayala. Per patient's son Charity Ayala, patient's wife car is in the parking lot, and patient and his wife do have a ride home. Mireya ALAS updated Dr. Baltazar Tapia.                  Autumn Huston RN  Case Management [phone]

## 2023-09-13 NOTE — ACP (ADVANCE CARE PLANNING)
Advance Care Planning     General Advance Care Planning (ACP) Conversation      Palliative Medicine    Advance Care Planning Conversation      The patient and/or family consented to a voluntary Advance Care Planning conversation. Individuals present for the conversation: Patient and his wife. Outcome of the conversation and documents completed:  Visited patient for new consult along with Palliative team member Dr. Brittani Croft. Patient resting quietly in bed, awake, alert, oriented to self. Unable to tell our team why he is in the hospital, his date of birth or his sons names, also stated he lives in [place]". Wife at bedside. Shared that they have been  for 60 years. Both patient and wife were unable to name PCP or cardiologist that patient sees. Patient is pleasantly confused at this time. Looks to his wife for answers to our questions. Patient and wife are not able to participate in health care conversations or make complex medical decisions. Pt does have an Advance Directive on file in EMR. This AMD names his wife as his primary medical decision maker and his son Joann Almanzar as the secondary decision maker. Call placed to son Joann Almanzar at [phone]. Son is aware of parents confusion, states his brother Chacha Enrique lives with their parents. Son did acknowledge the AMD that is on file. He understands that his mother is not able to make any decisions by herself. Son is willing to meet with our team to discuss goals of care. He is not available until Friday 9/15/23, meeting is scheduled for 11:00 am. Jenni Unger will also talk with his brother so he can attend our meeting as well. ACP documents you currently have include:  [x] Advance Directive or Living Will  [] Durable Do Not Resuscitate  [] Physician Orders for Scope of Treatment (POST)  [] Medical Power of   [] Other      Thank you for the Palliative Medicine consult and allowing us to participate in the care of Mr. Luis Salinas.   Will continue to monitor and provide support.     CODE STATUS: FULL CODE WITH FULL INTERVENTIONS    Radha Rouse RN  Palliative Medicine Inpatient RN  [facility]  Palliative COPE Line: [phone] ([phone])

## 2023-09-13 NOTE — PROGRESS NOTES
Patient left AMA. Paperwork signed and patient and family educated on the importance of staying and completing tests prior to being discharged. RN called son Bindu Vasquez to see if he would be able to persuade them into staying, but explained that legally he cannot make the decision to keep him in the hospital.     Son, Bindu Vasquez, confirmed that the wife did drive here and they do have a ride home. Nursing supervisor and Dr. Samantha King informed.

## 2023-09-13 NOTE — ED NOTES
Pt ambulated to bathroom, assist x1. Then transport came to take pt to room via wheelchair.       Georgina Bojorquez RN  09/13/23 4143

## 2023-09-13 NOTE — PROGRESS NOTES
Received verbal report from [facility] [address] [place] [address] in ED.    0130 - Patient and Patient's wife are poor historians; son dropped off his mother, he did not come up to the floor. Patient's wife sat close to patient the entire night; recliner is in the room but the patient refused to sit in the recliner.

## 2023-09-19 ENCOUNTER — HOSPITAL ENCOUNTER (EMERGENCY)
Facility: HOSPITAL | Age: 78
Discharge: HOME | End: 2023-09-19
Attending: EMERGENCY MEDICINE
Payer: MEDICARE

## 2023-09-19 VITALS
SYSTOLIC BLOOD PRESSURE: 146 MMHG | DIASTOLIC BLOOD PRESSURE: 67 MMHG | TEMPERATURE: 98 F | RESPIRATION RATE: 17 BRPM | OXYGEN SATURATION: 95 % | HEART RATE: 62 BPM

## 2023-09-19 DIAGNOSIS — F03.90 DEMENTIA, UNSPECIFIED DEMENTIA SEVERITY, UNSPECIFIED DEMENTIA TYPE, UNSPECIFIED WHETHER BEHAVIORAL, PSYCHOTIC, OR MOOD DISTURBANCE OR ANXIETY (HCC): Primary | ICD-10-CM

## 2023-09-19 PROCEDURE — 99283 EMERGENCY DEPT VISIT LOW MDM: CPT

## 2023-09-19 PROCEDURE — 93005 ELECTROCARDIOGRAM TRACING: CPT | Performed by: EMERGENCY MEDICINE

## 2023-09-19 ASSESSMENT — PAIN - FUNCTIONAL ASSESSMENT: PAIN_FUNCTIONAL_ASSESSMENT: NONE - DENIES PAIN

## 2023-09-19 NOTE — ED TRIAGE NOTES
Pt arrives via EMS who reported wife called concerned that he was having pain d/t pt touching chest. Pt denied any complaints upon EMS arrival. Hx severe dementia.

## 2023-09-19 NOTE — ED NOTES
Discharge instructions provided to patient and wife, verbalize understanding.  No additional concerns or questions       Charlotte Ferguson RN  09/19/23 4825

## 2023-09-20 LAB
EKG ATRIAL RATE: 61 BPM
EKG DIAGNOSIS: NORMAL
EKG P AXIS: 47 DEGREES
EKG P-R INTERVAL: 336 MS
EKG Q-T INTERVAL: 466 MS
EKG QRS DURATION: 86 MS
EKG QTC CALCULATION (BAZETT): 469 MS
EKG R AXIS: -38 DEGREES
EKG T AXIS: -6 DEGREES
EKG VENTRICULAR RATE: 61 BPM

## 2023-09-20 PROCEDURE — 93010 ELECTROCARDIOGRAM REPORT: CPT | Performed by: INTERNAL MEDICINE

## 2023-09-21 NOTE — ED PROVIDER NOTES
which resolved on its own. I do not think he will benefit from blood work or imaging. I observed patient walk out of the hospital without issues and get into family car. DDX ACS, PE, cervical trauma, worsening dementia. Amount and/or Complexity of Data Reviewed  ECG/medicine tests: ordered. REASSESSMENT            CONSULTS:  None    PROCEDURES:  Unless otherwise noted below, none     Procedures        FINAL IMPRESSION      1. Dementia, unspecified dementia severity, unspecified dementia type, unspecified whether behavioral, psychotic, or mood disturbance or anxiety [facility])          DISPOSITION/PLAN   DISPOSITION Decision To Discharge 09/19/2023 05:28:03 PM      PATIENT REFERRED TO:  Ariana Quiroz MD  [address] [place] [id number] [phone]    Call in 1 day        DISCHARGE MEDICATIONS:  Discharge Medication List as of 9/19/2023  6:17 PM        Controlled Substances Monitoring:          No data to display                (Please note that portions of this note were completed with a voice recognition program.  Efforts were made to edit the dictations but occasionally words are mis-transcribed. )    Laurence Cool MD (electronically signed)  Attending Emergency Physician          Laurence Cool MD  09/21/23 2861

## 2023-09-23 ENCOUNTER — HOSPITAL ENCOUNTER (EMERGENCY)
Facility: HOSPITAL | Age: 78
Discharge: HOME | End: 2023-09-24
Attending: EMERGENCY MEDICINE
Payer: MEDICARE

## 2023-09-23 VITALS
SYSTOLIC BLOOD PRESSURE: 151 MMHG | TEMPERATURE: 98.4 F | OXYGEN SATURATION: 95 % | RESPIRATION RATE: 18 BRPM | DIASTOLIC BLOOD PRESSURE: 77 MMHG | HEART RATE: 70 BPM

## 2023-09-23 DIAGNOSIS — R07.9 CHEST PAIN, UNSPECIFIED TYPE: Primary | ICD-10-CM

## 2023-09-23 LAB — TROPONIN I SERPL HS-MCNC: 18 NG/L (ref 0–78)

## 2023-09-23 PROCEDURE — 84484 ASSAY OF TROPONIN QUANT: CPT

## 2023-09-23 PROCEDURE — 4500000002 HC ER NO CHARGE

## 2023-09-23 PROCEDURE — 99284 EMERGENCY DEPT VISIT MOD MDM: CPT

## 2023-09-23 PROCEDURE — 93005 ELECTROCARDIOGRAM TRACING: CPT | Performed by: EMERGENCY MEDICINE

## 2023-09-24 LAB
EKG ATRIAL RATE: 67 BPM
EKG DIAGNOSIS: NORMAL
EKG P AXIS: 70 DEGREES
EKG P-R INTERVAL: 304 MS
EKG Q-T INTERVAL: 438 MS
EKG QRS DURATION: 78 MS
EKG QTC CALCULATION (BAZETT): 462 MS
EKG R AXIS: -31 DEGREES
EKG T AXIS: 4 DEGREES
EKG VENTRICULAR RATE: 67 BPM

## 2023-09-24 PROCEDURE — 93010 ELECTROCARDIOGRAM REPORT: CPT | Performed by: INTERNAL MEDICINE

## 2023-09-24 NOTE — ED PROVIDER NOTES
EMERGENCY DEPARTMENT HISTORY AND PHYSICAL EXAM    10:59 PM EDT seen at this time is in room 11        Date: 9/23/2023  Patient Name: August Link    History of Presenting Illness     Chief Complaint   Patient presents with    Chest Pain         History Provided By: patient    Additional History (Context): August Link is a 78 y.o. male presents with extensive dementia known to myself and emergency department staff frequent complaints. Listed chief complaint is chest pain today but he does not complain of any chest pain he cannot tell me why he is here. Will investigate further. [place] Estrada PCP: Yomaira Huynh MD    Chief Complaint:   Duration:    Timing:    Location:   Quality:   Severity:   Modifying Factors:   Associated Symptoms:       No current facility-administered medications for this encounter.      Current Outpatient Medications   Medication Sig Dispense Refill    metFORMIN (GLUCOPHAGE) 500 MG tablet one tablet Orally twice a day for 90 days      gabapentin (NEURONTIN) 100 MG capsule take 1 capsule by mouth once daily for 30 DAYS      QUEtiapine (SEROQUEL) 25 MG tablet Take 0.5 tablets by mouth at bedtime 30 tablet 5    memantine (NAMENDA) 5 MG tablet Take 2 tablets by mouth daily 60 tablet 5    omeprazole (PRILOSEC) 20 MG delayed release capsule Take 2 capsules by mouth Daily 60 capsule 0    MAGNESIUM PO Take 1 tablet by mouth daily      Apoaequorin (PREVAGEN) 10 MG CAPS as directed      B Complex Vitamins (VITAMIN B COMPLEX 100 IJ) as directed      Multiple Vitamins-Minerals (MULTIVITAMIN ADULT EXTRA C PO) 1 tablet      rosuvastatin (CRESTOR) 10 MG tablet Take 1 tablet by mouth daily      Inulin (FIBER CHOICE PO) Take 1 tablet by mouth daily      aspirin 81 MG EC tablet Take 1 tablet by mouth daily      empagliflozin (JARDIANCE) 25 MG tablet Take 1 tablet by mouth daily      glimepiride (AMARYL) 4 MG tablet Take 1 tablet by mouth      metoprolol tartrate (LOPRESSOR) 25 MG tablet Take 0.5

## 2023-09-26 ENCOUNTER — HOSPITAL ENCOUNTER (EMERGENCY)
Facility: HOSPITAL | Age: 78
Discharge: HOME | End: 2023-09-26
Attending: EMERGENCY MEDICINE
Payer: MEDICARE

## 2023-09-26 ENCOUNTER — HOSPITAL ENCOUNTER (EMERGENCY)
Facility: HOSPITAL | Age: 78
Discharge: HOME | End: 2023-09-27
Attending: EMERGENCY MEDICINE
Payer: MEDICARE

## 2023-09-26 VITALS
BODY MASS INDEX: 27.46 KG/M2 | RESPIRATION RATE: 17 BRPM | TEMPERATURE: 97.9 F | OXYGEN SATURATION: 98 % | DIASTOLIC BLOOD PRESSURE: 69 MMHG | HEART RATE: 60 BPM | SYSTOLIC BLOOD PRESSURE: 131 MMHG | WEIGHT: 165 LBS

## 2023-09-26 DIAGNOSIS — Z79.4 TYPE 2 DIABETES MELLITUS WITH HYPERGLYCEMIA, WITH LONG-TERM CURRENT USE OF INSULIN (HCC): Primary | ICD-10-CM

## 2023-09-26 DIAGNOSIS — F03.C18 SEVERE DEMENTIA WITH OTHER BEHAVIORAL DISTURBANCE, UNSPECIFIED DEMENTIA TYPE (HCC): Primary | ICD-10-CM

## 2023-09-26 DIAGNOSIS — E11.65 TYPE 2 DIABETES MELLITUS WITH HYPERGLYCEMIA, WITH LONG-TERM CURRENT USE OF INSULIN (HCC): Primary | ICD-10-CM

## 2023-09-26 LAB
ALBUMIN SERPL-MCNC: 3.4 G/DL (ref 3.4–5)
ALBUMIN/GLOB SERPL: 1 (ref 0.8–1.7)
ALP SERPL-CCNC: 148 U/L (ref 45–117)
ALT SERPL-CCNC: 31 U/L (ref 16–61)
ANION GAP SERPL CALC-SCNC: 7 MMOL/L (ref 3–18)
APPEARANCE UR: CLEAR
AST SERPL-CCNC: 20 U/L (ref 10–38)
BASOPHILS # BLD: 0 K/UL (ref 0–0.1)
BASOPHILS NFR BLD: 1 % (ref 0–2)
BILIRUB SERPL-MCNC: 0.4 MG/DL (ref 0.2–1)
BILIRUB UR QL: NEGATIVE
BUN SERPL-MCNC: 21 MG/DL (ref 7–18)
BUN/CREAT SERPL: 13 (ref 12–20)
CALCIUM SERPL-MCNC: 8.6 MG/DL (ref 8.5–10.1)
CHLORIDE SERPL-SCNC: 101 MMOL/L (ref 100–111)
CO2 SERPL-SCNC: 28 MMOL/L (ref 21–32)
COLOR UR: YELLOW
CREAT SERPL-MCNC: 1.68 MG/DL (ref 0.6–1.3)
DIFFERENTIAL METHOD BLD: ABNORMAL
EOSINOPHIL # BLD: 0.1 K/UL (ref 0–0.4)
EOSINOPHIL NFR BLD: 3 % (ref 0–5)
ERYTHROCYTE [DISTWIDTH] IN BLOOD BY AUTOMATED COUNT: 13.3 % (ref 11.6–14.5)
GLOBULIN SER CALC-MCNC: 3.3 G/DL (ref 2–4)
GLUCOSE BLD STRIP.AUTO-MCNC: 379 MG/DL (ref 70–110)
GLUCOSE BLD STRIP.AUTO-MCNC: 447 MG/DL (ref 70–110)
GLUCOSE BLD STRIP.AUTO-MCNC: 470 MG/DL (ref 70–110)
GLUCOSE SERPL-MCNC: 452 MG/DL (ref 74–99)
GLUCOSE UR STRIP.AUTO-MCNC: >1000 MG/DL
HCT VFR BLD AUTO: 35.6 % (ref 36–48)
HGB BLD-MCNC: 11.8 G/DL (ref 13–16)
HGB UR QL STRIP: NEGATIVE
IMM GRANULOCYTES # BLD AUTO: 0 K/UL (ref 0–0.04)
IMM GRANULOCYTES NFR BLD AUTO: 0 % (ref 0–0.5)
KETONES UR QL STRIP.AUTO: NEGATIVE MG/DL
LEUKOCYTE ESTERASE UR QL STRIP.AUTO: NEGATIVE
LYMPHOCYTES # BLD: 1.1 K/UL (ref 0.9–3.6)
LYMPHOCYTES NFR BLD: 22 % (ref 21–52)
MAGNESIUM SERPL-MCNC: 2.1 MG/DL (ref 1.6–2.6)
MCH RBC QN AUTO: 29.6 PG (ref 24–34)
MCHC RBC AUTO-ENTMCNC: 33.1 G/DL (ref 31–37)
MCV RBC AUTO: 89.4 FL (ref 78–100)
MONOCYTES # BLD: 0.5 K/UL (ref 0.05–1.2)
MONOCYTES NFR BLD: 10 % (ref 3–10)
NEUTS SEG # BLD: 3.2 K/UL (ref 1.8–8)
NEUTS SEG NFR BLD: 65 % (ref 40–73)
NITRITE UR QL STRIP.AUTO: NEGATIVE
NRBC # BLD: 0 K/UL (ref 0–0.01)
NRBC BLD-RTO: 0 PER 100 WBC
PH BLDV: 7.45 (ref 7.32–7.42)
PH UR STRIP: 6 (ref 5–8)
PHOSPHATE SERPL-MCNC: 3.1 MG/DL (ref 2.5–4.9)
PLATELET # BLD AUTO: 103 K/UL (ref 135–420)
PMV BLD AUTO: 10.6 FL (ref 9.2–11.8)
POTASSIUM SERPL-SCNC: 3.7 MMOL/L (ref 3.5–5.5)
PROT SERPL-MCNC: 6.7 G/DL (ref 6.4–8.2)
PROT UR STRIP-MCNC: NEGATIVE MG/DL
RBC # BLD AUTO: 3.98 M/UL (ref 4.35–5.65)
SODIUM SERPL-SCNC: 136 MMOL/L (ref 136–145)
SP GR UR REFRACTOMETRY: 1.03 (ref 1–1.03)
UROBILINOGEN UR QL STRIP.AUTO: 1 EU/DL (ref 0.2–1)
WBC # BLD AUTO: 4.9 K/UL (ref 4.6–13.2)

## 2023-09-26 PROCEDURE — 82962 GLUCOSE BLOOD TEST: CPT

## 2023-09-26 PROCEDURE — 82800 BLOOD PH: CPT

## 2023-09-26 PROCEDURE — 84100 ASSAY OF PHOSPHORUS: CPT

## 2023-09-26 PROCEDURE — 99282 EMERGENCY DEPT VISIT SF MDM: CPT

## 2023-09-26 PROCEDURE — 93005 ELECTROCARDIOGRAM TRACING: CPT | Performed by: EMERGENCY MEDICINE

## 2023-09-26 PROCEDURE — 2580000003 HC RX 258: Performed by: EMERGENCY MEDICINE

## 2023-09-26 PROCEDURE — 83735 ASSAY OF MAGNESIUM: CPT

## 2023-09-26 PROCEDURE — 81003 URINALYSIS AUTO W/O SCOPE: CPT

## 2023-09-26 PROCEDURE — 80053 COMPREHEN METABOLIC PANEL: CPT

## 2023-09-26 PROCEDURE — 85025 COMPLETE CBC W/AUTO DIFF WBC: CPT

## 2023-09-26 RX ORDER — 0.9 % SODIUM CHLORIDE 0.9 %
1000 INTRAVENOUS SOLUTION INTRAVENOUS ONCE
Status: COMPLETED | OUTPATIENT
Start: 2023-09-26 | End: 2023-09-26

## 2023-09-26 RX ADMIN — SODIUM CHLORIDE 1000 ML: 900 INJECTION, SOLUTION INTRAVENOUS at 21:59

## 2023-09-26 ASSESSMENT — ENCOUNTER SYMPTOMS
SHORTNESS OF BREATH: 0
ABDOMINAL PAIN: 0

## 2023-09-26 NOTE — ED NOTES
Patient's son states patient has hx of dementia and while at home, Em Pike screamed out in pain. My father always have pain in his legs so I don't know what's wrong with him\".       Jessika Torre RN  09/26/23 4004

## 2023-09-26 NOTE — ED TRIAGE NOTES
Patient A/O x 4, presented to the ED with complaint of generalized body aches x 3 days. Patient denies fever but states, 'I've been feeling sick\".

## 2023-09-27 VITALS
OXYGEN SATURATION: 97 % | RESPIRATION RATE: 17 BRPM | WEIGHT: 165 LBS | BODY MASS INDEX: 27.46 KG/M2 | DIASTOLIC BLOOD PRESSURE: 74 MMHG | HEART RATE: 66 BPM | TEMPERATURE: 98.1 F | SYSTOLIC BLOOD PRESSURE: 158 MMHG

## 2023-09-27 LAB
EKG ATRIAL RATE: 69 BPM
EKG DIAGNOSIS: NORMAL
EKG P AXIS: 70 DEGREES
EKG P-R INTERVAL: 282 MS
EKG Q-T INTERVAL: 450 MS
EKG QRS DURATION: 80 MS
EKG QTC CALCULATION (BAZETT): 482 MS
EKG R AXIS: -43 DEGREES
EKG T AXIS: 21 DEGREES
EKG VENTRICULAR RATE: 69 BPM

## 2023-09-27 PROCEDURE — 93010 ELECTROCARDIOGRAM REPORT: CPT | Performed by: INTERNAL MEDICINE

## 2023-09-27 NOTE — ED TRIAGE NOTES
Patient in ED via EMS. Per EMS patient called for chest pain that then moved to his head and then to his leg. EMS blood sugar of 470. EMS started a liter of fluids. Blood sugar in ED is 447. Patient has a history of dementia, diabetes, and a cardiac history.

## 2023-09-27 NOTE — ED PROVIDER NOTES
Emergency Physician Note  [facility] EMERGENCY DEPT  [address] [address]  Dept: [phone]  Loc: [phone]  Open Note Time:  11:19 PM EDT    Chief Complaint  Hyperglycemia       History of Present Illness  Carey Bal is a 78 y.o. male  has a past medical history of Carotid artery stenosis, Dementia ([address]), Diabetes ([address]), Glaucoma, High cholesterol, Hypertension, S/P CABG (coronary artery bypass graft), and S/P TAVR (transcatheter aortic valve replacement). who presents to the ED for elevated blood glucose and or syncope. Son is in the room at the time of the interview. He states that the patient's wife called EMS, it was reported by text to one of her sons that the patient was moaning and was not waking up. Patient at this time thinks he can remember passing out. He states that he is his own person and that he wants to get out of here. Patient is not very helpful during the interview. Son at the bedside states that he asserts that his father is taking his medications as prescribed. Review of Systems   Respiratory:  Negative for shortness of breath. Cardiovascular:  Negative for chest pain. Gastrointestinal:  Negative for abdominal pain. Neurological:  Positive for syncope. Negative for weakness. Unless otherwise stated in this report or unable to obtain because of the patient's clinical or mental status as evidenced by the medical record, this patient's positive and negative responses for review of systems, constitutional, psych, eyes, ENT, cardiovascular, respiratory, gastrointestinal, neurological, genitourinary, musculoskeletal, integument systems and systems related to the presenting problem are either stated in the preceding paragraph or were not pertinent or were negative for the symptoms and/or complaints related to the medical problem.     I have reviewed the following from the nursing documentation:      Prior to Admission

## 2023-09-27 NOTE — ED NOTES
Patient discharged at this time. Patient is ambulatory,  alert and oriented at discharge. Discharge instructions explained to patient and patient verbalized understanding.       Jyoti Burk  09/27/23 2076

## 2023-09-28 NOTE — TELEPHONE ENCOUNTER
Patients son called and states that they would like his referral for neuro psych to now be sent to Dr. Hemal Arrieta. Their office number is [phone]. His son callback number is [phone]. Please advise.

## 2023-10-08 ENCOUNTER — HOSPITAL ENCOUNTER (OUTPATIENT)
Facility: HOSPITAL | Age: 78
Discharge: HOME | End: 2023-10-11
Payer: MEDICARE

## 2023-10-08 DIAGNOSIS — F03.C18 SEVERE DEMENTIA WITH OTHER BEHAVIORAL DISTURBANCE, UNSPECIFIED DEMENTIA TYPE (HCC): ICD-10-CM

## 2023-10-08 DIAGNOSIS — I65.23 CAROTID STENOSIS, BILATERAL: ICD-10-CM

## 2023-10-08 DIAGNOSIS — R55 SYNCOPE, UNSPECIFIED SYNCOPE TYPE: ICD-10-CM

## 2023-10-08 LAB — CREAT UR-MCNC: 1.6 MG/DL (ref 0.6–1.3)

## 2023-10-08 PROCEDURE — 70544 MR ANGIOGRAPHY HEAD W/O DYE: CPT

## 2023-10-08 PROCEDURE — 70551 MRI BRAIN STEM W/O DYE: CPT

## 2023-10-08 PROCEDURE — 82565 ASSAY OF CREATININE: CPT

## 2023-10-14 ENCOUNTER — HOSPITAL ENCOUNTER (EMERGENCY)
Facility: HOSPITAL | Age: 78
Discharge: HOME | End: 2023-10-14
Attending: EMERGENCY MEDICINE
Payer: MEDICARE

## 2023-10-14 VITALS
OXYGEN SATURATION: 95 % | RESPIRATION RATE: 19 BRPM | HEART RATE: 67 BPM | TEMPERATURE: 97.9 F | DIASTOLIC BLOOD PRESSURE: 71 MMHG | SYSTOLIC BLOOD PRESSURE: 130 MMHG

## 2023-10-14 DIAGNOSIS — E08.65 DIABETES MELLITUS DUE TO UNDERLYING CONDITION WITH HYPERGLYCEMIA, UNSPECIFIED WHETHER LONG TERM INSULIN USE (HCC): Primary | ICD-10-CM

## 2023-10-14 LAB
ANION GAP SERPL CALC-SCNC: 4 MMOL/L (ref 3–18)
BASOPHILS # BLD: 0 K/UL (ref 0–0.1)
BASOPHILS NFR BLD: 1 % (ref 0–2)
BUN SERPL-MCNC: 17 MG/DL (ref 7–18)
BUN/CREAT SERPL: 13 (ref 12–20)
CALCIUM SERPL-MCNC: 7.2 MG/DL (ref 8.5–10.1)
CHLORIDE SERPL-SCNC: 110 MMOL/L (ref 100–111)
CO2 SERPL-SCNC: 26 MMOL/L (ref 21–32)
CREAT SERPL-MCNC: 1.32 MG/DL (ref 0.6–1.3)
DIFFERENTIAL METHOD BLD: ABNORMAL
EOSINOPHIL # BLD: 0.2 K/UL (ref 0–0.4)
EOSINOPHIL NFR BLD: 3 % (ref 0–5)
ERYTHROCYTE [DISTWIDTH] IN BLOOD BY AUTOMATED COUNT: 12.8 % (ref 11.6–14.5)
GLUCOSE BLD STRIP.AUTO-MCNC: 391 MG/DL (ref 70–110)
GLUCOSE SERPL-MCNC: 390 MG/DL (ref 74–99)
HCT VFR BLD AUTO: 33.7 % (ref 36–48)
HGB BLD-MCNC: 11.2 G/DL (ref 13–16)
IMM GRANULOCYTES # BLD AUTO: 0 K/UL (ref 0–0.04)
IMM GRANULOCYTES NFR BLD AUTO: 0 % (ref 0–0.5)
LYMPHOCYTES # BLD: 1.2 K/UL (ref 0.9–3.6)
LYMPHOCYTES NFR BLD: 25 % (ref 21–52)
MCH RBC QN AUTO: 29.6 PG (ref 24–34)
MCHC RBC AUTO-ENTMCNC: 33.2 G/DL (ref 31–37)
MCV RBC AUTO: 89.2 FL (ref 78–100)
MONOCYTES # BLD: 0.6 K/UL (ref 0.05–1.2)
MONOCYTES NFR BLD: 12 % (ref 3–10)
NEUTS SEG # BLD: 2.9 K/UL (ref 1.8–8)
NEUTS SEG NFR BLD: 59 % (ref 40–73)
NRBC # BLD: 0 K/UL (ref 0–0.01)
NRBC BLD-RTO: 0 PER 100 WBC
PH BLDV: 7.38 (ref 7.32–7.42)
PLATELET # BLD AUTO: 106 K/UL (ref 135–420)
PMV BLD AUTO: 10.6 FL (ref 9.2–11.8)
POTASSIUM SERPL-SCNC: 3.4 MMOL/L (ref 3.5–5.5)
RBC # BLD AUTO: 3.78 M/UL (ref 4.35–5.65)
SODIUM SERPL-SCNC: 140 MMOL/L (ref 136–145)
WBC # BLD AUTO: 4.9 K/UL (ref 4.6–13.2)

## 2023-10-14 PROCEDURE — 99283 EMERGENCY DEPT VISIT LOW MDM: CPT

## 2023-10-14 PROCEDURE — 82800 BLOOD PH: CPT

## 2023-10-14 PROCEDURE — 82962 GLUCOSE BLOOD TEST: CPT

## 2023-10-14 PROCEDURE — 80048 BASIC METABOLIC PNL TOTAL CA: CPT

## 2023-10-14 PROCEDURE — 85025 COMPLETE CBC W/AUTO DIFF WBC: CPT

## 2023-10-14 RX ORDER — 0.9 % SODIUM CHLORIDE 0.9 %
1000 INTRAVENOUS SOLUTION INTRAVENOUS ONCE
Status: DISCONTINUED | OUTPATIENT
Start: 2023-10-14 | End: 2023-10-15 | Stop reason: HOSPADM

## 2023-10-15 NOTE — ED PROVIDER NOTES
EMERGENCY DEPARTMENT HISTORY AND PHYSICAL EXAM    10:10 PM EDT seen at this time in room 8        Date: 10/14/2023  Patient Name: Stone Borjas    History of Presenting Illness     Chief Complaint   Patient presents with    Hyperglycemia         History Provided By: patient/EMS    Additional History (Context): Stone Borjas is a 78 y.o. male presents with patient well-known to myself frequent visits, he and his wife have rather severe dementia and with any complaint will call EMS and he does not remember his complaint when he arrives. From EMS, they were dispatched for cardiac arrest because the wife was unable to arouse him from sleep. When they arrived he was alert and mentally at his baseline. For me he has no acute complaint that needs to be addressed, just does not want to stay in the ER very long. EMS blood sugar was about 400. [facility]     PCP: Alanna Wayne MD    Chief Complaint:   Duration:    Timing:    Location:   Quality:   Severity:   Modifying Factors:   Associated Symptoms:       Current Facility-Administered Medications   Medication Dose Route Frequency Provider Last Rate Last Admin    sodium chloride 0.9 % bolus 1,000 mL  1,000 mL IntraVENous Once Lissy Deleon MD         Current Outpatient Medications   Medication Sig Dispense Refill    metFORMIN (GLUCOPHAGE) 500 MG tablet one tablet Orally twice a day for 90 days      gabapentin (NEURONTIN) 100 MG capsule take 1 capsule by mouth once daily for 30 DAYS      QUEtiapine (SEROQUEL) 25 MG tablet Take 0.5 tablets by mouth at bedtime 30 tablet 5    memantine (NAMENDA) 5 MG tablet Take 2 tablets by mouth daily 60 tablet 5    omeprazole (PRILOSEC) 20 MG delayed release capsule Take 2 capsules by mouth Daily 60 capsule 0    MAGNESIUM PO Take 1 tablet by mouth daily      Apoaequorin (PREVAGEN) 10 MG CAPS as directed      B Complex Vitamins (VITAMIN B COMPLEX 100 IJ) as directed      Multiple Vitamins-Minerals (MULTIVITAMIN ADULT

## 2023-10-15 NOTE — ED TRIAGE NOTES
Pt arrived via EMS. Wife called after she attempted to wake him and he wouldn't wake up. By the time medics arrived pt was awake. He is alert and oriented. BS is elevated due to not taking him meds today. IV started by EMS. 1L normal saline running.

## 2023-10-16 ENCOUNTER — HOSPITAL ENCOUNTER (OUTPATIENT)
Facility: HOSPITAL | Age: 78
Discharge: HOME | End: 2023-10-19
Payer: MEDICARE

## 2023-10-16 ENCOUNTER — HOSPITAL ENCOUNTER (EMERGENCY)
Facility: HOSPITAL | Age: 78
Discharge: HOME | DRG: 641 | End: 2023-10-16
Payer: MEDICARE

## 2023-10-16 VITALS
HEART RATE: 67 BPM | WEIGHT: 177.4 LBS | DIASTOLIC BLOOD PRESSURE: 68 MMHG | OXYGEN SATURATION: 95 % | RESPIRATION RATE: 14 BRPM | SYSTOLIC BLOOD PRESSURE: 162 MMHG | HEIGHT: 65 IN | TEMPERATURE: 98.2 F | BODY MASS INDEX: 29.56 KG/M2

## 2023-10-16 DIAGNOSIS — R10.9 ABDOMINAL PAIN, UNSPECIFIED ABDOMINAL LOCATION: Primary | ICD-10-CM

## 2023-10-16 LAB — GLUCOSE BLD STRIP.AUTO-MCNC: 231 MG/DL (ref 70–110)

## 2023-10-16 PROCEDURE — 70549 MR ANGIOGRAPH NECK W/O&W/DYE: CPT

## 2023-10-16 PROCEDURE — A9577 INJ MULTIHANCE: HCPCS | Performed by: NURSE PRACTITIONER

## 2023-10-16 PROCEDURE — 6360000004 HC RX CONTRAST MEDICATION: Performed by: NURSE PRACTITIONER

## 2023-10-16 PROCEDURE — 99283 EMERGENCY DEPT VISIT LOW MDM: CPT

## 2023-10-16 PROCEDURE — 82962 GLUCOSE BLOOD TEST: CPT

## 2023-10-16 RX ADMIN — GADOBENATE DIMEGLUMINE 20 ML: 529 INJECTION, SOLUTION INTRAVENOUS at 14:41

## 2023-10-17 ASSESSMENT — ENCOUNTER SYMPTOMS
DIARRHEA: 0
VOMITING: 0
NAUSEA: 0
ABDOMINAL PAIN: 1

## 2023-10-17 NOTE — ED TRIAGE NOTES
Patient comes in with no physical complaint. EMS stated that it seems as if the patient had abdominal pain upon their assessment. Patient is well known to this ER, has a history of dementia and appears to be at baseline.

## 2023-10-17 NOTE — DISCHARGE INSTRUCTIONS
Take medication as prescribed. Follow-up with your primary care physician within 2 days for reassessment. Bring the results from this visit with you for their review. Return to the ED immediately for any new, worsening, or persistent symptoms, including fever, vomiting, chest pain, shortness of breath, or any other medical concerns.

## 2023-10-17 NOTE — ED PROVIDER NOTES
EMERGENCY DEPARTMENT HISTORY AND PHYSICAL EXAM    4:22 AM      Date: 10/16/2023  Patient Name: Lucila Lucero    History of Presenting Illness     Chief Complaint   Patient presents with    Abdominal Pain         History Provided By: Patient and medical chart review. Additional History (Context): Lucila Lucero is a 78 y.o. male with   Past Medical History:   Diagnosis Date    Carotid artery stenosis     Bilateral Carotid Artery Stenosis    Dementia (HCC)     Diabetes (HCC)     Glaucoma     High cholesterol     Hypertension     S/P CABG (coronary artery bypass graft)     CAD S/P CABG    S/P TAVR (transcatheter aortic valve replacement)    who presents via EMS. States they were unsure of what he was presenting for but thinks it may be due to abdominal pain. Patient is well-known to this emergency department he has a history of dementia. Patient currently denies any abdominal pain. Denies any nausea vomiting diarrhea. Denies any chest pain or shortness of breath. Patient unsure why he was brought into the emergency department. Patient's son at the bedside states that he should have never been brought to the emergency department he has dementia. Patient denies any abdominal pain. Denies any chest pain or shortness of breath. States he does not know why he is here and he is ready to go home. Son and patient Requesting to be discharged. PCP: Yogesh Hickman MD    No current facility-administered medications for this encounter.      Current Outpatient Medications   Medication Sig Dispense Refill    metFORMIN (GLUCOPHAGE) 500 MG tablet one tablet Orally twice a day for 90 days      gabapentin (NEURONTIN) 100 MG capsule take 1 capsule by mouth once daily for 30 DAYS      QUEtiapine (SEROQUEL) 25 MG tablet Take 0.5 tablets by mouth at bedtime 30 tablet 5    memantine (NAMENDA) 5 MG tablet Take 2 tablets by mouth daily 60 tablet 5    omeprazole (PRILOSEC) 20 MG delayed release capsule

## 2023-10-17 NOTE — ED NOTES
Son given discharge instructions and verbalized understanding.      Blayne Cavazos., RN  10/16/23 9607

## 2023-10-17 NOTE — ED NOTES
Was approached by son who would like for the patient to be released. Son states that the patient has dementia and should have never been brought here.      Juan Garnica., RN  10/16/23 2709

## 2023-10-19 PROBLEM — I73.9 PVD (PERIPHERAL VASCULAR DISEASE) (HCC): Status: ACTIVE | Noted: 2023-01-26

## 2023-10-19 PROBLEM — F32.A DEPRESSION: Status: ACTIVE | Noted: 2023-01-01

## 2023-10-19 PROBLEM — N40.0 BPH (BENIGN PROSTATIC HYPERPLASIA): Status: ACTIVE | Noted: 2023-01-26

## 2023-10-19 PROBLEM — R55 SYNCOPE: Status: ACTIVE | Noted: 2020-10-11

## 2023-10-19 PROBLEM — R10.9 ABDOMINAL PAIN: Status: ACTIVE | Noted: 2023-01-01

## 2023-10-19 NOTE — ED TRIAGE NOTES
Pt comes in via EMS due to higher level of confusion than normal. Pt has alzheimer's and is only resposive to pain at this time. His wife called EMS but had no information due to having Alzheimers herself.  Pt appears warm, dry, calm, and not in distress at this time

## 2023-10-19 NOTE — ED PROVIDER NOTES
[facility] EMERGENCY DEPT  EMERGENCY DEPARTMENT ENCOUNTER      Pt Name: Sariah Mcmullen  MRN: [id number]  [address] [facility] 1945  Date of evaluation: 10/19/2023  Provider: Gaby Harris       Chief Complaint   Patient presents with    Altered Mental Status         HISTORY OF PRESENT ILLNESS   (Location/Symptom, Timing/Onset, Context/Setting, Quality, Duration, Modifying Factors, Severity)  Note limiting factors. Sariah Mcmullen is a 78 y.o. male who presents to the emergency department with apparent altered mental status. Patient was seen here in the emergency department 3 days ago and family asked to have patient discharged said he did not have abdominal pain. Patient says he is not feeling well. He is not able to express where he is having discomfort. Denies chest pain shortness of breath or injury. Wife who called EMS apparently also has Alzheimer's and history is unreliable. I spoke with son he will be coming with patient. This is the patient's son the POA. Son has paperwork asking that 2 attendings sign off saying that patient cannot give informed consent as patient apparently signed himself out AMA from the hospital last time. HPI    Nursing Notes were reviewed. REVIEW OF SYSTEMS    (2-9 systems for level 4, 10 or more for level 5)     Review of Systems   Constitutional: Negative. HENT: Negative. Eyes: Negative. Respiratory: Negative. Cardiovascular: Negative. Gastrointestinal:  Positive for abdominal pain. Endocrine: Negative. Genitourinary: Negative. Musculoskeletal: Negative. Skin: Negative. Allergic/Immunologic: Negative. Neurological: Negative. Psychiatric/Behavioral:  Positive for confusion. Except as noted above the remainder of the review of systems was reviewed and negative.        PAST MEDICAL HISTORY     Past Medical History:   Diagnosis Date    Carotid artery stenosis     Bilateral Carotid Artery Stenosis

## 2023-10-20 PROBLEM — E86.0 DEHYDRATION: Status: ACTIVE | Noted: 2023-10-20

## 2023-10-20 PROBLEM — R41.82 ALTERED MENTAL STATUS: Status: ACTIVE | Noted: 2023-10-20

## 2023-10-20 NOTE — CONSULTS
[address] [facility]  Consultation Note    Date of Service:  10/20/23    Historian(s): The patient himself, ER staff, hospitalist staff, nursing staff and medical records. Referral Source: ER staff, attending hospitalist.    Chief Complaint   The patient was brought to the attention of the emergency department after his wife called EMS due to his complaining of \"not feeling well. \"  At the time of the examination the patient was not able to describe exactly what was happening, with some concerns being raised regarding the patient's altered mental status. By chart review, the patient had apparently requested to be discharged from the hospital in the past, with his son who is the POA requesting the patient to have a capacity evaluation since in his opinion, his father is unable to give informed consent to participate in treatment. So the basis for the request for a capacity evaluation. History of Present Illness     Carey Swain is a 78 y.o. White (non-) male  with a history of of dementia who is referred for a psychiatric consultation for the purpose of a capacity evaluation. By chart review, the patient has consulted with the [facility] emergency department on multiple occasions, and at times he has required to be medically hospitalized. Reasons for consulting with the emergency room have varied, however on many occasions the reason for consultation has been rather benign. The common denominator has been the patient having an underlying dementing process, and so the reason for which the patient's son is expressing concern about his father's ability to make decisions, and to participate in his own medical care. Psychiatric Treatment History     Self-injurious behavior/risky thoughts or behaviors (past suicidal ideation/attempt):   Denies any prior history of thoughts of self-harm or suicidal actions.     Violence/Risk to others (past homicidal ideation/attempt):

## 2023-10-20 NOTE — ED NOTES
Pt son and POA in room and updated on POC. Pt requesting consult to sign papers saying pt is unable to make informed decisions for self. POA requesting to keep pt here and finish testing without allowing pt to sign AMA due to not being able to make informed decisions due to advanced alzheimer's.  Pt also requesting DNR to be filled out by appropriate MDs       Jose Crawford RN  10/19/23 [phone]
Pt's ultrasound postponed due to medication administration. Requested if procedure could be done bedside. US will contact the department shortly.       Jorge Gray  10/19/23 0584
Spoke with the hospitalist in regards to the patient refusing PO meds and requested IV instead. He stated he would order PRN HTN IV meds and I could hold other PO meds.      Michela Blevins, [facility]  10/19/23 9118
TRANSFER - OUT REPORT:    Verbal report given to Stefanie Landeros RN on Jackie Menjivar  being transferred to [facility] for routine progression of patient care       Report consisted of patient's Situation, Background, Assessment and   Recommendations(SBAR). Information from the following report(s) Nurse Handoff Report, Index, ED Encounter Summary, ED SBAR, and MAR was reviewed with the receiving nurse. Trenton Fall Assessment:    Presents to emergency department  because of falls (Syncope, seizure, or loss of consciousness): No  Age > 70: Yes  Altered Mental Status, Intoxication with alcohol or substance confusion (Disorientation, impaired judgment, poor safety awaremess, or inability to follow instructions): Yes  Impaired Mobility: Ambulates or transfers with assistive devices or assistance; Unable to ambulate or transer.: Yes  Nursing Judgement: Yes          Lines:   Peripheral IV 10/19/23 Distal;Left; Anterior Cephalic (Active)        Opportunity for questions and clarification was provided.       Patient transported with:  Missy Betts RN  10/20/23 1621
hypodensity at the hilum, on   nonspecific, possibly remote infarct. 2.  Cholelithiasis. 3.  Similar infrarenal aortic aneurysm up to 3.3 cm. 4.  Moderate aortic atherosclerosis with multifocal regions of stenosis. 5.  Asbestos related pleural disease. 6.  Emphysema and fibrosis. 7.  Bilateral fat-containing inguinal hernias. 8.  Osteopenia/osteoporosis. CT Head W/O Contrast   Final Result      No acute findings. Moderate generalized volume loss. XR CHEST PORTABLE   Final Result   No acute findings. Asbestos related pleural disease. US ABDOMEN LIMITED Specify organ?  GALLBLADDER    (Results Pending)   Vascular duplex carotid bilateral    (Results Pending)             Lino Lopez MD  10/19/23 1850       Lino Lopez MD  10/23/23 5790

## 2023-10-20 NOTE — SIGNIFICANT EVENT
Notified by ED RN of increased agitation in ED, requesting sedative medication. Requested repeat ECG for QTC reported 650msec. Limited by use of QT prolonging agents (ziprasidone, olanzapine, haldol) & not taking PO (refusing). Previously responded well to ativan.     Plan:  - Ativan 1-2mg IV Q4hr prn for severe agitation  - Recheck Qtc later today to determine if alternative agents suitable    Merle Miles MD  4:24 AM

## 2023-10-20 NOTE — H&P
History and Physical          Subjective     HPI: Maliha Cee is a 78 y.o. male with a PMHx of dementia, DMT2, hyperlipidemia, hypertension, CAD status post CABG, history of TAVR who presented to the ED via EMS. Patient is not able to provide history due to advanced dementia. Patients son Licha Aguirre) called and history obtained via phone. According to the son, 'same thing happened as it always does- he passed out, ambulance showed up and was taken to the ED.'  Per son, he also complains of stomach pain. Patient has made several hospital visits in the past and has left AMA. Son states he lets himself out from the hospital every time when he should have been let out. Son sounds very frustrated over the phone and states he wants the patient to have all appropriate tests while he is there and needs psych evaluation before discharge. He also states that he was told by someone patient needs an echo and has to come in via ambulance in order to get the test.   Currently upon my evaluation, patient resting in no apparent distress. Patient does seem mildly agitated. Screams and yells out. Unable to state if he is having any pain anywhere. BUE wrist restraints noted. In the ED, Temp 97.9, respiration 16, heart rate 72, 148/71, 94% room air. Creatinine 1.72, glucose 319. Troponin x2 negative. Lipase negative. CBC without abnormality, except chronic thrombocytopenia. Blood cultures x2 obtained. Urine culture obtained. UA negative. Chest x-ray with no acute findings. CT head negative. CT abdomen and pelvis with no significant acute findings. Right upper quadrant ultrasound pending. Psych consulted for capacity eval.  Received 1 mg Ativan, 2 mg morphine, 5 mg IM Zyprexa, 1 L normal saline bolus in the ED.     Code status discussed with son via phone- DNR/DNI (POST form signed by ED MD with son via phone)     PMHx:  Past Medical History:   Diagnosis Date    Carotid artery stenosis     Bilateral Carotid No
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
## 2023-11-01 PROBLEM — R33.8 ACUTE URINARY RETENTION: Status: ACTIVE | Noted: 2023-01-01

## 2023-11-01 PROBLEM — G30.9 ALZHEIMER'S DEMENTIA WITH BEHAVIORAL DISTURBANCE (HCC): Status: ACTIVE | Noted: 2023-01-01

## 2023-11-01 PROBLEM — R79.89 PRERENAL AZOTEMIA: Status: ACTIVE | Noted: 2023-01-01

## 2023-11-01 PROBLEM — F02.818 ALZHEIMER'S DEMENTIA WITH BEHAVIORAL DISTURBANCE (HCC): Status: ACTIVE | Noted: 2023-01-01

## 2023-11-01 NOTE — PROCEDURES
Pt's Name: Nighat Faith  MR#: [id number]  : 1945  DATE OF SERVICE: 10/30/2023     REFERRING PHYSICIAN:  Ruthie Randall MD     EEG NUMBER:  MV      CLINICAL: An 78years old male patient with dementia admitted for passing out spells. Reason for the EEG: Evaluation of transient alterations of awareness. Patient's MEDICATIONS: Gabapentin, Zyprexa    EEG technique: Standard 10-20 method of electrode placement with an EKG. No photic stimulation or hyperventilation used as an activating procedures. This is an awake and asleep EEG recording. Total duration was 26 minutes. EEG REPORT: The background consists of diffuse  theta range slow waves at a frequency of around 6 Hz; there are also intermittent delta range slow waves which are generalized. No obvious spikes or sharp wave discharges. No significant asymmetry. No abnormal discharges during sleep. IMPRESSION:  Abnormal  EEG, due to the presence of generalized slowing    Clinical Correlation: Generalized slowing could be from encephalopathy [toxic/metabolic] or from a diffuse neurodegenerative process. Emery Guerrero MD

## 2023-11-03 PROBLEM — R53.81 DEBILITY: Status: ACTIVE | Noted: 2023-01-01

## 2023-11-03 PROBLEM — Z71.89 GOALS OF CARE, COUNSELING/DISCUSSION: Status: ACTIVE | Noted: 2023-01-01

## 2023-11-03 NOTE — DISCHARGE SUMMARY
[facility]    Discharge Summary    Patient: Hortensia Calderón MRN: [id number]  CSN: [id number]    YOB: 1945  Age: 78 y.o. Sex: male    DOA: 10/19/2023 LOS:  LOS: 15 days   Discharge Date:          Discharge Diagnoses:   1. Dementia with behavioral issues  2 recurrent syncope and collapse  3 history of CAD with CABG  4 hypertension  5 diabetes mellitus type 2  6 Symptomatic BPH on Flomax      Discharge Condition: [place]    Discharge To: Home    Consults: Neurology , Psych ,   palliative     HPI: per Admitting MD   \" HPI: Hortensia Calderón is a 78 y.o. male with a PMHx of dementia, DMT2, hyperlipidemia, hypertension, CAD status post CABG, history of TAVR who presented to the ED via EMS. Patient is not able to provide history due to advanced dementia. Patients son Kassie Cope) called and history obtained via phone. According to the son, 'same thing happened as it always does- he passed out, ambulance showed up and was taken to the ED.'  Per son, he also complains of stomach pain. Patient has made several hospital visits in the past and has left AMA. Son states he lets himself out from the hospital every time when he should have been let out. Son sounds very frustrated over the phone and states he wants the patient to have all appropriate tests while he is there and needs psych evaluation before discharge. He also states that he was told by someone patient needs an echo and has to come in via ambulance in order to get the test.   Currently upon my evaluation, patient resting in no apparent distress. Patient does seem mildly agitated. Screams and yells out. Unable to state if he is having any pain anywhere. BUE wrist restraints noted. In the ED, Temp 97.9, respiration 16, heart rate 72, 148/71, 94% room air. Creatinine 1.72, glucose 319. Troponin x2 negative. Lipase negative. CBC without abnormality, except chronic thrombocytopenia.   Blood

## 2023-11-03 NOTE — CONSULTS
[address]: [phone] ([phone])  [facility]: [phone]   [address] [place] [address]: [phone]    Patient Name: Annia Starks  YOB: 1945    Date of Initial Consult: 11/3/2023   Reason for Consult: care decisions   Requesting Provider: Dr Carmela De Los Santos   Primary Care Physician: Tarah Armstrong MD      SUMMARY:   Annia Starks is a 78y.o. year old with a past history of dementia with behavioral issues recurrent syncope, PAD, CAD with history of CABG, hypertension, and diabetes type 2, BPH, C Kd 3, who was admitted on 10/19/2023 from home with a diagnosis of syncope. Current medical issues leading to Palliative Medicine involvement include: 64-year-old male who is well-known from past admissions to our team he was admitted for syncopal episode likely due to dehydration. Patient with very advanced dementia with variable oral intake complicating hospital stay due to dehydration. Palliative medicine is consulted for support and care decisions. David Tiwari PALLIATIVE DIAGNOSES:   Goals of care/advance care plan discussions  Dementia  Syncope  Debility       PLAN:   Goals of care/care plan discussions seen along with Ms Doran Mcardle, [place]. Patient resting when we arrived in the room he will readily alert to his name he is somewhat confused. He can tell me his wife is his wife however he is unable to name her. His speech is slightly slurred. He was not currently able to participate in a conversation with me. Son Indio Hodges was at bedside. We discussed with Indio Hodges findings by attending and patient's overall advanced dementia. Prior to hospitalization he was ambulatory with a walker however much less so now. Has a very poor oral intake including poor oral fluid intake resulting in dehydration resulting in falls and syncopal episodes. With his advanced dementia and doubt he will continue to have an oral intake enough to sustain himself robustly.   We shared this

## 2023-11-03 NOTE — ACP (ADVANCE CARE PLANNING)
not take to medications as ordered his behaviors are not well controlled. Palliative team explained the end stages of dementia and hospice criteria including ambulation, speech and nutrition. He is open to speaking with the hospice liaison to obtain information on services that may assist his father and family. He will update his Brother, Reed Bradford on our conversation and hospice consult. Palliative will follow as support is needed.      Bladimir ROMERON, RN, [facility]  Palliative Medicine Inpatient RN  Palliative [facility] Line: [phone]

## 2023-11-06 PROBLEM — Z66 DNR (DO NOT RESUSCITATE): Status: ACTIVE | Noted: 2023-01-01

## 2023-11-06 PROBLEM — Z51.5 PALLIATIVE CARE ENCOUNTER: Status: ACTIVE | Noted: 2023-01-01

## 2023-11-07 NOTE — DISCHARGE INSTRUCTIONS
DISCHARGE SUMMARY from Nurse    PATIENT INSTRUCTIONS:    After general anesthesia or intravenous sedation, for 24 hours or while taking prescription Narcotics:  Limit your activities  Do not drive and operate hazardous machinery  Do not make important personal or business decisions  Do  not drink alcoholic beverages  If you have not urinated within 8 hours after discharge, please contact your surgeon on call. Report the following to your surgeon:  Excessive pain, swelling, redness or odor of or around the surgical area  Temperature over 100.5  Nausea and vomiting lasting longer than 4 hours or if unable to take medications  Any signs of decreased circulation or nerve impairment to extremity: change in color, persistent  numbness, tingling, coldness or increase pain  Any questions    What to do at Home:  Recommended activity: activity as tolerated, with assistance    If you experience any of the following symptoms Discharging on Home Hospice, please follow up with Hospice Care. *  Please give a list of your current medications to your Primary Care Provider. *  Please update this list whenever your medications are discontinued, doses are      changed, or new medications (including over-the-counter products) are added. *  Please carry medication information at all times in case of emergency situations. These are general instructions for a healthy lifestyle:    No smoking/ No tobacco products/ Avoid exposure to second hand smoke  Surgeon General's Warning:  Quitting smoking now greatly reduces serious risk to your health.     Obesity, smoking, and sedentary lifestyle greatly increases your risk for illness    A healthy diet, regular physical exercise & weight monitoring are important for maintaining a healthy lifestyle    You may be retaining fluid if you have a history of heart failure or if you experience any of the following symptoms:  Weight gain of 3 pounds or more overnight or 5 pounds in a week,

## 2023-11-07 NOTE — CARE COORDINATION
Call made to [facility] transportation, spoke with Albert Gonzalez, next available  time is at 9:00 pm.   Call made to [employer] [phone], spoke with Bee Nguyen, will transport patient at 12:30 pm.
Hospice patient requiring stretcher transport. No Oxygen  or IV's Patient is going to the address listed on face sheet.  Writer informed CM specialist
Patient spoke with son Kishan Joy regarding updates: Kishan Joy says that he is aware of the hospice consult and his brother will meet with hospice on Monday. Writer informed Kishan Joy that if patient goes home on hospice he will possibly continue to need personal care. Writer encouraged kelsey to start the process (Writer provided family with a list) so that by Monday when patient is discharged personal care will already be in place.
SOCIAL WORK UPDATE      ANTICIPATED DISCHARGE DISPOSITION: LTC with Memory Care    DECISION MAKER: Bayron Santiago [phone]    UAI/LTSS: Completed and Approved. Screening ID# [id number]        UPDATE: Patient's UAI has been approved and signed by Attending. Family , Dariusz Pérez from [address] [place] [address],[address]. Sissy Schuler met with TELMA and explained that patient has an open APS case. Sissy Schuler stated that she has been working with the patient since before his hospitalization on trying to get him assistance in the home, but patient's wife would not let her arrange the assistance. Sissy Schuler stated that the patient's current presentation is completely different from his baseline. Patient was driving, no slurred speech and completed his ADLs prior to current hospitalization. Sissy Schuler requested medical records for patient, SW provided records to Sissy Schuler. Armen's contact info [phone] [email or web address]. TELMA contacted [address] [place] [address] Ave  to inquire if patient met admission criteria. TELMA was unable to reach anyone, a voicemail was left with pertinent information. TELMA sent LTC a referral via [address], but no response from LT. TELMA contacted [facility] [phone] to schedule a family meeting. Yamila Holland stated that he will be available after 6pm tomorrow, or all day Saturday or Next Tuesday. TELMA will contact Yamila Holland once tx team has a definite date and time to meet.           Jamel Mahmood, MSW  Case Management
SOCIAL WORK UPDATE      ANTICIPATED DISCHARGE DISPOSITION: LTC with Memory Care    DECISION MAKER: Cale Can [phone]    UAI/LTSS: Completed, pending Attending Approval.  Screening ID# [id number]        UPDATE: TELMA as well as RN SOCRATES met with Son/MPOA, Amanda Paulson in CM office to discuss patient. Vanessa Barrie voiced his frustrations with not being updated by patient's Attending for 9 days. Vanessa Sood stated that today was the first time he had spoke with an Attending about the patient and that he very much appreciated the medical update from the Attending. Vanessa Sood noted that there was an IMM placed in patient's room that was stuffed under things on the shelf that Vanessa Sood had never seen before. Vanessa Sood stated that no one ever spoke to him about the IMM and he was concerned that the people were talking tot he patient and getting him to sign things and patient is not oriented. Vanessa Sood stated that patient's Spouse has Dementia and he is now the POA for patient and Spouse. Vanessa Sood requested that he is contacted first about decisions for the patient. Vanessa Sood noted that nursing staff have been excellent and communicate with him about patient. Vanessa Sood noted that he had received the SNF/LTC list in his email that TELMA sent some time ago, but that after speaking with the Attending today, it was decided that it was more appropriate for the patient to discharge to a LTC with Memory Care. TELMA stated an understanding and provided Vanessa Sood with a list on LTC with Memory Care/Locked Units. Vanessa Sood stated that he will review list and discuss with family. TELMA and Vanessa Sood agreed that SW will f/u with Vanessa Sood no later than Wednesday or Thursday to discuss LTC choice. TELMA inquired if Vanessa Sood and Stephanie had started that process of LTC Medicaid for patient. Vanessa Sood stated no as he needed an update on the patient's medical status prior to the patient's  being able to start the Medicaid application.   Vanessa Sood stated that now he is going
SOCIAL WORK UPDATE      ANTICIPATED DISCHARGE DISPOSITION: SNF     DECISION MAKER: Jeffmary Dianaprecious [phone]    UAI/LTSS: To be completed        UPDATE: SW contacted patient's Son/Amanda MO Brian [phone] to discuss SNF choices for patient. SW unable to reach Dawson Deshpande, a voicemail was left with pertinent information.           Brenton Hamilton, MSW  Case Management
Son, Manjinder Velez approaches this RN CM while walking to the elevator. He asks \" What happened to Derick? \"  This RN CM informs him that  it's the weekend, she has two days off. He says \" No, I mean, why was another  put on my dads case? \" Ozzy Area him that we can not discuss Medical  details or PII in the whitaker or elevators. Did inform him that Ana Alfonso was assigned to the case, to the best of writers knowledge that Ana Alfonso has a longstanding repertoire with patient and family, it was decided amongst the team that Ana Alfonso would provide continuity of care. Manjinder Velez states, \"Well that's too bad because now things are going in a different direction. I really liked what Rachel was doing. \"     This RN CM acknowledges his concern and apologizes that he feels that way, though it is something beyond this Cms control. He voices understanding, CM boards elevator.
Summary of event:    Family Care conference completed with sons Joann Almanzar and Royal barnett along with Dr. Gina Gonzales, myself, charge nurse and primary nurse. Lack of medical necessity has been explain to family by Dr. Gina Gonzales. Joann Almanzar says he has been \"trying to look for facilities to place his dad in however because of work hours he has not had enough time to make an informed decision. Writer reminded Joann Almanzar that he had been given a list last week. Writer ask if he and the family looked over the list which he stated \"I'm doing the best I can. \"    Joann Almanzar was  given a list of locked units by Magi Wayne on 11/29/23 Writer ask if he and family made a decision on any of the facilities which he replied I tried to look them up on the medicaid site but not all of them are listed so I couldn't make a decision. Writer explain to family that the options for the  patient is to go home with family support, go home with family support and they could hire care givers or go to a locked unit that would be paid for by the family until medicaid was approved. Joannsangeeta Sethon had previously requested pricing for [facility], [facility] and rehab, and [facility] which writer provided. Writer also informed Joann  about the adult day center where the facility would pick the patient up and drop patient back home M-F 9-5 for $87 per day    Writer inform Joann Almanzar that if the family was unable to provide that hospital with the name of the chosen locked unit by   6pm tomorrow the patient would return home with family support.
[facility] confirmed that patient is ready to transport home today @ noon.      Writer will ensure transportation is set up
Writer spoke with KELLE cole regarding selection of SNF choice. Writer informed Queenie Victor that it was extremely important to have the selection to us ASAP in order to begin the transition process. Queenie Victor requested writer to email him a list to facilities to Queenie Victor. Fred@"[employer]. com which the writer has done. Queenie Victor says he will be here in the morning and will give us a list of choices.     Referrals sent to [facility] in [facility]
Writer spoke with Nadine Gatica from hospice who confirms that she will be meeting with family at [address] [facility] today. Nadine Gatica says that she will make sure all needed equipment is ordered and ready for patient to be received home by Tomorrow mid morning. CM will continue to follow as needed.
Writer spoke with patients son Rigo Rodas [phone] regarding discharge plan. Writer informed son that patient does not meet medical necessity to be in the hospital.     Writer also informed son that since patient is over resourced for LTC benefits the family would need to hire personal care to come into the home, or pay for LTC until funds were exhausted and patient could qualify for LTC benefits    Rigo Rodas states: By the time they pay for all of that my parents will be bankrupt. Writer sympathized with son and informed him that a decision will need to be made tonight for a discharge tomorrow no later than Friday. Rigo Rodas became very upset stating \"Its not a safe discharge, Writer informed Rigo Rodas that his brother and mother are currently living in patients house and that they can also call personal care agencies to assist(which a list has been provided) Writer also gave [place] information about the adult day center.
(TBD)    Financial    Payor: MEDICARE / Plan: MEDICARE PART A AND B / Product Type: *No Product type* /     Does insurance require precert for SNF: No    Potential assistance Purchasing Medications: (P) No  Meds-to-Beds request:        [facility] #[id number] Estefania Yates, [address] [place] [id number]  Phone: [phone] Fax: [phone]      Notes:    Factors facilitating achievement of predicted outcomes: Family support    Barriers to discharge: Confusion, Cognitive deficit, Impulsivity, Limited safety awareness, Limited insight into deficits, and Medical complications    Additional Case Management Notes: Patient is anticipated to discharge to SNF. Transport TBD. The Plan for Transition of Care is related to the following treatment goals of Dehydration [E86.0]  Abdominal pain [R10.9]  Gallstones [K80.20]  Generalized abdominal pain [R10.84]  Altered mental status, unspecified altered mental status type [R41.82]  Dementia, unspecified dementia severity, unspecified dementia type, unspecified whether behavioral, psychotic, or mood disturbance or anxiety ([address]) [L26.95]    IF APPLICABLE: The Patient and/or patient representative Carey and his family were provided with a choice of provider and agrees with the discharge plan. Freedom of choice list with basic dialogue that supports the patient's individualized plan of care/goals and shares the quality data associated with the providers was provided to: (P) Patient Representative   Patient Representative Name: (P) Son/Mark MO. The Patient and/or Patient Representative Agree with the Discharge Plan?  (P) Yes       10/22/23 1022   Service Assessment   Patient Orientation Unable to Assess   Cognition Dementia / Early Alzheimer's   History Provided By Child/Family  (MPOA/Son, Author Age)   Primary Caregiver Family   Support Systems Spouse/Significant Other;Children   Patient's

## 2023-11-08 NOTE — HOSPICE
visit:  hospice philosophy, IDT, hospice services  Plan for next visit:  review comfort meds, set up visit schedule with patient  Care coordination with Medical Director, SANDRINE, and TARA Arreguin regarding admission to hospice and all are in agreement with plan of care. DME- see chart list    SUPPLIES-  diapers, pull ups, chux, wash basin, shampoo body wash, barrier cream, wipes ordered through medline    MEDS-   Comfort meds in home. [facility] delivery 11/9 for Dulcolax suppositories, Haldol liquid, olanzapine disintegrating tablets. Reviewed meds with son Florian Baird multiple times during admission and wrote them down; family will need ongoing reinforcement of med education; frandy Santiago left early during admission visit. Pt has fentanyl patch on right chest; no orders for fentanyl, per Elvie Rae put that on in hospital,\" and Florian Baird does not want this removed \"until it's done tomorrow in case that's helping him,\" Florian Baird v/u that morphine PRN is for pain control per current orders.

## 2023-11-08 NOTE — HOSPICE
Georges in completed. Consents signed. Comfort meds in the home. Admission to be completed tomorrw morning at 0900. Left one pack of XL briefs, wipes, chux, body wash one basin and barrier cream in the home. Discussed medications and family will need further education upon admission as son/POA is quite overwhelmed. Patient resting quietly upon this nurse leaving and family encouraged to call 24 hour number if any needs arise throughout the night.   POA voiced understanding

## 2023-11-09 NOTE — HOSPICE
Patient/Caregiver/Family findings/issues during SN visit:  none identified    Medication refills ordered this visit: Fentanyl 12mcg patch ordered confirmation# [id number]    Medications reconciled and all medications are available in the home this visit. Education was provided regarding medications, medication interactions, and look alike medications. Response to teaching. Son's verbalized understanding. Medications are effective at this time. Supplies by type and quantity ordered this visit include: none needed    Consulted medical director/attending physician regarding: Escript sent to Erma Montalvo NP    Instructed patient/family/caregiver on 24-hour hospice availability and phone number. Plan for next visit:  Continue to monitor the effectiveness of haldol to medication regimen.

## 2023-11-11 NOTE — HOSPICE
Medication refills ordered this visit: No refills needed. Patient has alll comfort medications in the home. Medications reconciled and all medications are available in the home this visit. Education was provided regarding medications, medication interactions, and look alike medications. Education also provided regarding end-of-life symptoms, what to expect, and how to manage. Discussion with spouse and two sons regarding patient's imminence. Reviewed steps to take if patient passes. Patient brief changed by this nurse and patient repositioned to his left side to facilitate drainage of secretions. Oral swab supplied at this visit and this nurse provided demonstration on how to use to remove excess secretions from the from of the mouth. Family will begin giving 0.75 mL dose of morphine and lorazepam at next scheduled interval as current dose of 0.5 mL does not seem to be lasting for expected duration and increased work of breathing is noted. Response to teaching. Gopi Horn verbalized an understanding to all education. Supplies by type and quantity ordered this visit include: None    Consulted medical director/attending physician regarding: No consult needed    Instructed patient/family/caregiver on 24-hour hospice availability and phone number. Plan for next visit: Continue to reinforce end-of-life education and monitor imminence.

## 2023-11-11 NOTE — HOSPICE
Upon arrival, found pt. lying in be4d and unresponsie to voice. Was mouth breating, Occassioally crying out to cargiver, butn ot saying anything. Calmer with soothing toucch to his arm. Sp)2 835%, T-98.3, P-78,  R-20, 128/78. Son reports no eating ond only a few sips today. Noted accessory muscle use during breathing noted. Adminidter 02 at 4L/ minute, while demonstrating proper 02 concentrator use to son who observed an vrbalize understanding. Aministered hyoscyamine 0.125 mj sub-l, Morphine 0.5 ml. and lorazepam 0.5 ml. .Meds administered aat 0300 sublingually, Prefilled syringes for next administration. Re-took Sp02 with 98% on 4L/ min. Advised son to give another dose of lorazepam. morphinr, and hyoscyamine in two hours, and call emergency hospice # if needed.

## 2023-11-12 NOTE — HOSPICE
Reason for today's visit is to complete assessment. Pt lives in his own home with spouse and Betty Yin and son Florian Baird is there often. Home is neat and tidy, no safety concerns noted. Son are tearful during assessment but open to as much education that can be given. Son report that they are in the process of working with a  for [employer]. Florian Baird reports that he has MPOA already. Florian Baird states pt and spouse bring in $5,500/month but Joesph Calderon go out to eat 3 times a day and buy things unnecessarily. \" Florian Baird reports they cannot afford a private duty aide, but accepted the list provided by this MSW and Florian Baird states he will call around to get an idea of pricing. MSW educates that pt cannot be left alone, as pt's spouse has dementia and cannot care for pt. Pt is not eating, drinking and is bedbound, dependant of all ADL's. Florian Baird reports that he would like to talk to the  to see the best option for his parents and MSW educates on [place], son verbalizes understanding. Son states that pt's spouse is having difficulty with pt's decline. MSW educates sons on resources and caring for people with this disease and sons state they will look into the Alzheimer's [employer]. Son agrees for MSW to follow up monthly to assess emotional support, caregiving needs, and provide support verbalized. MSW provides active listening and support throughout visit.

## 2023-11-15 ENCOUNTER — HOME CARE VISIT (OUTPATIENT)
Age: 78
End: 2023-11-15
Payer: MEDICARE
